# Patient Record
Sex: FEMALE | Race: WHITE | NOT HISPANIC OR LATINO | Employment: FULL TIME | URBAN - METROPOLITAN AREA
[De-identification: names, ages, dates, MRNs, and addresses within clinical notes are randomized per-mention and may not be internally consistent; named-entity substitution may affect disease eponyms.]

---

## 2019-10-21 ENCOUNTER — HOSPITAL ENCOUNTER (INPATIENT)
Facility: HOSPITAL | Age: 59
LOS: 2 days | Discharge: HOME | DRG: 871 | End: 2019-10-24
Attending: EMERGENCY MEDICINE | Admitting: INTERNAL MEDICINE
Payer: COMMERCIAL

## 2019-10-21 ENCOUNTER — APPOINTMENT (EMERGENCY)
Dept: RADIOLOGY | Facility: HOSPITAL | Age: 59
DRG: 871 | End: 2019-10-21
Payer: COMMERCIAL

## 2019-10-21 DIAGNOSIS — J18.9 PNEUMONIA: ICD-10-CM

## 2019-10-21 DIAGNOSIS — J18.9 PNEUMONIA OF RIGHT LOWER LOBE DUE TO INFECTIOUS ORGANISM: ICD-10-CM

## 2019-10-21 DIAGNOSIS — A41.9 SEPSIS (HCC): ICD-10-CM

## 2019-10-21 DIAGNOSIS — R50.9 FEVER: Primary | ICD-10-CM

## 2019-10-21 LAB
ALBUMIN SERPL BCP-MCNC: 3.1 G/DL (ref 3.5–5)
ALP SERPL-CCNC: 90 U/L (ref 46–116)
ALT SERPL W P-5'-P-CCNC: 6 U/L (ref 12–78)
ANION GAP SERPL CALCULATED.3IONS-SCNC: 6 MMOL/L (ref 4–13)
APTT PPP: 25 SECONDS (ref 25–32)
AST SERPL W P-5'-P-CCNC: 14 U/L (ref 5–45)
BASOPHILS # BLD AUTO: 0.02 THOUSANDS/ΜL (ref 0–0.1)
BASOPHILS NFR BLD AUTO: 0 % (ref 0–1)
BILIRUB SERPL-MCNC: 0.4 MG/DL (ref 0.2–1)
BILIRUB UR QL STRIP: NEGATIVE
BUN SERPL-MCNC: 13 MG/DL (ref 5–25)
CALCIUM SERPL-MCNC: 8.5 MG/DL (ref 8.3–10.1)
CHLORIDE SERPL-SCNC: 105 MMOL/L (ref 100–108)
CLARITY UR: CLEAR
CO2 SERPL-SCNC: 30 MMOL/L (ref 21–32)
COLOR UR: YELLOW
CREAT SERPL-MCNC: 0.81 MG/DL (ref 0.6–1.3)
EOSINOPHIL # BLD AUTO: 0.01 THOUSAND/ΜL (ref 0–0.61)
EOSINOPHIL NFR BLD AUTO: 0 % (ref 0–6)
ERYTHROCYTE [DISTWIDTH] IN BLOOD BY AUTOMATED COUNT: 12.5 % (ref 11.6–15.1)
GFR SERPL CREATININE-BSD FRML MDRD: 79 ML/MIN/1.73SQ M
GLUCOSE SERPL-MCNC: 101 MG/DL (ref 65–140)
GLUCOSE UR STRIP-MCNC: NEGATIVE MG/DL
HCT VFR BLD AUTO: 45.4 % (ref 34.8–46.1)
HGB BLD-MCNC: 14.3 G/DL (ref 11.5–15.4)
HGB UR QL STRIP.AUTO: NEGATIVE
IMM GRANULOCYTES # BLD AUTO: 0.03 THOUSAND/UL (ref 0–0.2)
IMM GRANULOCYTES NFR BLD AUTO: 0 % (ref 0–2)
INR PPP: 1.01 (ref 0.91–1.09)
KETONES UR STRIP-MCNC: NEGATIVE MG/DL
LACTATE SERPL-SCNC: 1.5 MMOL/L (ref 0.5–2)
LEUKOCYTE ESTERASE UR QL STRIP: NEGATIVE
LYMPHOCYTES # BLD AUTO: 0.45 THOUSANDS/ΜL (ref 0.6–4.47)
LYMPHOCYTES NFR BLD AUTO: 6 % (ref 14–44)
MCH RBC QN AUTO: 28.5 PG (ref 26.8–34.3)
MCHC RBC AUTO-ENTMCNC: 31.5 G/DL (ref 31.4–37.4)
MCV RBC AUTO: 91 FL (ref 82–98)
MONOCYTES # BLD AUTO: 0.25 THOUSAND/ΜL (ref 0.17–1.22)
MONOCYTES NFR BLD AUTO: 3 % (ref 4–12)
NEUTROPHILS # BLD AUTO: 6.83 THOUSANDS/ΜL (ref 1.85–7.62)
NEUTS SEG NFR BLD AUTO: 91 % (ref 43–75)
NITRITE UR QL STRIP: NEGATIVE
NRBC BLD AUTO-RTO: 0 /100 WBCS
PH UR STRIP.AUTO: 7.5 [PH]
PLATELET # BLD AUTO: 199 THOUSANDS/UL (ref 149–390)
PMV BLD AUTO: 9 FL (ref 8.9–12.7)
POTASSIUM SERPL-SCNC: 3.3 MMOL/L (ref 3.5–5.3)
PROT SERPL-MCNC: 6.3 G/DL (ref 6.4–8.2)
PROT UR STRIP-MCNC: NEGATIVE MG/DL
PROTHROMBIN TIME: 10.8 SECONDS (ref 9.8–12)
RBC # BLD AUTO: 5.01 MILLION/UL (ref 3.81–5.12)
SODIUM SERPL-SCNC: 141 MMOL/L (ref 136–145)
SP GR UR STRIP.AUTO: 1.01 (ref 1–1.03)
UROBILINOGEN UR QL STRIP.AUTO: 0.2 E.U./DL
WBC # BLD AUTO: 7.59 THOUSAND/UL (ref 4.31–10.16)

## 2019-10-21 PROCEDURE — 81003 URINALYSIS AUTO W/O SCOPE: CPT | Performed by: EMERGENCY MEDICINE

## 2019-10-21 PROCEDURE — 80053 COMPREHEN METABOLIC PANEL: CPT | Performed by: EMERGENCY MEDICINE

## 2019-10-21 PROCEDURE — 84145 PROCALCITONIN (PCT): CPT | Performed by: EMERGENCY MEDICINE

## 2019-10-21 PROCEDURE — 87631 RESP VIRUS 3-5 TARGETS: CPT | Performed by: EMERGENCY MEDICINE

## 2019-10-21 PROCEDURE — 96365 THER/PROPH/DIAG IV INF INIT: CPT

## 2019-10-21 PROCEDURE — 85730 THROMBOPLASTIN TIME PARTIAL: CPT | Performed by: EMERGENCY MEDICINE

## 2019-10-21 PROCEDURE — 83605 ASSAY OF LACTIC ACID: CPT | Performed by: EMERGENCY MEDICINE

## 2019-10-21 PROCEDURE — 36415 COLL VENOUS BLD VENIPUNCTURE: CPT | Performed by: EMERGENCY MEDICINE

## 2019-10-21 PROCEDURE — 99285 EMERGENCY DEPT VISIT HI MDM: CPT

## 2019-10-21 PROCEDURE — 74177 CT ABD & PELVIS W/CONTRAST: CPT

## 2019-10-21 PROCEDURE — 96375 TX/PRO/DX INJ NEW DRUG ADDON: CPT

## 2019-10-21 PROCEDURE — 71045 X-RAY EXAM CHEST 1 VIEW: CPT

## 2019-10-21 PROCEDURE — 85025 COMPLETE CBC W/AUTO DIFF WBC: CPT | Performed by: EMERGENCY MEDICINE

## 2019-10-21 PROCEDURE — 85610 PROTHROMBIN TIME: CPT | Performed by: EMERGENCY MEDICINE

## 2019-10-21 PROCEDURE — 71260 CT THORAX DX C+: CPT

## 2019-10-21 PROCEDURE — 87040 BLOOD CULTURE FOR BACTERIA: CPT | Performed by: EMERGENCY MEDICINE

## 2019-10-21 PROCEDURE — 96361 HYDRATE IV INFUSION ADD-ON: CPT

## 2019-10-21 RX ORDER — CYCLOBENZAPRINE HCL 10 MG
10 TABLET ORAL 3 TIMES DAILY PRN
COMMUNITY

## 2019-10-21 RX ORDER — ONDANSETRON 2 MG/ML
4 INJECTION INTRAMUSCULAR; INTRAVENOUS ONCE
Status: COMPLETED | OUTPATIENT
Start: 2019-10-21 | End: 2019-10-21

## 2019-10-21 RX ORDER — OXYCODONE HYDROCHLORIDE AND ACETAMINOPHEN 5; 325 MG/1; MG/1
1 TABLET ORAL EVERY 4 HOURS PRN
COMMUNITY

## 2019-10-21 RX ORDER — DIAZEPAM 2 MG/1
2 TABLET ORAL EVERY 6 HOURS PRN
COMMUNITY

## 2019-10-21 RX ORDER — ACETAMINOPHEN 325 MG/1
650 TABLET ORAL ONCE
Status: COMPLETED | OUTPATIENT
Start: 2019-10-21 | End: 2019-10-21

## 2019-10-21 RX ADMIN — PIPERACILLIN SODIUM,TAZOBACTAM SODIUM 3.38 G: 3; .375 INJECTION, POWDER, FOR SOLUTION INTRAVENOUS at 23:20

## 2019-10-21 RX ADMIN — IOHEXOL 100 ML: 350 INJECTION, SOLUTION INTRAVENOUS at 23:01

## 2019-10-21 RX ADMIN — ACETAMINOPHEN 650 MG: 325 TABLET, FILM COATED ORAL at 22:06

## 2019-10-21 RX ADMIN — SODIUM CHLORIDE 1000 ML: 0.9 INJECTION, SOLUTION INTRAVENOUS at 21:57

## 2019-10-21 RX ADMIN — ONDANSETRON 4 MG: 2 INJECTION INTRAMUSCULAR; INTRAVENOUS at 22:05

## 2019-10-22 ENCOUNTER — APPOINTMENT (OUTPATIENT)
Dept: RADIOLOGY | Facility: HOSPITAL | Age: 59
DRG: 871 | End: 2019-10-22
Payer: COMMERCIAL

## 2019-10-22 PROBLEM — R65.21 SEPTIC SHOCK (HCC): Status: ACTIVE | Noted: 2019-10-22

## 2019-10-22 PROBLEM — Z87.11 HISTORY OF PEPTIC ULCER DISEASE: Status: ACTIVE | Noted: 2019-10-22

## 2019-10-22 PROBLEM — J18.9 PNEUMONIA INVOLVING RIGHT LUNG: Status: ACTIVE | Noted: 2019-10-22

## 2019-10-22 PROBLEM — Z87.11 HISTORY OF PEPTIC ULCER DISEASE: Chronic | Status: ACTIVE | Noted: 2019-10-22

## 2019-10-22 PROBLEM — G89.29 CHRONIC PAIN: Status: ACTIVE | Noted: 2019-10-22

## 2019-10-22 PROBLEM — R10.11 RUQ ABDOMINAL PAIN: Status: ACTIVE | Noted: 2019-10-22

## 2019-10-22 PROBLEM — E87.6 HYPOKALEMIA: Status: ACTIVE | Noted: 2019-10-22

## 2019-10-22 PROBLEM — E87.6 HYPOKALEMIA: Status: RESOLVED | Noted: 2019-10-22 | Resolved: 2019-10-22

## 2019-10-22 PROBLEM — G89.29 CHRONIC PAIN: Chronic | Status: ACTIVE | Noted: 2019-10-22

## 2019-10-22 PROBLEM — A41.9 SEPSIS (HCC): Status: ACTIVE | Noted: 2019-10-22

## 2019-10-22 PROBLEM — R10.11 RUQ ABDOMINAL PAIN: Status: RESOLVED | Noted: 2019-10-22 | Resolved: 2019-10-22

## 2019-10-22 LAB
ALBUMIN SERPL BCP-MCNC: 2.3 G/DL (ref 3.5–5)
ALP SERPL-CCNC: 65 U/L (ref 46–116)
ALT SERPL W P-5'-P-CCNC: 9 U/L (ref 12–78)
ANION GAP SERPL CALCULATED.3IONS-SCNC: 5 MMOL/L (ref 4–13)
AST SERPL W P-5'-P-CCNC: 10 U/L (ref 5–45)
BASOPHILS # BLD AUTO: 0.02 THOUSANDS/ΜL (ref 0–0.1)
BASOPHILS NFR BLD AUTO: 0 % (ref 0–1)
BILIRUB SERPL-MCNC: 0.4 MG/DL (ref 0.2–1)
BUN SERPL-MCNC: 9 MG/DL (ref 5–25)
CALCIUM SERPL-MCNC: 7 MG/DL (ref 8.3–10.1)
CHLORIDE SERPL-SCNC: 111 MMOL/L (ref 100–108)
CO2 SERPL-SCNC: 24 MMOL/L (ref 21–32)
CREAT SERPL-MCNC: 0.8 MG/DL (ref 0.6–1.3)
EOSINOPHIL # BLD AUTO: 0.01 THOUSAND/ΜL (ref 0–0.61)
EOSINOPHIL NFR BLD AUTO: 0 % (ref 0–6)
ERYTHROCYTE [DISTWIDTH] IN BLOOD BY AUTOMATED COUNT: 12.6 % (ref 11.6–15.1)
FLUAV AG SPEC QL: NOT DETECTED
FLUBV AG SPEC QL: NOT DETECTED
GFR SERPL CREATININE-BSD FRML MDRD: 80 ML/MIN/1.73SQ M
GLUCOSE P FAST SERPL-MCNC: 119 MG/DL (ref 65–99)
GLUCOSE SERPL-MCNC: 119 MG/DL (ref 65–140)
HCT VFR BLD AUTO: 38.7 % (ref 34.8–46.1)
HGB BLD-MCNC: 12 G/DL (ref 11.5–15.4)
IMM GRANULOCYTES # BLD AUTO: 0.06 THOUSAND/UL (ref 0–0.2)
IMM GRANULOCYTES NFR BLD AUTO: 1 % (ref 0–2)
L PNEUMO1 AG UR QL IA.RAPID: NEGATIVE
LACTATE SERPL-SCNC: 1.7 MMOL/L (ref 0.5–2)
LYMPHOCYTES # BLD AUTO: 0.73 THOUSANDS/ΜL (ref 0.6–4.47)
LYMPHOCYTES NFR BLD AUTO: 6 % (ref 14–44)
MCH RBC QN AUTO: 29 PG (ref 26.8–34.3)
MCHC RBC AUTO-ENTMCNC: 31 G/DL (ref 31.4–37.4)
MCV RBC AUTO: 94 FL (ref 82–98)
MONOCYTES # BLD AUTO: 0.54 THOUSAND/ΜL (ref 0.17–1.22)
MONOCYTES NFR BLD AUTO: 4 % (ref 4–12)
NEUTROPHILS # BLD AUTO: 11.17 THOUSANDS/ΜL (ref 1.85–7.62)
NEUTS SEG NFR BLD AUTO: 89 % (ref 43–75)
NRBC BLD AUTO-RTO: 0 /100 WBCS
PLATELET # BLD AUTO: 181 THOUSANDS/UL (ref 149–390)
PMV BLD AUTO: 9.1 FL (ref 8.9–12.7)
POTASSIUM SERPL-SCNC: 4.1 MMOL/L (ref 3.5–5.3)
PROCALCITONIN SERPL-MCNC: 1.39 NG/ML
PROCALCITONIN SERPL-MCNC: 9.76 NG/ML
PROT SERPL-MCNC: 4.9 G/DL (ref 6.4–8.2)
RBC # BLD AUTO: 4.14 MILLION/UL (ref 3.81–5.12)
RSV B RNA SPEC QL NAA+PROBE: NOT DETECTED
S PNEUM AG UR QL: NEGATIVE
SODIUM SERPL-SCNC: 140 MMOL/L (ref 136–145)
WBC # BLD AUTO: 12.53 THOUSAND/UL (ref 4.31–10.16)

## 2019-10-22 PROCEDURE — 99255 IP/OBS CONSLTJ NEW/EST HI 80: CPT | Performed by: ANESTHESIOLOGY

## 2019-10-22 PROCEDURE — 87449 NOS EACH ORGANISM AG IA: CPT | Performed by: STUDENT IN AN ORGANIZED HEALTH CARE EDUCATION/TRAINING PROGRAM

## 2019-10-22 PROCEDURE — 87081 CULTURE SCREEN ONLY: CPT | Performed by: STUDENT IN AN ORGANIZED HEALTH CARE EDUCATION/TRAINING PROGRAM

## 2019-10-22 PROCEDURE — 76705 ECHO EXAM OF ABDOMEN: CPT

## 2019-10-22 PROCEDURE — 94664 DEMO&/EVAL PT USE INHALER: CPT

## 2019-10-22 PROCEDURE — 84145 PROCALCITONIN (PCT): CPT | Performed by: INTERNAL MEDICINE

## 2019-10-22 PROCEDURE — 99219 PR INITIAL OBSERVATION CARE/DAY 50 MINUTES: CPT | Performed by: STUDENT IN AN ORGANIZED HEALTH CARE EDUCATION/TRAINING PROGRAM

## 2019-10-22 PROCEDURE — 85025 COMPLETE CBC W/AUTO DIFF WBC: CPT | Performed by: STUDENT IN AN ORGANIZED HEALTH CARE EDUCATION/TRAINING PROGRAM

## 2019-10-22 PROCEDURE — 83605 ASSAY OF LACTIC ACID: CPT | Performed by: INTERNAL MEDICINE

## 2019-10-22 PROCEDURE — 99357 PR PROLONGED SERV,INPATIENT,EA ADD 30 MIN: CPT | Performed by: INTERNAL MEDICINE

## 2019-10-22 PROCEDURE — 80053 COMPREHEN METABOLIC PANEL: CPT | Performed by: STUDENT IN AN ORGANIZED HEALTH CARE EDUCATION/TRAINING PROGRAM

## 2019-10-22 RX ORDER — SODIUM CHLORIDE, SODIUM GLUCONATE, SODIUM ACETATE, POTASSIUM CHLORIDE, MAGNESIUM CHLORIDE, SODIUM PHOSPHATE, DIBASIC, AND POTASSIUM PHOSPHATE .53; .5; .37; .037; .03; .012; .00082 G/100ML; G/100ML; G/100ML; G/100ML; G/100ML; G/100ML; G/100ML
1000 INJECTION, SOLUTION INTRAVENOUS ONCE
Status: COMPLETED | OUTPATIENT
Start: 2019-10-22 | End: 2019-10-22

## 2019-10-22 RX ORDER — DIAZEPAM 2 MG/1
2 TABLET ORAL EVERY 6 HOURS PRN
Status: DISCONTINUED | OUTPATIENT
Start: 2019-10-22 | End: 2019-10-24 | Stop reason: HOSPADM

## 2019-10-22 RX ORDER — SODIUM CHLORIDE, SODIUM GLUCONATE, SODIUM ACETATE, POTASSIUM CHLORIDE, MAGNESIUM CHLORIDE, SODIUM PHOSPHATE, DIBASIC, AND POTASSIUM PHOSPHATE .53; .5; .37; .037; .03; .012; .00082 G/100ML; G/100ML; G/100ML; G/100ML; G/100ML; G/100ML; G/100ML
1000 INJECTION, SOLUTION INTRAVENOUS ONCE
Status: COMPLETED | OUTPATIENT
Start: 2019-10-23 | End: 2019-10-22

## 2019-10-22 RX ORDER — OXYCODONE HYDROCHLORIDE 5 MG/1
5 TABLET ORAL EVERY 4 HOURS PRN
Status: DISCONTINUED | OUTPATIENT
Start: 2019-10-22 | End: 2019-10-24 | Stop reason: HOSPADM

## 2019-10-22 RX ORDER — VANCOMYCIN HYDROCHLORIDE 1 G/200ML
15 INJECTION, SOLUTION INTRAVENOUS EVERY 12 HOURS
Status: DISCONTINUED | OUTPATIENT
Start: 2019-10-22 | End: 2019-10-23

## 2019-10-22 RX ORDER — AZITHROMYCIN 250 MG/1
500 TABLET, FILM COATED ORAL EVERY 24 HOURS
Status: DISCONTINUED | OUTPATIENT
Start: 2019-10-22 | End: 2019-10-22

## 2019-10-22 RX ORDER — CLINDAMYCIN PHOSPHATE 600 MG/50ML
600 INJECTION INTRAVENOUS EVERY 8 HOURS
Status: DISCONTINUED | OUTPATIENT
Start: 2019-10-22 | End: 2019-10-22

## 2019-10-22 RX ORDER — ACETAMINOPHEN 325 MG/1
650 TABLET ORAL EVERY 6 HOURS PRN
Status: DISCONTINUED | OUTPATIENT
Start: 2019-10-22 | End: 2019-10-22

## 2019-10-22 RX ORDER — HEPARIN SODIUM 5000 [USP'U]/ML
5000 INJECTION, SOLUTION INTRAVENOUS; SUBCUTANEOUS EVERY 8 HOURS SCHEDULED
Status: DISCONTINUED | OUTPATIENT
Start: 2019-10-22 | End: 2019-10-24 | Stop reason: HOSPADM

## 2019-10-22 RX ORDER — ACETAMINOPHEN 325 MG/1
650 TABLET ORAL EVERY 6 HOURS PRN
Status: DISCONTINUED | OUTPATIENT
Start: 2019-10-22 | End: 2019-10-24 | Stop reason: HOSPADM

## 2019-10-22 RX ORDER — OXYCODONE HYDROCHLORIDE AND ACETAMINOPHEN 5; 325 MG/1; MG/1
1 TABLET ORAL EVERY 4 HOURS PRN
Status: DISCONTINUED | OUTPATIENT
Start: 2019-10-22 | End: 2019-10-22

## 2019-10-22 RX ORDER — POTASSIUM CHLORIDE 14.9 MG/ML
20 INJECTION INTRAVENOUS
Status: COMPLETED | OUTPATIENT
Start: 2019-10-22 | End: 2019-10-22

## 2019-10-22 RX ORDER — POTASSIUM CHLORIDE 29.8 MG/ML
40 INJECTION INTRAVENOUS ONCE
Status: DISCONTINUED | OUTPATIENT
Start: 2019-10-22 | End: 2019-10-22 | Stop reason: SDUPTHER

## 2019-10-22 RX ORDER — SODIUM CHLORIDE, SODIUM GLUCONATE, SODIUM ACETATE, POTASSIUM CHLORIDE, MAGNESIUM CHLORIDE, SODIUM PHOSPHATE, DIBASIC, AND POTASSIUM PHOSPHATE .53; .5; .37; .037; .03; .012; .00082 G/100ML; G/100ML; G/100ML; G/100ML; G/100ML; G/100ML; G/100ML
1000 INJECTION, SOLUTION INTRAVENOUS ONCE
Status: DISCONTINUED | OUTPATIENT
Start: 2019-10-23 | End: 2019-10-23

## 2019-10-22 RX ORDER — CEFTRIAXONE 1 G/50ML
1000 INJECTION, SOLUTION INTRAVENOUS EVERY 24 HOURS
Status: DISCONTINUED | OUTPATIENT
Start: 2019-10-22 | End: 2019-10-22

## 2019-10-22 RX ORDER — CYCLOBENZAPRINE HCL 10 MG
10 TABLET ORAL 3 TIMES DAILY PRN
Status: DISCONTINUED | OUTPATIENT
Start: 2019-10-22 | End: 2019-10-24 | Stop reason: HOSPADM

## 2019-10-22 RX ADMIN — CLINDAMYCIN PHOSPHATE 600 MG: 600 INJECTION, SOLUTION INTRAVENOUS at 07:56

## 2019-10-22 RX ADMIN — METRONIDAZOLE 500 MG: 500 INJECTION, SOLUTION INTRAVENOUS at 22:01

## 2019-10-22 RX ADMIN — CEFEPIME HYDROCHLORIDE 2000 MG: 2 INJECTION, POWDER, FOR SOLUTION INTRAVENOUS at 14:29

## 2019-10-22 RX ADMIN — VANCOMYCIN HYDROCHLORIDE 1000 MG: 1 INJECTION, SOLUTION INTRAVENOUS at 17:24

## 2019-10-22 RX ADMIN — POTASSIUM CHLORIDE 20 MEQ: 14.9 INJECTION, SOLUTION INTRAVENOUS at 00:45

## 2019-10-22 RX ADMIN — OXYCODONE HYDROCHLORIDE AND ACETAMINOPHEN 1 TABLET: 5; 325 TABLET ORAL at 02:53

## 2019-10-22 RX ADMIN — SODIUM CHLORIDE 1000 ML: 0.9 INJECTION, SOLUTION INTRAVENOUS at 00:09

## 2019-10-22 RX ADMIN — HEPARIN SODIUM 5000 UNITS: 5000 INJECTION INTRAVENOUS; SUBCUTANEOUS at 05:38

## 2019-10-22 RX ADMIN — HEPARIN SODIUM 5000 UNITS: 5000 INJECTION INTRAVENOUS; SUBCUTANEOUS at 14:18

## 2019-10-22 RX ADMIN — METRONIDAZOLE 500 MG: 500 INJECTION, SOLUTION INTRAVENOUS at 15:12

## 2019-10-22 RX ADMIN — AZITHROMYCIN 500 MG: 250 TABLET, FILM COATED ORAL at 02:53

## 2019-10-22 RX ADMIN — OXYCODONE HYDROCHLORIDE AND ACETAMINOPHEN 1 TABLET: 5; 325 TABLET ORAL at 08:26

## 2019-10-22 RX ADMIN — CEFTRIAXONE 1000 MG: 1 INJECTION, SOLUTION INTRAVENOUS at 08:39

## 2019-10-22 RX ADMIN — OXYCODONE HYDROCHLORIDE 5 MG: 5 TABLET ORAL at 22:30

## 2019-10-22 RX ADMIN — SODIUM CHLORIDE 1000 ML: 0.9 INJECTION, SOLUTION INTRAVENOUS at 02:11

## 2019-10-22 RX ADMIN — SODIUM CHLORIDE 1000 ML: 0.9 INJECTION, SOLUTION INTRAVENOUS at 08:00

## 2019-10-22 RX ADMIN — HEPARIN SODIUM 5000 UNITS: 5000 INJECTION INTRAVENOUS; SUBCUTANEOUS at 22:00

## 2019-10-22 RX ADMIN — SODIUM CHLORIDE, SODIUM GLUCONATE, SODIUM ACETATE, POTASSIUM CHLORIDE AND MAGNESIUM CHLORIDE 1000 ML: 526; 502; 368; 37; 30 INJECTION, SOLUTION INTRAVENOUS at 10:18

## 2019-10-22 RX ADMIN — POTASSIUM CHLORIDE 20 MEQ: 14.9 INJECTION, SOLUTION INTRAVENOUS at 02:54

## 2019-10-22 RX ADMIN — SODIUM CHLORIDE, SODIUM GLUCONATE, SODIUM ACETATE, POTASSIUM CHLORIDE AND MAGNESIUM CHLORIDE 1000 ML: 526; 502; 368; 37; 30 INJECTION, SOLUTION INTRAVENOUS at 11:40

## 2019-10-22 RX ADMIN — OXYCODONE HYDROCHLORIDE 5 MG: 5 TABLET ORAL at 14:29

## 2019-10-22 NOTE — CONSULTS
Consult- Linda Santana 1960, 62 y o  female MRN: [id number]    Unit/Bed#: ICU 04 Encounter: [id number]    Primary Care Provider: Carl Moncada MD   Date and time admitted to hospital: 10/21/2019  9:29 PM      Consults    * Pneumonia involving right lung  Assessment & Plan  · Was admitted to [facility] service and placed on Rocephin, Azithro and Clinda  · Because of worsening BP's will increase to Cefepime, Vanco and Flagyl  · Encourage I/S  · Pulm toilet    Septic shock ([facility] [address] )  Assessment & Plan  · Received multiple IVF boluses to include 30 cc/kg of crystalloid but did not seem to respond, BP's remain in the 80's  · Brought down to [place] for closer observation  · On arrival to ICU by 500 systolic, will hold on pressors for now  · Start Levophed if BP <90  · Lactate WNL    Chronic pain  Assessment & Plan  · Cont prn Oxycodone 5 mg Q4H prn      -------------------------------------------------------------------------------------------------------------  Chief Complaint: currently w/ no complaints; being transferred for hypotension    History of Present Illness   Linda Santana is a 62 y o  female w/ pmhx of chronic pain who presents with nausea, vomiting and myalgia on 10/21  She reports having undergone a ISIDRO early in the day but later in the day became nauseated and started vomiting several times  She also began to have chills and myalgia  She was febrile on arrival to ED and CT chest showed concern for PNA  Admitted to floor but overnight became hypotensive but did not respond to IVF 30 cc/kg  She was transferred to [facility] for pressor initiation but upn arrival BP's were above 90 sys      History obtained from chart review and the patient   -------------------------------------------------------------------------------------------------------------  Dispo: Transfer to Stepdown Level 1     Code Status: Level 1 - Full Code  --------------------------------------------------------------------------------------------------------------  Review of Systems   Constitutional: Negative  HENT: Negative  Eyes: Negative  Respiratory: Negative  Cardiovascular: Negative  Gastrointestinal: Negative  Genitourinary: Negative  Musculoskeletal: Negative  Skin: Negative  Neurological: Negative  Psychiatric/Behavioral: Negative  All other systems reviewed and are negative  A 12-point, complete review of systems was reviewed and negative except as stated above     Physical Exam   Constitutional: She is oriented to person, place, and time  She appears well-developed and well-nourished  No distress  HENT:   Head: Normocephalic and atraumatic  Eyes: Pupils are equal, round, and reactive to light  Neck: Normal range of motion  Cardiovascular: Normal rate and regular rhythm  Pulmonary/Chest: Effort normal and breath sounds normal  No respiratory distress  Abdominal: Soft  Bowel sounds are normal  She exhibits no distension  There is no tenderness  Musculoskeletal: Normal range of motion  She exhibits no edema  Neurological: She is alert and oriented to person, place, and time  Skin: Skin is warm and dry  Capillary refill takes less than 2 seconds  She is not diaphoretic  Psychiatric: She has a normal mood and affect  Her behavior is normal    Nursing note and vitals reviewed      --------------------------------------------------------------------------------------------------------------  Historical Information   Past Medical History:   Diagnosis Date    Arthritis     Disease of thyroid gland     History of transfusion      Past Surgical History:   Procedure Laterality Date    APPENDECTOMY      HERNIA REPAIR      OOPHORECTOMY Right      Social History   Social History     Substance and Sexual Activity   Alcohol Use Never    Alcohol/week: 0 0 standard drinks    Frequency: Never     Social History     Substance and Sexual Activity   Drug Use Never     Social History     Tobacco Use   Smoking Status Former Smoker    Last attempt to quit: Graciela Hernandez Years since quittin 8   Smokeless Tobacco Never Used     Exercise History: Ambulatory  Family History: I have reviewed this patient's family history and commented on sigificant items within the HPI and History reviewed  No pertinent family history  Vitals:   Vitals:    10/22/19 1400 10/22/19 1415 10/22/19 1430 10/22/19 1500   BP: 94/54 93/55 92/57 90/57   BP Location:       Pulse: 92 91 87 85   Resp: 15 16 16 17   Temp:       TempSrc:       SpO2: 96% 94% 95% 95%   Weight:       Height:         Temp  Min: 97 8 °F (36 6 °C)  Max: 103 4 °F (39 7 °C)  IBW: 54 7 kg  Height: 5' 4" (162 6 cm)  Body mass index is 31 14 kg/m²  Medications:  Current Facility-Administered Medications   Medication Dose Route Frequency    acetaminophen (TYLENOL) tablet 650 mg  650 mg Oral Q6H PRN    cefepime (MAXIPIME) 2,000 mg in dextrose 5 % 50 mL IVPB  2,000 mg Intravenous Q12H    cyclobenzaprine (FLEXERIL) tablet 10 mg  10 mg Oral TID PRN    diazepam (VALIUM) tablet 2 mg  2 mg Oral Q6H PRN    heparin (porcine) subcutaneous injection 5,000 Units  5,000 Units Subcutaneous Q8H [address]    metroNIDAZOLE (FLAGYL) IVPB (premix) 500 mg  500 mg Intravenous Q8H    [START ON 10/23/2019] multi-electrolyte (PLASMALYTE-A/ISOLYTE-S PH 7 4) IV solution 1,000 mL  1,000 mL Intravenous Once    oxyCODONE (ROXICODONE) IR tablet 5 mg  5 mg Oral Q4H PRN    vancomycin (VANCOCIN) IVPB (premix) 1,000 mg  15 mg/kg (Adjusted) Intravenous Q12H     Home medications:  Prior to Admission Medications   Prescriptions Last Dose Informant Patient Reported? Taking?    cyclobenzaprine (FLEXERIL) 10 mg tablet 10/21/2019 at Unknown time  Yes Yes   Sig: Take 10 mg by mouth 3 (three) times a day as needed for muscle spasms   diazepam (VALIUM) 2 mg tablet Past Month at Unknown time  Yes Yes   Sig: Take 2 mg by mouth every 6 (six) hours as needed for anxiety   oxyCODONE-acetaminophen (PERCOCET) 5-325 mg per tablet Past Week at Unknown time  Yes Yes   Sig: Take 1 tablet by mouth every 4 (four) hours as needed for moderate pain      Facility-Administered Medications: None     Allergies:  No Known Allergies      Laboratory and Diagnostics:  Results from last 7 days   Lab Units 10/22/19  0546 10/21/19  2158   WBC Thousand/uL 12 53* 7 59   HEMOGLOBIN g/dL 12 0 14 3   HEMATOCRIT % 38 7 45 4   PLATELETS Thousands/uL 181 199   NEUTROS PCT % 89* 91*   MONOS PCT % 4 3*     Results from last 7 days   Lab Units 10/22/19  0546 10/21/19  2158   SODIUM mmol/L 140 141   POTASSIUM mmol/L 4 1 3 3*   CHLORIDE mmol/L 111* 105   CO2 mmol/L 24 30   ANION GAP mmol/L 5 6   BUN mg/dL 9 13   CREATININE mg/dL 0 80 0 81   CALCIUM mg/dL 7 0* 8 5   GLUCOSE RANDOM mg/dL 119 101   ALT U/L 9* 6*   AST U/L 10 14   ALK PHOS U/L 65 90   ALBUMIN g/dL 2 3* 3 1*   TOTAL BILIRUBIN mg/dL 0 40 0 40          Results from last 7 days   Lab Units 10/21/19  2158   INR  1 01   PTT seconds 25          Results from last 7 days   Lab Units 10/22/19  0932 10/21/19  2158   LACTIC ACID mmol/L 1 7 1 5     ABG:    VBG:    Results from last 7 days   Lab Units 10/22/19  0932 10/21/19  2158   PROCALCITONIN ng/ml 9 76* 1 39*         Micro:  Results from last 7 days   Lab Units 10/22/19  0302   LEGIONELLA URINARY ANTIGEN  Negative   STREP PNEUMONIAE ANTIGEN, URINE  Negative         EKG: NSR  Imaging: I have personally reviewed pertinent reports        ------------------------------------------------------------------------------------------------------------  Advance Directive and Living Will:      Power of :    POLST:    ------------------------------------------------------------------------------------------------------------    RADHA Thornton

## 2019-10-22 NOTE — PROGRESS NOTES
Vancomycin Assessment    Michoacano Walker is a 62 y o  female who is currently receiving vancomycin Vancomycin 1000 mg IV q12h for Pneumonia     Relevant clinical data and objective history reviewed:  Creatinine   Date Value Ref Range Status   10/22/2019 0 80 0 60 - 1 30 mg/dL Final     Comment:     Standardized to IDMS reference method   10/21/2019 0 81 0 60 - 1 30 mg/dL Final     Comment:     Standardized to IDMS reference method     BP 94/54   Pulse 92   Temp 98 5 °F (36 9 °C) (Tympanic)   Resp 15   Ht 5' 4" (1 626 m)   Wt 82 3 kg (181 lb 7 oz)   SpO2 96%   BMI 31 14 kg/m²   I/O last 3 completed shifts: In: 1000 [IV Piggyback:1000]  Out: 650 [Urine:650]  Lab Results   Component Value Date/Time    BUN 9 10/22/2019 05:46 AM    WBC 12 53 (H) 10/22/2019 05:46 AM    HGB 12 0 10/22/2019 05:46 AM    HCT 38 7 10/22/2019 05:46 AM    MCV 94 10/22/2019 05:46 AM     10/22/2019 05:46 AM     Temp Readings from Last 3 Encounters:   10/22/19 98 5 °F (36 9 °C) (Tympanic)     Vancomycin Days of Therapy: 1    Assessment/Plan  The patient is currently on vancomycin utilizing scheduled dosing based on adjusted body weight (due to obesity)  Baseline risks associated with therapy include: concomitant nephrotoxic medications and advanced age  The patient is currently receiving Vancomycin 1000 mg IV q12h and is clinically appropriate and dose will be continued  Pharmacy will also follow closely for s/sx of nephrotoxicity, infusion reactions and appropriateness of therapy  BMP and CBC will be ordered per protocol  Plan for trough as patient approaches steady state, prior to the 5th  dose at approximately 1430 on 10/24/19  Due to infection severity, will target a trough of 15-20 (appropriate for most indications)   Pharmacy will continue to follow the patients culture results and clinical progress daily      Susan Cota, Pharmacist

## 2019-10-22 NOTE — ASSESSMENT & PLAN NOTE
· Received multiple IVF boluses to include 30 cc/kg of crystalloid but did not seem to respond, BP's remain in the 80's  · Brought down to [place] for closer observation  · On arrival to ICU by 417 systolic, will hold on pressors for now  · Start Levophed if BP <90  · Lactate WNL

## 2019-10-22 NOTE — ED NOTES
Patient transported to [address] [place] [address], [address] [place] [address]  10/21/19 5133

## 2019-10-22 NOTE — ASSESSMENT & PLAN NOTE
· Was admitted to [facility] service and placed on Rocephin, Azithro and Clinda  · Because of worsening BP's will increase to Cefepime, Vanco and Flagyl  · Encourage I/S  · Pulm toilet

## 2019-10-22 NOTE — PROGRESS NOTES
Progress Note - Rolanda Marin 1960, 62 y o  female MRN: [id number]    Unit/Bed#: ICU 04 Encounter: [id number]    Primary Care Provider: Madelyn Gan MD   Date and time admitted to hospital: 10/21/2019  9:29 PM        * Pneumonia involving right lung  Assessment & Plan  · Was admitted to [facility] service and placed on Rocephin, Azithro and Clinda  · Because of worsening BP's will increase to Cefepime, Vanco and Flagyl  · Encourage I/S  · Pulm toilet    Septic shock ([facility] [address] )  Assessment & Plan  · Received multiple IVF boluses to include 30 cc/kg of crystalloid but did not seem to respond, BP's remain in the 80's  · Brought down to [place] for closer observation  · On arrival to ICU by 385 systolic, will hold on pressors for now  · Start Levophed if BP <90  · Lactate WNL    RUQ abdominal painresolved as of 10/22/2019  Assessment & Plan  · No c/o right upper quadrant tenderness at present  · U/S WNL    Chronic pain  Assessment & Plan  · Cont prn Oxycodone 5 mg Q4H prn      -------------------------------------------------------------------------------------------------------------  Chief Complaint: currently w/ no complaints; being transferred for hypotension    History of Present Illness   Rolanda Marin is a 62 y o  female w/ pmhx of chronic pain who presents with nausea, vomiting and myalgia on 10/21  She reports having undergone a ISIDRO early in the day but later in the day became nauseated and started vomiting several times  She also began to have chills and myalgia  She was febrile on arrival to ED and CT chest showed concern for PNA  Admitted to floor but overnight became hypotensive but did not respond to IVF 30 cc/kg  She was transferred to [address] [place] [address] for pressor initiation but upn arrival BP's were above 90 sys      History obtained from chart review and the patient   -------------------------------------------------------------------------------------------------------------  Dispo: Transfer to Stepdown Level 1 Code Status: Level 1 - Full Code  --------------------------------------------------------------------------------------------------------------  Review of Systems   Constitutional: Negative  HENT: Negative  Eyes: Negative  Respiratory: Negative  Cardiovascular: Negative  Gastrointestinal: Negative  Endocrine: Negative  Genitourinary: Negative  Musculoskeletal: Negative  Skin: Negative  Neurological: Negative  Hematological: Negative  Psychiatric/Behavioral: Negative  All other systems reviewed and are negative  A 12-point, complete review of systems was reviewed and negative except as stated above     Physical Exam   Constitutional: She is oriented to person, place, and time  She appears well-developed and well-nourished  No distress  HENT:   Head: Normocephalic and atraumatic  Eyes: Pupils are equal, round, and reactive to light  EOM are normal    Neck: Normal range of motion  Cardiovascular: Normal rate and regular rhythm  Pulmonary/Chest: Effort normal and breath sounds normal    Abdominal: Soft  Bowel sounds are normal  She exhibits no distension  Musculoskeletal: Normal range of motion  She exhibits no edema  Neurological: She is alert and oriented to person, place, and time  Skin: Skin is warm  She is not diaphoretic  Psychiatric: She has a normal mood and affect  Nursing note and vitals reviewed      --------------------------------------------------------------------------------------------------------------  Historical Information   Past Medical History:   Diagnosis Date    Arthritis     Disease of thyroid gland     History of transfusion      Past Surgical History:   Procedure Laterality Date    APPENDECTOMY      HERNIA REPAIR      OOPHORECTOMY Right      Social History   Social History     Substance and Sexual Activity   Alcohol Use Never    Alcohol/week: 0 0 standard drinks    Frequency: Never     Social History     Substance and Sexual Activity   Drug Use Never     Social History     Tobacco Use   Smoking Status Former Smoker    Last attempt to quit: Renan Carvalho Years since quittin 8   Smokeless Tobacco Never Used     Exercise History: Ambulatory  Family History: I have reviewed this patient's family history and commented on sigificant items within the HPI and History reviewed  No pertinent family history  Vitals:   Vitals:    10/22/19 1158 10/22/19 1250 10/22/19 1345 10/22/19 1400   BP: (!) 84/51 (!) 85/55 107/61 94/54   BP Location: Right arm Right arm     Pulse: 80 80 101 92   Resp:   (!) 29 15   Temp:       TempSrc:       SpO2:   97% 96%   Weight:       Height:         Temp  Min: 97 8 °F (36 6 °C)  Max: 103 4 °F (39 7 °C)  IBW: 54 7 kg  Height: 5' 4" (162 6 cm)  Body mass index is 31 14 kg/m²  Medications:  Current Facility-Administered Medications   Medication Dose Route Frequency    acetaminophen (TYLENOL) tablet 650 mg  650 mg Oral Q6H PRN    cefepime (MAXIPIME) 2,000 mg in dextrose 5 % 50 mL IVPB  2,000 mg Intravenous Q12H    cyclobenzaprine (FLEXERIL) tablet 10 mg  10 mg Oral TID PRN    diazepam (VALIUM) tablet 2 mg  2 mg Oral Q6H PRN    heparin (porcine) subcutaneous injection 5,000 Units  5,000 Units Subcutaneous Q8H [address]    metroNIDAZOLE (FLAGYL) IVPB (premix) 500 mg  500 mg Intravenous Q8H    [START ON 10/23/2019] multi-electrolyte (PLASMALYTE-A/ISOLYTE-S PH 7 4) IV solution 1,000 mL  1,000 mL Intravenous Once    oxyCODONE (ROXICODONE) IR tablet 5 mg  5 mg Oral Q4H PRN    vancomycin (VANCOCIN) IVPB (premix) 1,000 mg  15 mg/kg (Adjusted) Intravenous Q12H     Home medications:  Prior to Admission Medications   Prescriptions Last Dose Informant Patient Reported? Taking?    cyclobenzaprine (FLEXERIL) 10 mg tablet 10/21/2019 at Unknown time  Yes Yes   Sig: Take 10 mg by mouth 3 (three) times a day as needed for muscle spasms   diazepam (VALIUM) 2 mg tablet Past Month at Unknown time  Yes Yes   Sig: Take 2 mg by mouth every 6 (six) hours as needed for anxiety   oxyCODONE-acetaminophen (PERCOCET) 5-325 mg per tablet Past Week at Unknown time  Yes Yes   Sig: Take 1 tablet by mouth every 4 (four) hours as needed for moderate pain      Facility-Administered Medications: None     Allergies:  No Known Allergies      Laboratory and Diagnostics:  Results from last 7 days   Lab Units 10/22/19  0546 10/21/19  2158   WBC Thousand/uL 12 53* 7 59   HEMOGLOBIN g/dL 12 0 14 3   HEMATOCRIT % 38 7 45 4   PLATELETS Thousands/uL 181 199   NEUTROS PCT % 89* 91*   MONOS PCT % 4 3*     Results from last 7 days   Lab Units 10/22/19  0546 10/21/19  2158   SODIUM mmol/L 140 141   POTASSIUM mmol/L 4 1 3 3*   CHLORIDE mmol/L 111* 105   CO2 mmol/L 24 30   ANION GAP mmol/L 5 6   BUN mg/dL 9 13   CREATININE mg/dL 0 80 0 81   CALCIUM mg/dL 7 0* 8 5   GLUCOSE RANDOM mg/dL 119 101   ALT U/L 9* 6*   AST U/L 10 14   ALK PHOS U/L 65 90   ALBUMIN g/dL 2 3* 3 1*   TOTAL BILIRUBIN mg/dL 0 40 0 40          Results from last 7 days   Lab Units 10/21/19  2158   INR  1 01   PTT seconds 25          Results from last 7 days   Lab Units 10/22/19  0932 10/21/19  2158   LACTIC ACID mmol/L 1 7 1 5     ABG:    VBG:    Results from last 7 days   Lab Units 10/22/19  0932 10/21/19  2158   PROCALCITONIN ng/ml 9 76* 1 39*         Micro:  Results from last 7 days   Lab Units 10/22/19  0302   LEGIONELLA URINARY ANTIGEN  Negative   STREP PNEUMONIAE ANTIGEN, URINE  Negative         EKG: NSR  Imaging: I have personally reviewed pertinent reports        ------------------------------------------------------------------------------------------------------------  Advance Directive and Living Will:      Power of :    POLST:    ------------------------------------------------------------------------------------------------------------    RADHA Tucker

## 2019-10-22 NOTE — UTILIZATION REVIEW
Initial Clinical Review    Admission: Date/Time/Statement:  OBSERVATION 10/22/19 @ 0036, CONVERTED TO INPATIENT ADMISSION 10/22/19 @ 20376 Naida Canales     10/22/19  PT BEING TX FOR PNEUMONIA S/P ISIDRO AT [facility], TRANSFERRED TO ICU STEPDOWN, FOR SEPTIC SHOCK 2ND PNEUMONIA, IVABT COVERAGE BROADENED  CULTURES PENDING  TMAX 103 4  Admitting Physician CAMMIE, [facility]    Level of Care Med Surg    Estimated length of stay More than 2 Midnights    Certification I certify that inpatient services are medically necessary for this patient for a duration of greater than two midnights  See H&P and MD Progress Notes for additional information about the patient's course of treatment            Order History   Inpatient   Date/Time Action Taken User Additional Information   10/22/19 1006 Release Wilberto Villalobos MD (auto-released) From Order: [id number]   10/22/19 1006 Complete Wilberto Villalobos MD      ED Arrival Information     Expected Arrival Acuity Means of Arrival Escorted By Service Admission Type    - 10/21/2019 21:20 Urgent Walk-In Family Member General Medicine Urgent    Arrival Complaint    Shaky post procedure        Chief Complaint   Patient presents with    Post-op Problem     Patient statse that she had a ISIDRO done today by Dr Mary Maradiaga at [facility]  States that sh doesn't feel well after it  Assessment/Plan:   61 yo female had ISIDRO done at [facility] at 2:45 this afternoon  She had that done because they thought they something on the regular echo  She was told by the doctor who did it that it was normal   She went home and about 7 pm started to feel ill  She developed abdominal pain, nausea, vomiting and severe body aches  Sepsis ([address] )  Assessment & Plan  POA - as evidenced by fever, tachycardia and right lung pneumonia  CT chest notes the following: Diffuse airspace opacities within the lungs and consolidation in the right lower lobe   Findings likely represent pneumonia  She was given a dose of Zosyn in the ED  Blood cultures collected  Influenza panel in process  Will admit to Medicine, start on Ceftriaxone, Azithromycin and add on Clindamycin due to concern for possible aspiration and check urine strep/legionella   RUQ abdominal pain  Assessment & Plan  Pt reports this has been ongoing for several days  LFTs are unremarkable  Also complains of nausea/vomiting    Will repeat LFTs later today and check RUQ ultrasound to assess her gallbladder     ED Triage Vitals   Temperature Pulse Respirations Blood Pressure SpO2   10/21/19 2130 10/21/19 2130 10/21/19 2130 10/21/19 2135 10/21/19 2130   (!) 103 4 °F (39 7 °C) (!) 135 18 129/72 93 %      Temp Source Heart Rate Source Patient Position - Orthostatic VS BP Location FiO2 (%)   10/21/19 2130 10/22/19 0159 10/21/19 2135 10/21/19 2135 --   Tympanic Monitor Lying Left arm       Pain Score       10/21/19 2126       8        Wt Readings from Last 1 Encounters:   10/22/19 76 kg (167 lb 8 8 oz)     Additional Vital Signs:   10/22/19 1345  --  101  29Abnormal   107/61  80  97 %  --  --   10/22/19 1250  --  80  --  85/55Abnormal   --  --  --  Lying   10/22/19 1158  --  80  --  84/51Abnormal   --  --  --  Lying   10/22/19 1142  --  80  --  84/51Abnormal   --  --  --  Lying   10/22/19 1130  --  81  --  84/53Abnormal   --  --  --  Lying   10/22/19 1111  --  85  --  91/60  --  --  --  Lying   10/22/19 1057  --  83  --  84/54Abnormal   --  --  --  Lying   10/22/19 1042  --  80  --  93/55  --  --  --  Lying   10/22/19 1025  --  83  --  90/53  --  --  --  --   10/22/19 1019  --  85  --  86/52Abnormal   --  --  --  Lying   10/22/19 0957  --  88  --  95/50  --  --  --  Lying   10/22/19 0923  98 5 °F (36 9 °C)  84  --  89/52Abnormal   --  --  --  Lying   10/22/19 0706  98 °F (36 7 °C)  78  19  72/49Abnormal   --  96 %  None (Room air)  Lying     Pertinent Labs/Diagnostic Test Results:   Results from last 7 days Lab Units 10/22/19  0546 10/21/19  2158   WBC Thousand/uL 12 53* 7 59   HEMOGLOBIN g/dL 12 0 14 3   HEMATOCRIT % 38 7 45 4   PLATELETS Thousands/uL 181 199   NEUTROS ABS Thousands/µL 11 17* 6 83     Results from last 7 days   Lab Units 10/21/19  2158   SODIUM mmol/L 141   POTASSIUM mmol/L 3 3*   CHLORIDE mmol/L 105   CO2 mmol/L 30   ANION GAP mmol/L 6   BUN mg/dL 13   CREATININE mg/dL 0 81   EGFR ml/min/1 73sq m 79   CALCIUM mg/dL 8 5     Results from last 7 days   Lab Units 10/21/19  2158   AST U/L 14   ALT U/L 6*   ALK PHOS U/L 90   TOTAL PROTEIN g/dL 6 3*   ALBUMIN g/dL 3 1*   TOTAL BILIRUBIN mg/dL 0 40     Results from last 7 days   Lab Units 10/21/19  2158   GLUCOSE RANDOM mg/dL 101     Results from last 7 days   Lab Units 10/21/19  2158   PROTIME seconds 10 8   INR  1 01   PTT seconds 25     Results from last 7 days   Lab Units 10/21/19  2158   LACTIC ACID mmol/L 1 5     Results from last 7 days   Lab Units 10/21/19  2317   CLARITY UA  Clear   COLOR UA  Yellow   SPEC GRAV UA  1 010   PH UA  7 5   GLUCOSE UA mg/dl Negative   KETONES UA mg/dl Negative   BLOOD UA  Negative   PROTEIN UA mg/dl Negative   NITRITE UA  Negative   BILIRUBIN UA  Negative   UROBILINOGEN UA E U /dl 0 2   LEUKOCYTES UA  Negative     CXR=Right lower lobe pneumonia  CT CHEST, A/P=Diffuse airspace opacities within the lungs and consolidation in the right lower lobe  Findings likely represent pneumonia  Follow-up to resolution is recommended    ED Treatment:   Medication Administration from 10/21/2019 2120 to 10/22/2019 0146       Date/Time Order Dose Route     10/21/2019 2157 sodium chloride 0 9 % bolus 1,000 mL 1,000 mL Intravenous     10/21/2019 2206 acetaminophen (TYLENOL) tablet 650 mg 650 mg Oral     10/21/2019 2205 ondansetron (ZOFRAN) injection 4 mg 4 mg Intravenous     10/21/2019 2320 piperacillin-tazobactam (ZOSYN) IVPB 3 375 g 3 375 g Intravenous     10/21/2019 2301 iohexol (OMNIPAQUE) 350 MG/ML injection (SINGLE-DOSE) 100 mL 100 mL Intravenous     10/22/2019 0009 sodium chloride 0 9 % bolus 1,000 mL 1,000 mL Intravenous     10/22/2019 0045 potassium chloride 20 mEq IVPB (premix) 20 mEq Intravenous        Past Medical History:   Diagnosis Date    Arthritis     Disease of thyroid gland     History of transfusion    Admitting Diagnosis: Pneumonia [J18 9]  Fever [R50 9]  Post-operative state [Z98 890]  Sepsis ([address] ) [A41 9]  Age/Sex: 62 y o  female  Admission Orders:  TELEMETRY  Scheduled Meds  acetaminophen 650 mg Oral Q6H PRN   cefepime 2,000 mg Intravenous Q12H   cyclobenzaprine 10 mg Oral TID PRN   diazepam 2 mg Oral Q6H PRN   heparin (porcine) 5,000 Units Subcutaneous Q8H [address]   metroNIDAZOLE 500 mg Intravenous Q8H   ondansetron 4 mg Intravenous Q8H PRN   oxyCODONE 5 mg Oral Q4H PRN   vancomycin 15 mg/kg (Adjusted) Intravenous Q12H   [facility] [address] Utilization Review Department  Phone: [phone]; Fax [phone]  [email or web address]  ATTENTION: Please call with any questions or concerns to [phone]  and carefully listen to the prompts so that you are directed to the right person  Send all requests for admission clinical reviews, approved or denied determinations and any other requests to fax [phone]   All voicemails are confidential

## 2019-10-22 NOTE — ASSESSMENT & PLAN NOTE
Pt reports this has been ongoing for several days    At the time of presentation likely related to right lower lobe pneumonia  Patient history of nausea vomiting yesterday after ISIDRO, now improved  No right upper quadrant tenderness and remained pain-free  LFT remains stable  Ultrasound without any abnormalities  No further GI symptoms  Tolerating diet well

## 2019-10-22 NOTE — H&P
History and Physical - [facility]    Patient Information: Syliva Alvarez 62 y o  female MRN: [id number]  Unit/Bed#: ED 06 Encounter: [id number]  Admitting Physician: Christina Diaz MD  PCP: Viktor Mcdermott MD  Date of Admission:  10/22/19    Chief Complaint:     Nausea/vomiting   of Present Illness:    Sylvia Alvarez is a 62 y o  female who presents with nausea, vomiting, fever and myalgias  She states that she underwent a ISIDRO today and tolerated it without incident  However later in the evening she became extremely nauseas and vomited several times  She then began to have chills and diffuse myalgias  In the ER she was found to be quite febrile with CT chest imaging concerning for pneumonia  Currently she denies any chest pain, shortness of breath or cough  She does report RUQ abdominal pain for the past 3 days which radiates to her back  She complains of her usual chronic pain in her neck, hips and joints  Review of Systems:    Review of Systems   Constitutional: Positive for chills and fever  Respiratory: Negative for cough and shortness of breath  Cardiovascular: Positive for leg swelling  Negative for chest pain  Gastrointestinal: Positive for abdominal pain, nausea and vomiting  Negative for blood in stool  Genitourinary: Negative for hematuria  Musculoskeletal: Positive for arthralgias and myalgias  Neurological: Negative for syncope  Psychiatric/Behavioral: Negative for confusion  Past Medical and Surgical History:     History reviewed  No pertinent past medical history  History reviewed  No pertinent surgical history  Meds/Allergies:    PTA meds:   Prior to Admission Medications   Prescriptions Last Dose Informant Patient Reported? Taking?    cyclobenzaprine (FLEXERIL) 10 mg tablet   Yes Yes   Sig: Take 10 mg by mouth 3 (three) times a day as needed for muscle spasms   diazepam (VALIUM) 2 mg tablet   Yes Yes   Sig: Take 2 mg by mouth every 6 (six) hours as needed for anxiety   oxyCODONE-acetaminophen (PERCOCET) 5-325 mg per tablet   Yes Yes   Sig: Take 1 tablet by mouth every 4 (four) hours as needed for moderate pain      Facility-Administered Medications: None       Allergies: No Known Allergies  History:     Marital Status: /Civil Union   Social History     Substance and Sexual Activity   Alcohol Use Never    Frequency: Never     Social History     Tobacco Use   Smoking Status Former Smoker   Smokeless Tobacco Never Used     Social History     Substance and Sexual Activity   Drug Use Never       Family History:    History reviewed  No pertinent family history  Physical Exam:     Vitals:   Blood Pressure: (!) 87/52 (10/22/19 0030)  Pulse: (!) 108 (10/22/19 0030)  Temperature: 99 8 °F (37 7 °C) (10/21/19 2321)  Temp Source: Tympanic (10/21/19 2321)  Respirations: 16 (10/21/19 2345)  Height: 5' 4" (162 6 cm) (10/21/19 2126)  Weight - Scale: 74 8 kg (165 lb) (10/21/19 2126)  SpO2: 94 % (10/22/19 0030)    Physical Exam:   General: in no acute distress  HEENT: atraumatic, normocephalic  Skin: no jaundice, not diaphoretic  CVS: tachycardic, no murmurs appreciated  Lungs: crackles in right lung base, no wheezing  Abdomen: soft, nondistended, bowel sounds normal, mild RUQ tenderness upon palpation, no guarding or rebound tenderness  Extremities: no edema, no calf swelling or tenderness  Neuro: alert and oriented x3  Psych: anxious, cooperative      Lab Results: I have personally reviewed pertinent reports        Results from last 7 days   Lab Units 10/21/19  2158   WBC Thousand/uL 7 59   HEMOGLOBIN g/dL 14 3   HEMATOCRIT % 45 4   PLATELETS Thousands/uL 199   NEUTROS PCT % 91*   LYMPHS PCT % 6*   MONOS PCT % 3*   EOS PCT % 0     Results from last 7 days   Lab Units 10/21/19  2158   POTASSIUM mmol/L 3 3*   CHLORIDE mmol/L 105   CO2 mmol/L 30   BUN mg/dL 13   CREATININE mg/dL 0 81   CALCIUM mg/dL 8 5   ALK PHOS U/L 90   ALT U/L 6*   AST U/L 14     Results from last 7 days   Lab Units 10/21/19  2158   INR  1 01       Imaging: I have personally reviewed pertinent reports  Ct Chest Abdomen Pelvis W Contrast    Result Date: 10/22/2019  Narrative: CT CHEST, ABDOMEN AND PELVIS WITH IV CONTRAST INDICATION:   Sepsis  COMPARISON:  None  TECHNIQUE: CT examination of the chest, abdomen and pelvis was performed  Axial, sagittal, and coronal 2D reformatted images were created from the source data and submitted for interpretation  Radiation dose length product (DLP) for this visit:  506 46 mGy-cm   This examination, like all CT scans performed in the [facility], was performed utilizing techniques to minimize radiation dose exposure, including the use of iterative  reconstruction and automated exposure control  IV Contrast:  100 mL of iohexol (OMNIPAQUE) Enteric Contrast: Enteric contrast was administered  FINDINGS: CHEST LUNGS:  The trachea and central bronchial tree is patent  Diffuse airspace opacities are seen throughout the lungs  Consolidation is seen within the right lower lobe  PLEURA:  Unremarkable  HEART/GREAT VESSELS:  Small pericardial effusion is seen  MEDIASTINUM AND RIKKI:  Unremarkable  CHEST WALL AND LOWER NECK:   Unremarkable  ABDOMEN LIVER/BILIARY TREE:  Unremarkable  GALLBLADDER:  No calcified gallstones  No pericholecystic inflammatory change  SPLEEN:  Unremarkable  PANCREAS:  Unremarkable  ADRENAL GLANDS:  Unremarkable  KIDNEYS/URETERS:  Unremarkable  No hydronephrosis  STOMACH AND BOWEL:  Thickening of the colonic wall at the hepatic flexure is seen  Large amount of fecal material within the colon is seen  APPENDIX:  Not visualized ABDOMINOPELVIC CAVITY:  No ascites or free intraperitoneal air  No lymphadenopathy  VESSELS:  Unremarkable for patient's age  PELVIS REPRODUCTIVE ORGANS:  Unremarkable for patient's age  URINARY BLADDER:  Unremarkable  ABDOMINAL WALL/INGUINAL REGIONS:  Unremarkable   OSSEOUS STRUCTURES:  Scoliosis of the spine      Impression: Diffuse airspace opacities within the lungs and consolidation in the right lower lobe  Findings likely represent pneumonia  Follow-up to resolution is recommended  Workstation performed: [id number]         Assessment/Plan    * Sepsis [facility])  Assessment & Plan  POA - as evidenced by fever, tachycardia and right lung pneumonia  CT chest notes the following: Diffuse airspace opacities within the lungs and consolidation in the right lower lobe  Findings likely represent pneumonia    She was given a dose of Zosyn in the ED  Blood cultures collected  Influenza panel in process  Will admit to Medicine, start on Ceftriaxone, Azithromycin and add on Clindamycin due to concern for possible aspiration and check urine strep/legionella       RUQ abdominal pain  Assessment & Plan  Pt reports this has been ongoing for several days  LFTs are unremarkable  Also complains of nausea/vomiting  Will repeat LFTs later today and check RUQ ultrasound to assess her gallbladder     Chronic pain  Assessment & Plan  Continue home regimen     Hypokalemia  Assessment & Plan  Likely secondary to vomiting  Replacement ordered  Follow BMP later today     Pneumonia involving right lung  Assessment & Plan  Refer to above       Hospital Problem List:     Principal Problem:    Sepsis ([address] )  Active Problems:    RUQ abdominal pain    Pneumonia involving right lung    Hypokalemia    Chronic pain        VTE Prophylaxis: Heparin   Code Status: No Order    Anticipated Length of Stay:  Patient will be admitted on an Observation basis with an anticipated length of stay of atleast 1 midnights  Total Time for Visit, including Counseling / Coordination of Care: 30 minutes  Greater than 50% of this total time spent on direct patient counseling and coordination of care

## 2019-10-22 NOTE — ASSESSMENT & PLAN NOTE
Patient reported cough and right-sided chest pain over few days prior to hospitalization   Underwent ISIDRO day prior to presentation with subsequent nausea vomiting and likely aspiration  CT chest abdomen and pelvis revealed right lower lobe pneumonia  Suspected community-acquired plus/minus aspiration  Initially treated with azithromycin, ceftriaxone and clindamycin but remained hypotensive with worsening of leukocytosis  Blood cultures negative  Urine Legionella pneumococcal antigen, influenza PCR negative  MRSA surveillance negative  Unable to provide sputum culture as cough is significantly improved  Antimicrobial treatment was changed to vancomycin, cefepime and metronidazole  Remains afebrile, leukocytosis and Procalcitonin is trending down  Reports improvement in respiratory symptoms without any significant cough or shortness of breath at rest or with ambulation  Oxygenation is adequate at room air or with activity  Will transition to cefpodoxime and metronidazole to complete 7 days  Follow up with PMD after discharge  Repeat chest x-ray in 6-8 weeks

## 2019-10-22 NOTE — ASSESSMENT & PLAN NOTE
POA - as evidenced by fever, tachycardia and right lung pneumonia       lactic acid unremarkable  Remains hypotensive despite multiple IV fluid boluses including 30 cc/kg  Require transfer to step-down unit for close monitoring  Noted to have worsening of leukocytosis and elevated procalcitonin  Antibiotic treatment was changed as below  Blood pressure gradually stabilized, did not require pressors  Now blood pressure remained stable at baseline  Continue antimicrobial treatment as below

## 2019-10-22 NOTE — PROGRESS NOTES
[facility] Progress Note  Patient: Antonina  62 y o  female   MRN: [id number]  PCP: Pierre Turner MD  Unit/Bed#: [id number] [place] [address] Encounter: [id number]  Date Of Visit: 10/22/19    Problem List:    Principal Problem:    Sepsis ([facility] [address] )  Active Problems:    Pneumonia involving right lung    RUQ abdominal pain    Hypokalemia    Chronic pain    History of peptic ulcer disease      Assessment & Plan:    Pneumonia involving right lung  Assessment & Plan  Refer to above   Patient reported cough and right-sided chest pain over last few days yesterday worsened  Underwent ISIDRO yesterday with subsequent nausea vomiting unclear if she aspirated  CT chest abdomen and pelvis revealed right lower lobe pneumonia  Suspected community-acquired versus aspiration  Follow-up blood culture  Urine Legionella pneumococcal antigen, influenza PCR  Continue azithromycin, ceftriaxone and clindamycin  Supplemental oxygen as needed  Monitor clinically        * Sepsis ([facility] [address] )  Assessment & Plan  POA - as evidenced by fever, tachycardia and right lung pneumonia  Initial lactic acid unremarkable  Remains hypotensive, but appears clinically nontoxic  leukocytosis is worsening  Recheck lactic acid, IV fluid boluses 30 cc/kg  Monitor clinically with frequent vital signs and urinary output  Trend procalcitonin  Continue current antibiotics            RUQ abdominal pain  Assessment & Plan  Pt reports this has been ongoing for several days  ?  Related to above     Patient history of nausea vomiting yesterday after ISIDRO, now improved  No right upper quadrant tenderness  LFT remains stable  Follow-up ultrasound  Monitor clinically    History of peptic ulcer disease  Assessment & Plan  Patient reports remote history of peptic ulcer disease with bleeding  History of hiatal hernia status post repair  Denies any bleeding at present  Monitor clinically  Add famotidine      Chronic pain  Assessment & Plan  Continue home regimen Hypokalemia  Assessment & Plan  Likely secondary to vomiting  Repeated, improved    Addendum 12:30 p m  Patient was reassessed  Patient remains hypotensive despite IV fluid bolus  Remains afebrile  Saturation Is adequate but remains tachypneic  Reports good urine output  Discussed with ICU team, will transfer to SDU/ICU for further care  Discussed with nursing  VTE Pharmacologic Prophylaxis:   Pharmacologic: Heparin  Mechanical VTE Prophylaxis in Place: Yes    Patient Centered Rounds: I have performed bedside rounds with nursing staff today  Discussions with Specialists or Other Care Team Provider:  Yes    Education and Discussions with Family / Patient:  Yes    Time Spent for Care: 45 minutes  More than 50% of total time spent on counseling and coordination of care as described above  Current Length of Stay: 0 day(s)    Current Patient Status: Inpatient   Certification Statement: The patient will continue to require additional inpatient hospital stay due to Sepsis with hypotension  Patient will require continued hospitalization with IV antibiotics given sepsis and now developing hypotension as she is higher risk of clinical decompensation  Discharge Plan:  Pending at present    Code Status: Level 1 - Full Code      Subjective:   Noted to be hypotensive early morning  Reports feeling tired and headache  Reports right-sided chest pain which is pleuritic in nature and  Also report history of right-sided pain and intermittent cough over last few days  Afebrile overnight, reports frequent urination    Denies dysuria  Nausea vomiting has improved  Denies any abdominal pain or diarrhea   Patient reports she was NPO since day for midnight for ISIDRO yesterday which was unremarkable    Objective:     Vitals:   Temp (24hrs), Av 2 °F (37 3 °C), Min:97 8 °F (36 6 °C), Max:103 4 °F (39 7 °C)    Temp:  [97 8 °F (36 6 °C)-103 4 °F (39 7 °C)] 98 5 °F (36 9 °C)  HR:  [] 84  Resp:  [14-20] 19  BP: ()/(49-72) 89/52  SpO2:  [93 %-97 %] 96 %  Body mass index is 28 91 kg/m²  Input and Output Summary (last 24 hours): Intake/Output Summary (Last 24 hours) at 10/22/2019 0952  Last data filed at 10/22/2019 0725  Gross per 24 hour   Intake 1000 ml   Output 1075 ml   Net -75 ml       Physical Exam:     Physical Exam   Constitutional: She is oriented to person, place, and time  She appears well-developed  No distress  Dry mucosa   HENT:   Head: Normocephalic and atraumatic  Nose: Nose normal    Eyes: Pupils are equal, round, and reactive to light  Conjunctivae are normal    Neck: Normal range of motion  Neck supple  Cardiovascular: Normal rate, regular rhythm, normal heart sounds and intact distal pulses  Pulmonary/Chest: Effort normal and breath sounds normal  No respiratory distress  She has no wheezes  She has no rales  Diminished on right-side   Abdominal: Soft  Bowel sounds are normal  She exhibits no distension  There is no tenderness  There is no rebound and no guarding  No right upper quadrant or CVA tenderness   Musculoskeletal: Normal range of motion  She exhibits no edema  Neurological: She is alert and oriented to person, place, and time  No cranial nerve deficit  Skin: Skin is warm and dry  Capillary refill takes less than 2 seconds  No rash noted  Psychiatric: She has a normal mood and affect         Additional Data:     Labs:    Results from last 7 days   Lab Units 10/22/19  0546   WBC Thousand/uL 12 53*   HEMOGLOBIN g/dL 12 0   HEMATOCRIT % 38 7   PLATELETS Thousands/uL 181   NEUTROS PCT % 89*   LYMPHS PCT % 6*   MONOS PCT % 4   EOS PCT % 0     Results from last 7 days   Lab Units 10/22/19  0546   POTASSIUM mmol/L 4 1   CHLORIDE mmol/L 111*   CO2 mmol/L 24   BUN mg/dL 9   CREATININE mg/dL 0 80   CALCIUM mg/dL 7 0*   ALK PHOS U/L 65   ALT U/L 9*   AST U/L 10     Results from last 7 days   Lab Units 10/21/19  2158   INR  1 01       * I Have Reviewed All Lab Data Listed Above   * Additional Pertinent Lab Tests Reviewed: All Labs Within Last 24 Hours Reviewed      Imaging:  Imaging Reports Reviewed Today Include:  CT scan    Recent Cultures (last 7 days):           Last 24 Hours Medication List:     Current Facility-Administered Medications:  acetaminophen 650 mg Oral Q6H PRN Ginna Colin MD    azithromycin 500 mg Oral Q24H Ginna Colin MD    cefTRIAXone 1,000 mg Intravenous Q24H Ginna Colin MD Last Rate: 1,000 mg (10/22/19 0839)   clindamycin 600 mg Intravenous Q8H Ginna Colin MD Last Rate: 600 mg (10/22/19 0756)   cyclobenzaprine 10 mg Oral TID PRN Ginna Colin MD    diazepam 2 mg Oral Q6H PRN Ginna Colin MD    heparin (porcine) 5,000 Units Subcutaneous Q8H [address] Ginna Colin MD    multi-electrolyte 1,000 mL Intravenous Once Tere Gonzalez MD    Followed by        Keila Sung ON 10/23/2019] multi-electrolyte 1,000 mL Intravenous Once Tere Gonzalez MD    Followed by        Keila Sung ON 10/23/2019] multi-electrolyte 1,000 mL Intravenous Once Tere Gonzalez MD    oxyCODONE-acetaminophen 1 tablet Oral Q4H PRN Ginna Colin MD    sodium chloride 1,000 mL Intravenous Once Mary Ellen Fairbanks DO Last Rate: 1,000 mL (10/22/19 0800)          Today, Patient Was Seen By: Tere Gonzalez MD    ** Please Note: "This note has been constructed using a voice recognition system  Therefore there may be syntax, spelling, and/or grammatical errors   Please call if you have any questions  "**

## 2019-10-22 NOTE — PLAN OF CARE
Problem: Potential for Falls  Goal: Patient will remain free of falls  Description  INTERVENTIONS:  - Assess patient frequently for physical needs  -  Identify cognitive and physical deficits and behaviors that affect risk of falls    -  [place] fall precautions as indicated by assessment   - Educate patient/family on patient safety including physical limitations  - Instruct patient to call for assistance with activity based on assessment  - Modify environment to reduce risk of injury  - Consider OT/PT consult to assist with strengthening/mobility  Outcome: Progressing     Problem: PAIN - ADULT  Goal: Verbalizes/displays adequate comfort level or baseline comfort level  Description  Interventions:  - Encourage patient to monitor pain and request assistance  - Assess pain using appropriate pain scale  - Administer analgesics based on type and severity of pain and evaluate response  - Implement non-pharmacological measures as appropriate and evaluate response  - Consider cultural and social influences on pain and pain management  - Notify physician/advanced practitioner if interventions unsuccessful or patient reports new pain  Outcome: Progressing     Problem: INFECTION - ADULT  Goal: Absence or prevention of progression during hospitalization  Description  INTERVENTIONS:  - Assess and monitor for signs and symptoms of infection  - Monitor lab/diagnostic results  - Monitor all insertion sites, i e  indwelling lines, tubes, and drains  - Monitor endotracheal if appropriate and nasal secretions for changes in amount and color  - [place] appropriate cooling/warming therapies per order  - Administer medications as ordered  - Instruct and encourage patient and family to use good hand hygiene technique  - Identify and instruct in appropriate isolation precautions for identified infection/condition  Outcome: Progressing     Problem: SAFETY ADULT  Goal: Patient will remain free of falls  Description  INTERVENTIONS:  - Assess patient frequently for physical needs  -  Identify cognitive and physical deficits and behaviors that affect risk of falls    -  [place] fall precautions as indicated by assessment   - Educate patient/family on patient safety including physical limitations  - Instruct patient to call for assistance with activity based on assessment  - Modify environment to reduce risk of injury  - Consider OT/PT consult to assist with strengthening/mobility  Outcome: Progressing  Goal: Maintain or return to baseline ADL function  Description  INTERVENTIONS:  -  Assess patient's ability to carry out ADLs; assess patient's baseline for ADL function and identify physical deficits which impact ability to perform ADLs (bathing, care of mouth/teeth, toileting, grooming, dressing, etc )  - Assess/evaluate cause of self-care deficits   - Assess range of motion  - Assess patient's mobility; develop plan if impaired  - Assess patient's need for assistive devices and provide as appropriate  - Encourage maximum independence but intervene and supervise when necessary  - Involve family in performance of ADLs  - Assess for home care needs following discharge   - Consider OT consult to assist with ADL evaluation and planning for discharge  - Provide patient education as appropriate  Outcome: Progressing  Goal: Maintain or return mobility status to optimal level  Description  INTERVENTIONS:  - Assess patient's baseline mobility status (ambulation, transfers, stairs, etc )    - Identify cognitive and physical deficits and behaviors that affect mobility  - Identify mobility aids required to assist with transfers and/or ambulation (gait belt, sit-to-stand, lift, walker, cane, etc )  - [place] fall precautions as indicated by assessment  - Record patient progress and toleration of activity level on Mobility SBAR; progress patient to next Phase/Stage  - Instruct patient to call for assistance with activity based on assessment  - Consider rehabilitation consult to assist with strengthening/weightbearing, etc   Outcome: Progressing     Problem: DISCHARGE PLANNING  Goal: Discharge to home or other facility with appropriate resources  Description  INTERVENTIONS:  - Identify barriers to discharge w/patient and caregiver  - Arrange for needed discharge resources and transportation as appropriate  - Identify discharge learning needs (meds, wound care, etc )  - Arrange for interpretive services to assist at discharge as needed  - Refer to Case Management Department for coordinating discharge planning if the patient needs post-hospital services based on physician/advanced practitioner order or complex needs related to functional status, cognitive ability, or social support system  Outcome: Progressing     Problem: Knowledge Deficit  Goal: Patient/family/caregiver demonstrates understanding of disease process, treatment plan, medications, and discharge instructions  Description  Complete learning assessment and assess knowledge base    Interventions:  - Provide teaching at level of understanding  - Provide teaching via preferred learning methods  Outcome: Progressing

## 2019-10-22 NOTE — SEPSIS NOTE
Sepsis Note   Josefa Cornejo 62 y o  female MRN: [id number]  Unit/Bed#: [address] [place] [address] Encounter: [id number]      qSOFA     [address] Name 10/22/19 3538 10/22/19 0551 10/22/19 0315 10/22/19 0159 10/22/19 0115    Altered mental status GCS < 15  --  --  --  --  --    Respiratory Rate > / =22  0  0  0  0  0    Systolic BP < / =194  1  0  1  1  1    Q Sofa Score  1  0  1  1  1    Row Name 10/22/19 0100 10/22/19 0030 10/21/19 2345 10/21/19 2135 10/21/19 2130    Altered mental status GCS < 15  --  --  --  --  --    Respiratory Rate > / =22  0  --  0  --  0    Systolic BP < / =544  0  1  1  0  --    Q Sofa Score  0  1  1  0  --        Initial Sepsis Screening     Row Name 10/22/19 0337 10/21/19 2346             Is the patient's history suggestive of a new or worsening infection? (!) Yes (Proceed)  -DP  --       Suspected source of infection  pneumonia  -DP  pneumonia;acute abdominal infection  -ST       Are two or more of the following signs & symptoms of infection both present and new to the patient? (!) Yes (Proceed)  -DP  (!) Yes (Proceed)  -ST       Indicate SIRS criteria  Leukocytosis (WBC > 30267 IJL); Hyperthemia > 38 3C (100 9F)  -DP  Hyperthemia > 38 3C (100 9F); Tachycardia > 90 bpm  -ST       If the answer is yes to both questions, suspicion of sepsis is present  --  --       If severe sepsis is present AND tissue hypoperfusion perists in the hour after fluid resuscitation or lactate > 4, the patient meets criteria for SEPTIC SHOCK  --  --       Are any of the following organ dysfunction criteria present within 6 hours of suspected infection and SIRS criteria that are NOT considered to be chronic conditions?   (!) Yes  -DP  No  -ST       Organ dysfunction  SBP < 90 mmHg  -DP  --       Date of presentation of severe sepsis  --  --       Time of presentation of severe sepsis  --  --       Tissue hypoperfusion persists in the hour after crystalloid fluid administration, evidenced, by either:  --  --       Was hypotension present within one hour of the conclusion of crystalloid fluid administration?  --  --       Date of presentation of septic shock  --  --       Time of presentation of septic shock  --  --         User Key  (r) = Recorded By, (t) = Taken By, (c) = Cosigned By    [address] Name Provider Jhonatan Watts MD Physician    Katy Whatley MD Physician

## 2019-10-22 NOTE — ASSESSMENT & PLAN NOTE
Patient reports remote history of peptic ulcer disease with bleeding  History of hiatal hernia status post repair  Denies any bleeding at present, hemoglobin stable

## 2019-10-22 NOTE — ASSESSMENT & PLAN NOTE
· Received multiple IVF boluses to include 30 cc/kg of crystalloid but did not seem to respond, BP's remain in the 80's  · Brought down to SD for closer observation  · On arrival to ICU by 978 systolic, will hold on pressors for now  · Start Levophed if BP <90  · Lactate WNL

## 2019-10-22 NOTE — ED PROVIDER NOTES
History  Chief Complaint   Patient presents with    Post-op Problem     Patient statse that she had a ISIDRO done today by Dr Marylou Roy at [facility]  States that sh doesn't feel well after it  63 yo female had ISIDRO done at [facility] at 2:45 this afternoon  She had that done because they thought they something on the regular echo  She was told by the doctor who did it that it was normal   She went home and about 7 pm started to feel ill  She developed abdominal pain, nausea, vomiting and severe body aches  She took her temp at home and it was ok then  She called her doctor who told her to come here instead of [facility] because that was farther away  No diarrhea  No congestion/cough  History provided by:  Patient   used: No        Prior to Admission Medications   Prescriptions Last Dose Informant Patient Reported? Taking? cyclobenzaprine (FLEXERIL) 10 mg tablet   Yes Yes   Sig: Take 10 mg by mouth 3 (three) times a day as needed for muscle spasms   diazepam (VALIUM) 2 mg tablet   Yes Yes   Sig: Take 2 mg by mouth every 6 (six) hours as needed for anxiety   oxyCODONE-acetaminophen (PERCOCET) 5-325 mg per tablet   Yes Yes   Sig: Take 1 tablet by mouth every 4 (four) hours as needed for moderate pain      Facility-Administered Medications: None       History reviewed  No pertinent past medical history  History reviewed  No pertinent surgical history  History reviewed  No pertinent family history  I have reviewed and agree with the history as documented  Social History     Tobacco Use    Smoking status: Former Smoker    Smokeless tobacco: Never Used   Substance Use Topics    Alcohol use: Never     Frequency: Never    Drug use: Never        Review of Systems   Constitutional: Positive for chills  Negative for fever  HENT: Negative  Negative for congestion  Eyes: Negative  Respiratory: Negative  Negative for cough and shortness of breath      Cardiovascular: Negative  Negative for chest pain  Gastrointestinal: Positive for abdominal pain, nausea and vomiting  Negative for diarrhea  Genitourinary: Negative  Negative for dysuria and flank pain  Musculoskeletal: Positive for myalgias  Negative for back pain  Skin: Negative  Negative for rash  Neurological: Negative  Negative for dizziness and headaches  Hematological: Does not bruise/bleed easily  Psychiatric/Behavioral: Negative  All other systems reviewed and are negative  Physical Exam  Physical Exam   Constitutional: She is oriented to person, place, and time  She appears well-developed and well-nourished  She appears distressed  + fever, appears ill   HENT:   Head: Normocephalic and atraumatic  Mm dry   Eyes: Conjunctivae are normal    Neck: Normal range of motion  Neck supple  Cardiovascular: Regular rhythm, normal heart sounds and intact distal pulses  Tachycardia present  No murmur heard  Pulmonary/Chest: Effort normal and breath sounds normal  No respiratory distress  Abdominal: Soft  Bowel sounds are normal  She exhibits no distension  There is tenderness  Tender to palpation epigastrium, right upper quad and across lower abdomen   Musculoskeletal: Normal range of motion  She exhibits no edema or deformity  Neurological: She is alert and oriented to person, place, and time  No cranial nerve deficit  She exhibits normal muscle tone  Skin: Skin is warm and dry  No rash noted  She is not diaphoretic  There is pallor  Psychiatric: She has a normal mood and affect  Her behavior is normal    Nursing note and vitals reviewed        Vital Signs  ED Triage Vitals   Temperature Pulse Respirations Blood Pressure SpO2   10/21/19 2130 10/21/19 2130 10/21/19 2130 10/21/19 2135 10/21/19 2130   (!) 103 4 °F (39 7 °C) (!) 135 18 129/72 93 %      Temp Source Heart Rate Source Patient Position - Orthostatic VS BP Location FiO2 (%)   10/21/19 2130 -- 10/21/19 2135 10/21/19 2135 -- Tympanic  Lying Left arm       Pain Score       10/21/19 2126       8           Vitals:    10/21/19 2130 10/21/19 2135 10/21/19 2345   BP:  129/72 96/55   Pulse: (!) 135  (!) 110   Patient Position - Orthostatic VS:  Lying          Visual Acuity      ED Medications  Medications   sodium chloride 0 9 % bolus 1,000 mL (1,000 mL Intravenous New Bag 10/22/19 0009)   sodium chloride 0 9 % bolus 1,000 mL (0 mL Intravenous Stopped 10/22/19 0009)   acetaminophen (TYLENOL) tablet 650 mg (650 mg Oral Given 10/21/19 2206)   ondansetron (ZOFRAN) injection 4 mg (4 mg Intravenous Given 10/21/19 2205)   piperacillin-tazobactam (ZOSYN) IVPB 3 375 g (0 g Intravenous Stopped 10/21/19 2350)   iohexol (OMNIPAQUE) 350 MG/ML injection (SINGLE-DOSE) 100 mL (100 mL Intravenous Given 10/21/19 2301)       Diagnostic Studies  Results Reviewed     Procedure Component Value Units Date/Time    UA w Reflex to Microscopic w Reflex to Culture [[id number]] Collected:  10/21/19 2317    Lab Status:  Final result Specimen:  Urine, Clean Catch Updated:  10/21/19 2327     Color, UA Yellow     Clarity, UA Clear     Specific Gravity, UA 1 010     pH, UA 7 5     Leukocytes, UA Negative     Nitrite, UA Negative     Protein, UA Negative mg/dl      Glucose, UA Negative mg/dl      Ketones, UA Negative mg/dl      Urobilinogen, UA 0 2 E U /dl      Bilirubin, UA Negative     Blood, UA Negative    INFLUENZA A/B AND RSV, PCR [[id number]] Collected:  10/21/19 2317    Lab Status: In process Specimen:  Nasopharyngeal Swab Updated:  10/21/19 2324    Procalcitonin [[id number]] Collected:  10/21/19 2158    Lab Status:   In process Specimen:  Blood from Arm, Left Updated:  10/21/19 2235    CBC and differential [[id number]]  (Abnormal) Collected:  10/21/19 2158    Lab Status:  Final result Specimen:  Blood from Arm, Left Updated:  10/21/19 2234     WBC 7 59 Thousand/uL      RBC 5 01 Million/uL      Hemoglobin 14 3 g/dL      Hematocrit 45 4 %      MCV 91 fL      MCH 28 5 pg MCHC 31 5 g/dL      RDW 12 5 %      MPV 9 0 fL      Platelets 397 Thousands/uL      nRBC 0 /100 WBCs      Neutrophils Relative 91 %      Immat GRANS % 0 %      Lymphocytes Relative 6 %      Monocytes Relative 3 %      Eosinophils Relative 0 %      Basophils Relative 0 %      Neutrophils Absolute 6 83 Thousands/µL      Immature Grans Absolute 0 03 Thousand/uL      Lymphocytes Absolute 0 45 Thousands/µL      Monocytes Absolute 0 25 Thousand/µL      Eosinophils Absolute 0 01 Thousand/µL      Basophils Absolute 0 02 Thousands/µL     Narrative: This is an appended report  These results have been appended to a previously verified report  Lactic Acid x2 [[id number]]  (Normal) Collected:  10/21/19 2158    Lab Status:  Final result Specimen:  Blood from Arm, Left Updated:  10/21/19 2227     LACTIC ACID 1 5 mmol/L     Narrative:       Result may be elevated if tourniquet was used during collection      Comprehensive metabolic panel [[id number]]  (Abnormal) Collected:  10/21/19 2158    Lab Status:  Final result Specimen:  Blood from Arm, Left Updated:  10/21/19 2226     Sodium 141 mmol/L      Potassium 3 3 mmol/L      Chloride 105 mmol/L      CO2 30 mmol/L      ANION GAP 6 mmol/L      BUN 13 mg/dL      Creatinine 0 81 mg/dL      Glucose 101 mg/dL      Calcium 8 5 mg/dL      AST 14 U/L      ALT 6 U/L      Alkaline Phosphatase 90 U/L      Total Protein 6 3 g/dL      Albumin 3 1 g/dL      Total Bilirubin 0 40 mg/dL      eGFR 79 ml/min/1 73sq m     Narrative:       [place] guidelines for Chronic Kidney Disease (CKD):     Stage 1 with normal or high GFR (GFR > 90 mL/min/1 73 square meters)    Stage 2 Mild CKD (GFR = 60-89 mL/min/1 73 square meters)    Stage 3A Moderate CKD (GFR = 45-59 mL/min/1 73 square meters)    Stage 3B Moderate CKD (GFR = 30-44 mL/min/1 73 square meters)    Stage 4 Severe CKD (GFR = 15-29 mL/min/1 73 square meters)    Stage 5 End Stage CKD (GFR <15 mL/min/1 73 square meters)  Note: GFR calculation is accurate only with a steady state creatinine    Protime-INR [[id number]]  (Normal) Collected:  10/21/19 2158    Lab Status:  Final result Specimen:  Blood from Arm, Left Updated:  10/21/19 2225     Protime 10 8 seconds      INR 1 01    APTT [[id number]]  (Normal) Collected:  10/21/19 2158    Lab Status:  Final result Specimen:  Blood from Arm, Left Updated:  10/21/19 2225     PTT 25 seconds     Blood culture #2 [[id number]] Collected:  10/21/19 2158    Lab Status: In process Specimen:  Blood from Arm, Left Updated:  10/21/19 2204    Blood culture #1 [[id number]] Collected:  10/21/19 2156    Lab Status: In process Specimen:  Blood from Arm, Left Updated:  10/21/19 2204                 CT chest abdomen pelvis w contrast   Final Result by Ambar Castaneda DO (10/22 0006)      Diffuse airspace opacities within the lungs and consolidation in the right lower lobe  Findings likely represent pneumonia  Follow-up to resolution is recommended  Workstation performed: [id number]         XR chest 1 view portable   ED Interpretation by Jennifer Dawson MD (11/81 0561)   ? Right infiltrate                 Procedures  Procedures       ED Course                   Initial Sepsis Screening     Row Name 10/21/19 4797                Is the patient's history suggestive of a new or worsening infection?  --        Suspected source of infection  pneumonia;acute abdominal infection  -ST        Are two or more of the following signs & symptoms of infection both present and new to the patient? (!) Yes (Proceed)  -ST        Indicate SIRS criteria  Hyperthemia > 38 3C (100 9F); Tachycardia > 90 bpm  -ST        If the answer is yes to both questions, suspicion of sepsis is present  --        If severe sepsis is present AND tissue hypoperfusion perists in the hour after fluid resuscitation or lactate > 4, the patient meets criteria for SEPTIC SHOCK  --        Are any of the following organ dysfunction criteria present within 6 hours of suspected infection and SIRS criteria that are NOT considered to be chronic conditions? No  -ST        Organ dysfunction  --        Date of presentation of severe sepsis  --        Time of presentation of severe sepsis  --        Tissue hypoperfusion persists in the hour after crystalloid fluid administration, evidenced, by either:  --        Was hypotension present within one hour of the conclusion of crystalloid fluid administration?  --        Date of presentation of septic shock  --        Time of presentation of septic shock  --          User Key  (r) = Recorded By, (t) = Taken By, (c) = Cosigned By    [address] Name Provider Vesna Mauricio MD Physician                  MDM  Number of Diagnoses or Management Options  Fever:   Pneumonia:   Sepsis [facility]):   Diagnosis management comments: 2345 - temp down, pt  Feeling better, SBP 96, labs ok  CT results pending  Pt  Aware  0020 - CT scan confirms right sided pneumonia  Not sure if could be aspiration pneumonitis that occurred during the procedure  I feel pt  Should be admitted for observation, IVF and abx    Discussed with hospitalist       Disposition  Final diagnoses:   Fever   Sepsis ([facility] [address] )   Pneumonia     Time reflects when diagnosis was documented in both MDM as applicable and the Disposition within this note     Time User Action Codes Description Comment    10/78/0780 85:15 AM Denominational Torsten A Add [Q73 9] Fever     30/65/1693 57:94 AM Denominational Torsten A Add [Q89 4] Sepsis ([facility] [address] )     12/22/6729 04:01 AM Bri Petties Add [U47 5] Pneumonia       ED Disposition     ED Disposition Condition Date/Time Comment    Admit Stable Tue Oct 22, 2019 12:25 AM Case was discussed with *hospitalist** and the patient's admission status was agreed to be Admission Status: observation status       Follow-up Information    None         Patient's Medications   Discharge Prescriptions    No medications on file     No discharge procedures on file     ED Provider  Electronically Signed by           Barry Templeton MD  76/07/90 6720

## 2019-10-23 PROBLEM — R11.2 NAUSEA & VOMITING: Status: ACTIVE | Noted: 2019-10-23

## 2019-10-23 LAB
ANION GAP SERPL CALCULATED.3IONS-SCNC: 7 MMOL/L (ref 4–13)
BASOPHILS # BLD AUTO: 0.02 THOUSANDS/ΜL (ref 0–0.1)
BASOPHILS NFR BLD AUTO: 0 % (ref 0–1)
BUN SERPL-MCNC: 7 MG/DL (ref 5–25)
CALCIUM SERPL-MCNC: 8 MG/DL (ref 8.3–10.1)
CHLORIDE SERPL-SCNC: 104 MMOL/L (ref 100–108)
CO2 SERPL-SCNC: 24 MMOL/L (ref 21–32)
CREAT SERPL-MCNC: 0.67 MG/DL (ref 0.6–1.3)
EOSINOPHIL # BLD AUTO: 0.09 THOUSAND/ΜL (ref 0–0.61)
EOSINOPHIL NFR BLD AUTO: 1 % (ref 0–6)
ERYTHROCYTE [DISTWIDTH] IN BLOOD BY AUTOMATED COUNT: 12.8 % (ref 11.6–15.1)
GFR SERPL CREATININE-BSD FRML MDRD: 95 ML/MIN/1.73SQ M
GLUCOSE SERPL-MCNC: 134 MG/DL (ref 65–140)
HCT VFR BLD AUTO: 37.4 % (ref 34.8–46.1)
HGB BLD-MCNC: 11.9 G/DL (ref 11.5–15.4)
IMM GRANULOCYTES # BLD AUTO: 0.06 THOUSAND/UL (ref 0–0.2)
IMM GRANULOCYTES NFR BLD AUTO: 1 % (ref 0–2)
LYMPHOCYTES # BLD AUTO: 0.75 THOUSANDS/ΜL (ref 0.6–4.47)
LYMPHOCYTES NFR BLD AUTO: 8 % (ref 14–44)
MAGNESIUM SERPL-MCNC: 2.1 MG/DL (ref 1.6–2.6)
MCH RBC QN AUTO: 29.3 PG (ref 26.8–34.3)
MCHC RBC AUTO-ENTMCNC: 31.8 G/DL (ref 31.4–37.4)
MCV RBC AUTO: 92 FL (ref 82–98)
MONOCYTES # BLD AUTO: 0.45 THOUSAND/ΜL (ref 0.17–1.22)
MONOCYTES NFR BLD AUTO: 5 % (ref 4–12)
MRSA NOSE QL CULT: NORMAL
NEUTROPHILS # BLD AUTO: 7.67 THOUSANDS/ΜL (ref 1.85–7.62)
NEUTS SEG NFR BLD AUTO: 85 % (ref 43–75)
NRBC BLD AUTO-RTO: 0 /100 WBCS
PLATELET # BLD AUTO: 172 THOUSANDS/UL (ref 149–390)
PMV BLD AUTO: 9.4 FL (ref 8.9–12.7)
POTASSIUM SERPL-SCNC: 3.7 MMOL/L (ref 3.5–5.3)
PROCALCITONIN SERPL-MCNC: 6.79 NG/ML
RBC # BLD AUTO: 4.06 MILLION/UL (ref 3.81–5.12)
SODIUM SERPL-SCNC: 135 MMOL/L (ref 136–145)
WBC # BLD AUTO: 9.04 THOUSAND/UL (ref 4.31–10.16)

## 2019-10-23 PROCEDURE — 83735 ASSAY OF MAGNESIUM: CPT | Performed by: NURSE PRACTITIONER

## 2019-10-23 PROCEDURE — NC001 PR NO CHARGE: Performed by: NURSE PRACTITIONER

## 2019-10-23 PROCEDURE — 99233 SBSQ HOSP IP/OBS HIGH 50: CPT | Performed by: ANESTHESIOLOGY

## 2019-10-23 PROCEDURE — 84145 PROCALCITONIN (PCT): CPT | Performed by: NURSE PRACTITIONER

## 2019-10-23 PROCEDURE — 80048 BASIC METABOLIC PNL TOTAL CA: CPT | Performed by: NURSE PRACTITIONER

## 2019-10-23 PROCEDURE — 85025 COMPLETE CBC W/AUTO DIFF WBC: CPT | Performed by: NURSE PRACTITIONER

## 2019-10-23 RX ORDER — ONDANSETRON 4 MG/1
4 TABLET, ORALLY DISINTEGRATING ORAL EVERY 6 HOURS PRN
Status: DISCONTINUED | OUTPATIENT
Start: 2019-10-23 | End: 2019-10-23

## 2019-10-23 RX ORDER — ONDANSETRON 2 MG/ML
4 INJECTION INTRAMUSCULAR; INTRAVENOUS EVERY 8 HOURS PRN
Status: DISCONTINUED | OUTPATIENT
Start: 2019-10-23 | End: 2019-10-24 | Stop reason: HOSPADM

## 2019-10-23 RX ORDER — POTASSIUM CHLORIDE 14.9 MG/ML
20 INJECTION INTRAVENOUS ONCE
Status: COMPLETED | OUTPATIENT
Start: 2019-10-23 | End: 2019-10-23

## 2019-10-23 RX ORDER — ONDANSETRON 4 MG/1
4 TABLET, ORALLY DISINTEGRATING ORAL ONCE
Status: COMPLETED | OUTPATIENT
Start: 2019-10-23 | End: 2019-10-23

## 2019-10-23 RX ADMIN — ONDANSETRON 4 MG: 4 TABLET, ORALLY DISINTEGRATING ORAL at 04:08

## 2019-10-23 RX ADMIN — VANCOMYCIN HYDROCHLORIDE 1000 MG: 1 INJECTION, SOLUTION INTRAVENOUS at 16:42

## 2019-10-23 RX ADMIN — POTASSIUM CHLORIDE 20 MEQ: 14.9 INJECTION, SOLUTION INTRAVENOUS at 07:51

## 2019-10-23 RX ADMIN — HEPARIN SODIUM 5000 UNITS: 5000 INJECTION INTRAVENOUS; SUBCUTANEOUS at 21:24

## 2019-10-23 RX ADMIN — CEFEPIME HYDROCHLORIDE 2000 MG: 2 INJECTION, POWDER, FOR SOLUTION INTRAVENOUS at 03:15

## 2019-10-23 RX ADMIN — CEFEPIME HYDROCHLORIDE 2000 MG: 2 INJECTION, POWDER, FOR SOLUTION INTRAVENOUS at 15:28

## 2019-10-23 RX ADMIN — HEPARIN SODIUM 5000 UNITS: 5000 INJECTION INTRAVENOUS; SUBCUTANEOUS at 14:43

## 2019-10-23 RX ADMIN — ONDANSETRON 4 MG: 2 INJECTION INTRAMUSCULAR; INTRAVENOUS at 10:32

## 2019-10-23 RX ADMIN — METRONIDAZOLE 500 MG: 500 INJECTION, SOLUTION INTRAVENOUS at 21:24

## 2019-10-23 RX ADMIN — METRONIDAZOLE 500 MG: 500 INJECTION, SOLUTION INTRAVENOUS at 10:07

## 2019-10-23 RX ADMIN — METRONIDAZOLE 500 MG: 500 INJECTION, SOLUTION INTRAVENOUS at 14:44

## 2019-10-23 RX ADMIN — VANCOMYCIN HYDROCHLORIDE 1000 MG: 1 INJECTION, SOLUTION INTRAVENOUS at 04:01

## 2019-10-23 NOTE — ASSESSMENT & PLAN NOTE
· Was admitted to [facility] service and placed on Rocephin, Azithro and Clinda  · Because of worsening BP's will increase to Cefepime, Vanco and Flagyl  · Sp02 93% on room air  · Strep pneumo/legionella negative   · Encourage I/S  · Pulm toilet  · Procal 9 76 repeat pending this AM

## 2019-10-23 NOTE — PROGRESS NOTES
Code Status: Level 1 - Full Code  POA:    POLST:      Reason for ICU admission:   Septic shock     Active problems:   Principal Problem:    Pneumonia involving right lung  Active Problems:    Septic shock ([address] )    Nausea & vomiting    Chronic pain  Resolved Problems:    RUQ abdominal pain    Hypokalemia      Consultants:   none    History of Present Illness:   "Dakota Cota is a 62 y o  female w/ pmhx of chronic pain who presents with nausea, vomiting and myalgia on 10/21  She reports having undergone a ISIDRO early in the day but later in the day became nauseated and started vomiting several times  She also began to have chills and myalgia  She was febrile on arrival to ED and CT chest showed concern for PNA  Admitted to floor but overnight became hypotensive but did not respond to IVF 30 cc/kg  She was transferred to  for pressor initiation but upn arrival BP's were above 90 sys "    Summary of clinical course:   Patient transferred to Step down given septic shock secondary to pneumonia  BP's improved after fluid administration, patient did not require vasopressors  Antibiotics broadened to cefepime/vanco/flagyl  Blood cultures have remained negative  Patient hemodynamically stable on room air  RUQ negative  CT abdomen/pelvis negative  Recent or scheduled procedures:   none    Outstanding/pending diagnostics:   procal  Blood cultures  Sputum culture         Mobilization Plan:   Ambulating with assistance     Nutrition Plan:   Regular diet     VTE Pharmacologic Prophylaxis: Heparin  VTE Mechanical Prophylaxis: sequential compression device    Discharge Plan:   Patient should be ready for discharge to home     Initial Physical Therapy Recommendations: na  Initial Occupational Therapy Recommendations: na  Initial /Plan: na    Home medications that are not reordered and reason why:   resumed      Spoke with Dr Malaika Armas regarding transfer  Please call [phone] with any questions or concerns  Portions of the record may have been created with voice recognition software  Occasional wrong word or "sound a like" substitutions may have occurred due to the inherent limitations of voice recognition software  Read the chart carefully and recognize, using context, where substitutions have occurred

## 2019-10-23 NOTE — ASSESSMENT & PLAN NOTE
· Patient admitted with nausea and vomiting  · RUQ US within normal limits  · CT abdomen/pelvis unremarkable   · Zofran PRN

## 2019-10-23 NOTE — PROGRESS NOTES
Vancomycin IV Pharmacy-to-Dose Consultation    Mega Hayes is a 62 y o  female who is currently receiving Vancomycin IV with management by the Pharmacy Consult service  Assessment/Plan:  The patient was reviewed  Renal function is stable and no signs or symptoms of nephrotoxicity and/or infusion reactions were documented in the chart  Based on todays assessment, continue current vancomycin (day # 2) dosing of 1000 mg IV q12h, with a plan for trough to be drawn at 1430 on 10/24/19  We will continue to follow the patients culture results and clinical progress daily      Naren Yanes, Pharmacist

## 2019-10-23 NOTE — PROGRESS NOTES
Progress Note - Selam Lang 1960, 62 y o  female MRN: [id number]    Unit/Bed#: ICU 04 Encounter: [id number]    Primary Care Provider: Dana Ritchie MD   Date and time admitted to hospital: 10/21/2019  9:29 PM        * Pneumonia involving right lung  Assessment & Plan  · Was admitted to [facility] service and placed on Rocephin, Azithro and Clinda  · Because of worsening BP's will increase to Cefepime, Vanco and Flagyl  · Sp02 93% on room air  · Strep pneumo/legionella negative   · Encourage I/S  · Pulm toilet  · Procal 9 76 repeat pending this AM    Septic shock ([address] )  Assessment & Plan  · Shock resolved  BP stable overnight  · Received multiple IVF boluses to include 30 cc/kg of crystalloid but did not seem to respond, BP's remain in the 80's  · Brought down to SD for closer observation  · On arrival to ICU by 273 systolic, will hold on pressors for now  · Start Levophed if BP <90  · Lactate WNL  · Pt states her BP usually runs in the mid to low 31'E systolic  RUQ abdominal pain-resolved as of 10/22/2019  Assessment & Plan  · No c/o right upper quadrant tenderness at present  · U/S WNL    Nausea & vomiting  Assessment & Plan  · Patient admitted with nausea and vomiting  · RUQ US within normal limits  · CT abdomen/pelvis unremarkable   · Zofran PRN     Chronic pain  Assessment & Plan  · Cont prn Oxycodone 5 mg Q4H prn      ----------------------------------------------------------------------------------------  HPI/24hr events: No acute events overnight  Patient intermittently complaining of nausea  Denies shortness of breath       Disposition: Transfer to Med-Surg   Code Status: Level 1 - Full Code  ---------------------------------------------------------------------------------------  SUBJECTIVE  "I feel nauseous and I have a headache"      ---------------------------------------------------------------------------------------  OBJECTIVE    Physical Exam   Constitutional: She is oriented to person, place, and time  She appears well-developed and well-nourished  No distress  HENT:   Head: Normocephalic and atraumatic  Mouth/Throat: No oropharyngeal exudate  Eyes: Pupils are equal, round, and reactive to light  EOM are normal  No scleral icterus  Neck: Normal range of motion  Neck supple  No JVD present  Cardiovascular: Normal rate, regular rhythm, normal heart sounds and intact distal pulses  Exam reveals no friction rub  No murmur heard  Pulmonary/Chest: Effort normal  No stridor  No respiratory distress  She has decreased breath sounds  She has no wheezes  She has no rales  Abdominal: Soft  Bowel sounds are normal  She exhibits no distension  There is no tenderness  There is no guarding  Musculoskeletal: Normal range of motion  She exhibits no edema  Lymphadenopathy:     She has no cervical adenopathy  Neurological: She is alert and oriented to person, place, and time  No cranial nerve deficit  Coordination normal    Skin: Skin is warm and dry  Capillary refill takes less than 2 seconds  She is not diaphoretic  No erythema  Psychiatric: She has a normal mood and affect  Her behavior is normal        Vitals   Vitals:    10/23/19 0200 10/23/19 0400 10/23/19 0700 10/23/19 0812   BP: 106/60 116/64  128/64   BP Location: Right arm Right arm  Right arm   Pulse: 93 97  93   Resp: 20 (!) 23  20   Temp:  98 2 °F (36 8 °C) 98 5 °F (36 9 °C) 98 4 °F (36 9 °C)   TempSrc:  Temporal  Temporal   SpO2: 93% 93%  92%   Weight:       Height:         Temp (24hrs), Av 4 °F (36 9 °C), Min:97 9 °F (36 6 °C), Max:99 1 °F (37 3 °C)  Current: Temperature: 98 4 °F (36 9 °C)      Invasive/non-invasive ventilation settings   Respiratory    Lab Data (Last 4 hours)    None         O2/Vent Data (Last 4 hours)    None                Height and Weights   Height: 5' 4" (162 6 cm)  IBW: 54 7 kg  Body mass index is 31 14 kg/m²    Weight (last 2 days)     Date/Time   Weight    10/22/19 1111   82 3 (181 44) 10/22/19 0600   76 4 (168 43)    10/22/19 0159   76 (167 55)    10/22/19 0149   76 (167 55)    10/21/19 2126   74 8 (165)                Intake and Output  I/O       10/21 0701 - 10/22 0700 10/22 0701 - 10/23 0700 10/23 0701 - 10/24 0700    IV Piggyback 1000 370     Total Intake(mL/kg) 1000 (13 1) 370 (4 5)     Urine (mL/kg/hr) 650 2000 (1) 400 (2 5)    Total Output 650 2000 400    Net +350 -1630 -400           Unmeasured Urine Occurrence  2 x           Nutrition       Diet Orders   (From admission, onward)             Start     Ordered    10/22/19 0940  Room Service  Once     Question:  Type of Service  Answer:  Room Service-Appropriate    10/22/19 5024    10/22/19 0149  Diet Regular; Regular House  Diet effective now     Question Answer Comment   Diet Type Regular    Regular Regular House    RD to adjust diet per protocol?  Yes        10/22/19 0148                Laboratory and Diagnostics:  Results from last 7 days   Lab Units 10/23/19  0533 10/22/19  0546 10/21/19  2158   WBC Thousand/uL 9 04 12 53* 7 59   HEMOGLOBIN g/dL 11 9 12 0 14 3   HEMATOCRIT % 37 4 38 7 45 4   PLATELETS Thousands/uL 172 181 199   NEUTROS PCT % 85* 89* 91*   MONOS PCT % 5 4 3*     Results from last 7 days   Lab Units 10/23/19  0533 10/22/19  0546 10/21/19  2158   SODIUM mmol/L 135* 140 141   POTASSIUM mmol/L 3 7 4 1 3 3*   CHLORIDE mmol/L 104 111* 105   CO2 mmol/L 24 24 30   ANION GAP mmol/L 7 5 6   BUN mg/dL 7 9 13   CREATININE mg/dL 0 67 0 80 0 81   CALCIUM mg/dL 8 0* 7 0* 8 5   GLUCOSE RANDOM mg/dL 134 119 101   ALT U/L  --  9* 6*   AST U/L  --  10 14   ALK PHOS U/L  --  65 90   ALBUMIN g/dL  --  2 3* 3 1*   TOTAL BILIRUBIN mg/dL  --  0 40 0 40     Results from last 7 days   Lab Units 10/23/19  0533   MAGNESIUM mg/dL 2 1      Results from last 7 days   Lab Units 10/21/19  2158   INR  1 01   PTT seconds 25          Results from last 7 days   Lab Units 10/22/19  0932 10/21/19  2158   LACTIC ACID mmol/L 1 7 1 5     ABG:    VBG:    Results from last 7 days   Lab Units 10/22/19  0932 10/21/19  2158   PROCALCITONIN ng/ml 9 76* 1 39*       Micro  Results from last 7 days   Lab Units 10/22/19  0302 10/21/19  2317   INFLUENZA B PCR   --  Not Detected   RSV PCR   --  Not Detected   LEGIONELLA URINARY ANTIGEN  Negative  --    STREP PNEUMONIAE ANTIGEN, URINE  Negative  --        EKG: sinus rhythm on telemetry   Imaging: I have personally reviewed pertinent reports  and I have personally reviewed pertinent films in PACS    Active Medications  Scheduled Meds:  Current Facility-Administered Medications:  acetaminophen 650 mg Oral Q6H PRN Darcia Begun, CRNP    cefepime 2,000 mg Intravenous Q12H Darcia Begun, CRNP Last Rate: Stopped (10/23/19 0345)   cyclobenzaprine 10 mg Oral TID PRN Darcia Begun, CRNP    diazepam 2 mg Oral Q6H PRN Darcia Begun, CRNP    heparin (porcine) 5,000 Units Subcutaneous [facility] Darcia Begun, CRNP    metroNIDAZOLE 500 mg Intravenous Q8H Darcia Begun, CRNP Last Rate: Stopped (10/22/19 2235)   ondansetron 4 mg Intravenous Q8H PRN Haroldine Font, CRNP    oxyCODONE 5 mg Oral Q4H PRN Darcia Begun, CRNP    potassium chloride 20 mEq Intravenous Once Haroldine Font, CRNP Last Rate: 20 mEq (10/23/19 0751)   vancomycin 15 mg/kg (Adjusted) Intravenous Q12H Darcia Begun, CRNP Last Rate: 1,000 mg (10/23/19 0401)     Continuous Infusions:     PRN Meds:     acetaminophen 650 mg Q6H PRN   cyclobenzaprine 10 mg TID PRN   diazepam 2 mg Q6H PRN   ondansetron 4 mg Q8H PRN   oxyCODONE 5 mg Q4H PRN       ---------------------------------------------------------------------------------------  Advance Directive and Living Will:      Power of :    POLST:    ---------------------------------------------------------------------------------------  Counseling / Coordination of Care  Total time spent today 32 minutes   Greater than 50% of total time was spent with the patient and / or family counseling and / or coordination of RADHA Smiley        Portions of the record may have been created with voice recognition software  Occasional wrong word or "sound a like" substitutions may have occurred due to the inherent limitations of voice recognition software    Read the chart carefully and recognize, using context, where substitutions have occurred

## 2019-10-23 NOTE — PROGRESS NOTES
The patient's Vancomycin therapy has been discontinued  Thank you for this consult; Pharmacy will sign off now

## 2019-10-23 NOTE — PLAN OF CARE
Problem: Potential for Falls  Goal: Patient will remain free of falls  Description  INTERVENTIONS:  - Assess patient frequently for physical needs  -  Identify cognitive and physical deficits and behaviors that affect risk of falls    -  [place] fall precautions as indicated by assessment   - Educate patient/family on patient safety including physical limitations  - Instruct patient to call for assistance with activity based on assessment  - Modify environment to reduce risk of injury  - Consider OT/PT consult to assist with strengthening/mobility  Outcome: Progressing     Problem: PAIN - ADULT  Goal: Verbalizes/displays adequate comfort level or baseline comfort level  Description  Interventions:  - Encourage patient to monitor pain and request assistance  - Assess pain using appropriate pain scale  - Administer analgesics based on type and severity of pain and evaluate response  - Implement non-pharmacological measures as appropriate and evaluate response  - Consider cultural and social influences on pain and pain management  - Notify physician/advanced practitioner if interventions unsuccessful or patient reports new pain  Outcome: Progressing     Problem: INFECTION - ADULT  Goal: Absence or prevention of progression during hospitalization  Description  INTERVENTIONS:  - Assess and monitor for signs and symptoms of infection  - Monitor lab/diagnostic results  - Monitor all insertion sites, i e  indwelling lines, tubes, and drains  - Monitor endotracheal if appropriate and nasal secretions for changes in amount and color  - [place] appropriate cooling/warming therapies per order  - Administer medications as ordered  - Instruct and encourage patient and family to use good hand hygiene technique  - Identify and instruct in appropriate isolation precautions for identified infection/condition  Outcome: Progressing     Problem: SAFETY ADULT  Goal: Patient will remain free of falls  Description  INTERVENTIONS:  - Assess patient frequently for physical needs  -  Identify cognitive and physical deficits and behaviors that affect risk of falls    -  [place] fall precautions as indicated by assessment   - Educate patient/family on patient safety including physical limitations  - Instruct patient to call for assistance with activity based on assessment  - Modify environment to reduce risk of injury  - Consider OT/PT consult to assist with strengthening/mobility  Outcome: Progressing  Goal: Maintain or return to baseline ADL function  Description  INTERVENTIONS:  -  Assess patient's ability to carry out ADLs; assess patient's baseline for ADL function and identify physical deficits which impact ability to perform ADLs (bathing, care of mouth/teeth, toileting, grooming, dressing, etc )  - Assess/evaluate cause of self-care deficits   - Assess range of motion  - Assess patient's mobility; develop plan if impaired  - Assess patient's need for assistive devices and provide as appropriate  - Encourage maximum independence but intervene and supervise when necessary  - Involve family in performance of ADLs  - Assess for home care needs following discharge   - Consider OT consult to assist with ADL evaluation and planning for discharge  - Provide patient education as appropriate  Outcome: Progressing  Goal: Maintain or return mobility status to optimal level  Description  INTERVENTIONS:  - Assess patient's baseline mobility status (ambulation, transfers, stairs, etc )    - Identify cognitive and physical deficits and behaviors that affect mobility  - Identify mobility aids required to assist with transfers and/or ambulation (gait belt, sit-to-stand, lift, walker, cane, etc )  - [place] fall precautions as indicated by assessment  - Record patient progress and toleration of activity level on Mobility SBAR; progress patient to next Phase/Stage  - Instruct patient to call for assistance with activity based on assessment  - Consider rehabilitation consult to assist with strengthening/weightbearing, etc   Outcome: Progressing     Problem: DISCHARGE PLANNING  Goal: Discharge to home or other facility with appropriate resources  Description  INTERVENTIONS:  - Identify barriers to discharge w/patient and caregiver  - Arrange for needed discharge resources and transportation as appropriate  - Identify discharge learning needs (meds, wound care, etc )  - Arrange for interpretive services to assist at discharge as needed  - Refer to Case Management Department for coordinating discharge planning if the patient needs post-hospital services based on physician/advanced practitioner order or complex needs related to functional status, cognitive ability, or social support system  Outcome: Progressing     Problem: Knowledge Deficit  Goal: Patient/family/caregiver demonstrates understanding of disease process, treatment plan, medications, and discharge instructions  Description  Complete learning assessment and assess knowledge base    Interventions:  - Provide teaching at level of understanding  - Provide teaching via preferred learning methods  Outcome: Progressing

## 2019-10-23 NOTE — ASSESSMENT & PLAN NOTE
· Shock resolved  BP stable overnight  · Received multiple IVF boluses to include 30 cc/kg of crystalloid but did not seem to respond, BP's remain in the 80's  · Brought down to SD for closer observation  · On arrival to ICU by 731 systolic, will hold on pressors for now  · Start Levophed if BP <90  · Lactate WNL  · Pt states her BP usually runs in the mid to low 78'E systolic

## 2019-10-24 VITALS
BODY MASS INDEX: 30.98 KG/M2 | SYSTOLIC BLOOD PRESSURE: 111 MMHG | DIASTOLIC BLOOD PRESSURE: 71 MMHG | TEMPERATURE: 98 F | HEART RATE: 100 BPM | HEIGHT: 64 IN | RESPIRATION RATE: 20 BRPM | WEIGHT: 181.44 LBS | OXYGEN SATURATION: 94 %

## 2019-10-24 PROBLEM — R65.20 SEVERE SEPSIS (HCC): Status: RESOLVED | Noted: 2019-10-22 | Resolved: 2019-10-24

## 2019-10-24 PROBLEM — A41.9 SEVERE SEPSIS (HCC): Status: RESOLVED | Noted: 2019-10-22 | Resolved: 2019-10-24

## 2019-10-24 PROBLEM — R65.20 SEVERE SEPSIS (HCC): Status: ACTIVE | Noted: 2019-10-22

## 2019-10-24 PROBLEM — R11.2 NAUSEA & VOMITING: Status: RESOLVED | Noted: 2019-10-23 | Resolved: 2019-10-24

## 2019-10-24 LAB
ANION GAP SERPL CALCULATED.3IONS-SCNC: 4 MMOL/L (ref 4–13)
BASOPHILS # BLD AUTO: 0.03 THOUSANDS/ΜL (ref 0–0.1)
BASOPHILS NFR BLD AUTO: 1 % (ref 0–1)
BUN SERPL-MCNC: 6 MG/DL (ref 5–25)
CALCIUM SERPL-MCNC: 8.1 MG/DL (ref 8.3–10.1)
CHLORIDE SERPL-SCNC: 108 MMOL/L (ref 100–108)
CO2 SERPL-SCNC: 29 MMOL/L (ref 21–32)
CREAT SERPL-MCNC: 0.76 MG/DL (ref 0.6–1.3)
EOSINOPHIL # BLD AUTO: 0.11 THOUSAND/ΜL (ref 0–0.61)
EOSINOPHIL NFR BLD AUTO: 2 % (ref 0–6)
ERYTHROCYTE [DISTWIDTH] IN BLOOD BY AUTOMATED COUNT: 12.6 % (ref 11.6–15.1)
GFR SERPL CREATININE-BSD FRML MDRD: 85 ML/MIN/1.73SQ M
GLUCOSE SERPL-MCNC: 107 MG/DL (ref 65–140)
HCT VFR BLD AUTO: 38.7 % (ref 34.8–46.1)
HGB BLD-MCNC: 12.2 G/DL (ref 11.5–15.4)
IMM GRANULOCYTES # BLD AUTO: 0.02 THOUSAND/UL (ref 0–0.2)
IMM GRANULOCYTES NFR BLD AUTO: 0 % (ref 0–2)
LYMPHOCYTES # BLD AUTO: 1.05 THOUSANDS/ΜL (ref 0.6–4.47)
LYMPHOCYTES NFR BLD AUTO: 19 % (ref 14–44)
MAGNESIUM SERPL-MCNC: 2 MG/DL (ref 1.6–2.6)
MCH RBC QN AUTO: 28.7 PG (ref 26.8–34.3)
MCHC RBC AUTO-ENTMCNC: 31.5 G/DL (ref 31.4–37.4)
MCV RBC AUTO: 91 FL (ref 82–98)
MONOCYTES # BLD AUTO: 0.38 THOUSAND/ΜL (ref 0.17–1.22)
MONOCYTES NFR BLD AUTO: 7 % (ref 4–12)
NEUTROPHILS # BLD AUTO: 3.94 THOUSANDS/ΜL (ref 1.85–7.62)
NEUTS SEG NFR BLD AUTO: 71 % (ref 43–75)
NRBC BLD AUTO-RTO: 0 /100 WBCS
PLATELET # BLD AUTO: 177 THOUSANDS/UL (ref 149–390)
PMV BLD AUTO: 9.2 FL (ref 8.9–12.7)
POTASSIUM SERPL-SCNC: 3.9 MMOL/L (ref 3.5–5.3)
PROCALCITONIN SERPL-MCNC: 4.44 NG/ML
RBC # BLD AUTO: 4.25 MILLION/UL (ref 3.81–5.12)
SODIUM SERPL-SCNC: 141 MMOL/L (ref 136–145)
WBC # BLD AUTO: 5.53 THOUSAND/UL (ref 4.31–10.16)

## 2019-10-24 PROCEDURE — 83735 ASSAY OF MAGNESIUM: CPT | Performed by: NURSE PRACTITIONER

## 2019-10-24 PROCEDURE — 80048 BASIC METABOLIC PNL TOTAL CA: CPT | Performed by: NURSE PRACTITIONER

## 2019-10-24 PROCEDURE — 85025 COMPLETE CBC W/AUTO DIFF WBC: CPT | Performed by: NURSE PRACTITIONER

## 2019-10-24 PROCEDURE — 84145 PROCALCITONIN (PCT): CPT | Performed by: NURSE PRACTITIONER

## 2019-10-24 PROCEDURE — 99239 HOSP IP/OBS DSCHRG MGMT >30: CPT | Performed by: INTERNAL MEDICINE

## 2019-10-24 RX ORDER — GUAIFENESIN/DEXTROMETHORPHAN 100-10MG/5
10 SYRUP ORAL EVERY 4 HOURS PRN
Status: DISCONTINUED | OUTPATIENT
Start: 2019-10-24 | End: 2019-10-24 | Stop reason: HOSPADM

## 2019-10-24 RX ORDER — GUAIFENESIN/DEXTROMETHORPHAN 100-10MG/5
10 SYRUP ORAL 3 TIMES DAILY PRN
Qty: 118 ML | Refills: 0 | Status: SHIPPED | OUTPATIENT
Start: 2019-10-24

## 2019-10-24 RX ORDER — CEFPODOXIME PROXETIL 200 MG/1
200 TABLET, FILM COATED ORAL 2 TIMES DAILY
Qty: 10 TABLET | Refills: 0 | Status: SHIPPED | OUTPATIENT
Start: 2019-10-24 | End: 2019-10-29

## 2019-10-24 RX ORDER — ACETAMINOPHEN 325 MG/1
650 TABLET ORAL EVERY 6 HOURS PRN
Qty: 30 TABLET | Refills: 0
Start: 2019-10-24

## 2019-10-24 RX ORDER — METRONIDAZOLE 500 MG/1
500 TABLET ORAL EVERY 8 HOURS SCHEDULED
Qty: 15 TABLET | Refills: 0 | Status: SHIPPED | OUTPATIENT
Start: 2019-10-24 | End: 2019-10-29

## 2019-10-24 RX ORDER — GUAIFENESIN 600 MG
600 TABLET, EXTENDED RELEASE 12 HR ORAL EVERY 12 HOURS SCHEDULED
Status: DISCONTINUED | OUTPATIENT
Start: 2019-10-24 | End: 2019-10-24 | Stop reason: HOSPADM

## 2019-10-24 RX ADMIN — METRONIDAZOLE 500 MG: 500 INJECTION, SOLUTION INTRAVENOUS at 06:14

## 2019-10-24 RX ADMIN — CEFEPIME HYDROCHLORIDE 2000 MG: 2 INJECTION, POWDER, FOR SOLUTION INTRAVENOUS at 02:58

## 2019-10-24 RX ADMIN — ACETAMINOPHEN 650 MG: 325 TABLET, FILM COATED ORAL at 08:22

## 2019-10-24 RX ADMIN — CYCLOBENZAPRINE HYDROCHLORIDE 10 MG: 10 TABLET, FILM COATED ORAL at 01:09

## 2019-10-24 RX ADMIN — HEPARIN SODIUM 5000 UNITS: 5000 INJECTION INTRAVENOUS; SUBCUTANEOUS at 06:15

## 2019-10-24 NOTE — PLAN OF CARE
Problem: Potential for Falls  Goal: Patient will remain free of falls  Description  INTERVENTIONS:  - Assess patient frequently for physical needs  -  Identify cognitive and physical deficits and behaviors that affect risk of falls    -  [place] fall precautions as indicated by assessment   - Educate patient/family on patient safety including physical limitations  - Instruct patient to call for assistance with activity based on assessment  - Modify environment to reduce risk of injury  - Consider OT/PT consult to assist with strengthening/mobility  Outcome: Progressing     Problem: PAIN - ADULT  Goal: Verbalizes/displays adequate comfort level or baseline comfort level  Description  Interventions:  - Encourage patient to monitor pain and request assistance  - Assess pain using appropriate pain scale  - Administer analgesics based on type and severity of pain and evaluate response  - Implement non-pharmacological measures as appropriate and evaluate response  - Consider cultural and social influences on pain and pain management  - Notify physician/advanced practitioner if interventions unsuccessful or patient reports new pain  Outcome: Progressing     Problem: INFECTION - ADULT  Goal: Absence or prevention of progression during hospitalization  Description  INTERVENTIONS:  - Assess and monitor for signs and symptoms of infection  - Monitor lab/diagnostic results  - Monitor all insertion sites, i e  indwelling lines, tubes, and drains  - Monitor endotracheal if appropriate and nasal secretions for changes in amount and color  - [place] appropriate cooling/warming therapies per order  - Administer medications as ordered  - Instruct and encourage patient and family to use good hand hygiene technique  - Identify and instruct in appropriate isolation precautions for identified infection/condition  Outcome: Progressing     Problem: SAFETY ADULT  Goal: Patient will remain free of falls  Description  INTERVENTIONS:  - Assess patient frequently for physical needs  -  Identify cognitive and physical deficits and behaviors that affect risk of falls    -  [place] fall precautions as indicated by assessment   - Educate patient/family on patient safety including physical limitations  - Instruct patient to call for assistance with activity based on assessment  - Modify environment to reduce risk of injury  - Consider OT/PT consult to assist with strengthening/mobility  Outcome: Progressing  Goal: Maintain or return to baseline ADL function  Description  INTERVENTIONS:  -  Assess patient's ability to carry out ADLs; assess patient's baseline for ADL function and identify physical deficits which impact ability to perform ADLs (bathing, care of mouth/teeth, toileting, grooming, dressing, etc )  - Assess/evaluate cause of self-care deficits   - Assess range of motion  - Assess patient's mobility; develop plan if impaired  - Assess patient's need for assistive devices and provide as appropriate  - Encourage maximum independence but intervene and supervise when necessary  - Involve family in performance of ADLs  - Assess for home care needs following discharge   - Consider OT consult to assist with ADL evaluation and planning for discharge  - Provide patient education as appropriate  Outcome: Progressing  Goal: Maintain or return mobility status to optimal level  Description  INTERVENTIONS:  - Assess patient's baseline mobility status (ambulation, transfers, stairs, etc )    - Identify cognitive and physical deficits and behaviors that affect mobility  - Identify mobility aids required to assist with transfers and/or ambulation (gait belt, sit-to-stand, lift, walker, cane, etc )  - [place] fall precautions as indicated by assessment  - Record patient progress and toleration of activity level on Mobility SBAR; progress patient to next Phase/Stage  - Instruct patient to call for assistance with activity based on assessment  - Consider rehabilitation consult to assist with strengthening/weightbearing, etc   Outcome: Progressing     Problem: DISCHARGE PLANNING  Goal: Discharge to home or other facility with appropriate resources  Description  INTERVENTIONS:  - Identify barriers to discharge w/patient and caregiver  - Arrange for needed discharge resources and transportation as appropriate  - Identify discharge learning needs (meds, wound care, etc )  - Arrange for interpretive services to assist at discharge as needed  - Refer to Case Management Department for coordinating discharge planning if the patient needs post-hospital services based on physician/advanced practitioner order or complex needs related to functional status, cognitive ability, or social support system  Outcome: Progressing     Problem: Knowledge Deficit  Goal: Patient/family/caregiver demonstrates understanding of disease process, treatment plan, medications, and discharge instructions  Description  Complete learning assessment and assess knowledge base    Interventions:  - Provide teaching at level of understanding  - Provide teaching via preferred learning methods  Outcome: Progressing

## 2019-10-24 NOTE — NURSING NOTE
Discharge instructions, medications and follow up reviewed with patient and her  at bedside  Both confirm understanding with no further questions  Home medication flexeril returned to patient from pharmacy  Patient and  are educated with confirmed understanding of scripts to be picked up at [facility]   Pt discharged to home via wheelchair with  as primary

## 2019-10-24 NOTE — DISCHARGE SUMMARY
Discharge Summary - [facility] Internal Medicine    Patient Information: Jordy Peralta 62 y o  female MRN: [id number]  Unit/Bed#: ICU 04 Encounter: [id number]    Discharging Physician / Practitioner: Piero Henson MD  PCP: Ebonie Vela MD  Admission Date: 10/21/2019  Discharge Date: 10/24/19    Reason for Admission: Post-op Problem (Patient statse that she had a ISIDRO done today by Dr Virgilio Harvey at [facility]     States that sh doesn't feel well after it  )      Discharge Diagnoses:     Principal Problem (Resolved):    Severe sepsis ([address] )  Active Problems:    Pneumonia involving right lung    Chronic pain  Resolved Problems:    RUQ abdominal pain    Hypokalemia    Nausea & vomiting        Pneumonia involving right lung  Assessment & Plan  Patient reported cough and right-sided chest pain over few days prior to hospitalization   Underwent ISIDRO day prior to presentation with subsequent nausea vomiting and likely aspiration  CT chest abdomen and pelvis revealed right lower lobe pneumonia  Suspected community-acquired plus/minus aspiration  Initially treated with azithromycin, ceftriaxone and clindamycin but remained hypotensive with worsening of leukocytosis  Blood cultures negative  Urine Legionella pneumococcal antigen, influenza PCR negative  MRSA surveillance negative  Unable to provide sputum culture as cough is significantly improved  Antimicrobial treatment was changed to vancomycin, cefepime and metronidazole  Remains afebrile, leukocytosis and Procalcitonin is trending down  Reports improvement in respiratory symptoms without any significant cough or shortness of breath at rest or with ambulation  Oxygenation is adequate at room air or with activity  Will transition to cefpodoxime and metronidazole to complete 7 days  Follow up with PMD after discharge  Repeat chest x-ray in 6-8 weeks        * Severe sepsis (HCC)-resolved as of 10/24/2019  Assessment & Plan  POA - as evidenced by fever, tachycardia and right lung pneumonia  lactic acid unremarkable  Remains hypotensive despite multiple IV fluid boluses including 30 cc/kg  Require transfer to step-down unit for close monitoring  Noted to have worsening of leukocytosis and elevated procalcitonin  Antibiotic treatment was changed as below  Blood pressure gradually stabilized, did not require pressors  Now blood pressure remained stable at baseline  Continue antimicrobial treatment as below            RUQ abdominal pain-resolved as of 10/22/2019  Assessment & Plan  Pt reports this has been ongoing for several days  At the time of presentation likely related to right lower lobe pneumonia  Patient history of nausea vomiting yesterday after ISIDRO, now improved  No right upper quadrant tenderness and remained pain-free  LFT remains stable  Ultrasound without any abnormalities  No further GI symptoms  Tolerating diet well    History of peptic ulcer disease  Assessment & Plan  Patient reports remote history of peptic ulcer disease with bleeding  History of hiatal hernia status post repair  Denies any bleeding at present, hemoglobin stable    Chronic pain  Assessment & Plan  Continue home regimen     Hypokalemia-resolved as of 10/22/2019  Assessment & Plan  Likely secondary to vomiting  Repeated, improved      Consultations During Hospital Stay:  IP CONSULT TO CASE MANAGEMENT    Procedures Performed:     · None    Significant Findings:     · Refer to hospital course and above listed diagnosis related plan for details    Imaging while in hospital:    Xr Chest 1 View Portable    Result Date: 10/22/2019  Narrative: CHEST INDICATION:   fever  COMPARISON: Subsequently obtained and reported CT of the chest  EXAM PERFORMED/VIEWS:  XR CHEST PORTABLE 1 image FINDINGS: Cardiomediastinal silhouette appears unremarkable  The pulmonary vessels are normal  Right lower lobe infiltrate  No pneumothorax or pleural effusion   Osseous structures appear within normal limits for patient age  Impression: Right lower lobe pneumonia  Workstation performed: [id number]     Ct Chest Abdomen Pelvis W Contrast    Result Date: 10/22/2019  Narrative: CT CHEST, ABDOMEN AND PELVIS WITH IV CONTRAST INDICATION:   Sepsis  COMPARISON:  None  TECHNIQUE: CT examination of the chest, abdomen and pelvis was performed  Axial, sagittal, and coronal 2D reformatted images were created from the source data and submitted for interpretation  Radiation dose length product (DLP) for this visit:  506 46 mGy-cm   This examination, like all CT scans performed in the [facility], was performed utilizing techniques to minimize radiation dose exposure, including the use of iterative  reconstruction and automated exposure control  IV Contrast:  100 mL of iohexol (OMNIPAQUE) Enteric Contrast: Enteric contrast was administered  FINDINGS: CHEST LUNGS:  The trachea and central bronchial tree is patent  Diffuse airspace opacities are seen throughout the lungs  Consolidation is seen within the right lower lobe  PLEURA:  Unremarkable  HEART/GREAT VESSELS:  Small pericardial effusion is seen  MEDIASTINUM AND RIKKI:  Unremarkable  CHEST WALL AND LOWER NECK:   Unremarkable  ABDOMEN LIVER/BILIARY TREE:  Unremarkable  GALLBLADDER:  No calcified gallstones  No pericholecystic inflammatory change  SPLEEN:  Unremarkable  PANCREAS:  Unremarkable  ADRENAL GLANDS:  Unremarkable  KIDNEYS/URETERS:  Unremarkable  No hydronephrosis  STOMACH AND BOWEL:  Thickening of the colonic wall at the hepatic flexure is seen  Large amount of fecal material within the colon is seen  APPENDIX:  Not visualized ABDOMINOPELVIC CAVITY:  No ascites or free intraperitoneal air  No lymphadenopathy  VESSELS:  Unremarkable for patient's age  PELVIS REPRODUCTIVE ORGANS:  Unremarkable for patient's age  URINARY BLADDER:  Unremarkable  ABDOMINAL WALL/INGUINAL REGIONS:  Unremarkable  OSSEOUS STRUCTURES:  Scoliosis of the spine  Impression: Diffuse airspace opacities within the lungs and consolidation in the right lower lobe  Findings likely represent pneumonia  Follow-up to resolution is recommended  Workstation performed: [id number]     Us Right Upper Quadrant    Result Date: 10/22/2019  Narrative: RIGHT UPPER QUADRANT ULTRASOUND INDICATION:     RUQ pain, nausea/vomiting  COMPARISON:  CT scan 10/21/2019  TECHNIQUE:   Real-time ultrasound of the right upper quadrant was performed with a curvilinear transducer with both volumetric sweeps and still imaging techniques  FINDINGS: PANCREAS:  Visualized portions of the pancreas are within normal limits  AORTA AND IVC:  Obscured by overlying bowel gas  LIVER: Size:  Within normal range  The liver measures 14 cm in the midclavicular line  Contour:  Surface contour is smooth  Parenchyma:  Echogenicity and echotexture are within normal limits  No evidence of suspicious mass  Limited imaging of the main portal vein shows it to be patent and hepatopetal   BILIARY: The gallbladder is normal in caliber  No wall thickening or pericholecystic fluid  No stones or sludge identified  No sonographic Hampton's sign  No intrahepatic biliary dilatation  CBD measures 2 mm  No choledocholithiasis  KIDNEY: Right kidney measures 9 0 cm  Within normal limits  ASCITES:   None  Impression: Normal  Workstation performed: [id number]       Incidental Findings:   · None    Test Results Pending at Discharge (will require follow up):   · As per After Visit Summary     Outpatient Tests Requested:  · Repeat x-ray chest    Complications:  Refer to hospital course and above listed diagnosis related plan, if any    Hospital Course:     Rodo Fay is a 62 y o  female patient with history of chronic pain, status post ISIDRO day prior who originally presented to the hospital on 10/21/2019 due to fever myalgia and nausea vomiting after the procedure    Patient reported that she has not been feeling well prior to the procedure for 3-4 weeks with right-sided chest pain cough and congestion  She underwent ISIDRO which was unremarkable without any complication but subsequently she had nausea and possible aspiration  Patient reported subsequent chills, myalgia and malaise and subsequently presented to ED  Patient was noted to be febrile in ED with T-max of 103 4 degree F and patient was subsequently admitted for further evaluation and workup  Please see above list of diagnoses and related plan for additional information  Condition at Discharge: stable     Discharge Day Visit / Exam:     Subjective:  Denies shortness of breath  Cough is significantly improved mild dry cough  Denies any chest pain or right-sided pain  Denies fever chills  Denies dizziness or palpitation  Denies any GI or  symptoms  Ambulating in room and hallway without any limiting symptoms    Vitals: Blood Pressure: 111/71 (10/24/19 1207)  Pulse: 100 (10/24/19 1207)  Temperature: 98 °F (36 7 °C) (10/24/19 1200)  Temp Source: Tympanic (10/24/19 1200)  Respirations: 20 (10/24/19 1207)  Height: 5' 4" (162 6 cm) (10/22/19 0159)  Weight - Scale: 82 3 kg (181 lb 7 oz) (10/22/19 1111)  SpO2: 94 % (10/24/19 1207) on room air  Exam:   Physical Exam   Constitutional: She is oriented to person, place, and time  She appears well-developed  No distress  HENT:   Head: Normocephalic and atraumatic  Nose: Nose normal    Eyes: Pupils are equal, round, and reactive to light  Conjunctivae are normal    Neck: Normal range of motion  Neck supple  Cardiovascular: Normal rate, regular rhythm and normal heart sounds  Pulmonary/Chest: Effort normal and breath sounds normal  No respiratory distress  She has no wheezes  She has no rales  Abdominal: Soft  Bowel sounds are normal  She exhibits no distension  There is no tenderness  There is no rebound and no guarding  Musculoskeletal: Normal range of motion  She exhibits no edema     Neurological: She is alert and oriented to person, place, and time  No cranial nerve deficit  Skin: Skin is warm and dry  No rash noted  Psychiatric: She has a normal mood and affect  Discharge instructions/Information to patient and family:(Discharge Medications and Follow up):   See after visit summary for information provided to patient and family  Provisions for Follow-Up Care:  See after visit summary for information related to follow-up care and any pertinent home health orders  Disposition: Home    Planned Readmission:  No     Discharge Statement:  I spent 45 minutes discharging the patient  This time was spent on the day of discharge  I had direct contact with the patient on the day of discharge  Greater than 50% of the total time was spent examining patient, answering all patient questions, arranging and discussing plan of care with patient as well as directly providing post-discharge instructions  Additional time then spent on discharge activities  Discharge Medications:  See after visit summary for reconciled discharge medications provided to patient and family  ** Please Note: "This note has been constructed using a voice recognition system  Therefore there may be syntax, spelling, and/or grammatical errors   Please call if you have any questions  "**

## 2019-10-27 LAB
BACTERIA BLD CULT: NORMAL
BACTERIA BLD CULT: NORMAL

## 2019-10-30 NOTE — UTILIZATION REVIEW
Notification of Discharge  This is a Notification of Discharge from our facility [address]  Please be advised that this patient has been discharge from our facility  Below you will find the admission and discharge date and time including the patients disposition  PRESENTATION DATE: 10/21/2019  9:29 PM  OBS ADMISSION DATE:   IP ADMISSION DATE: 10/22/19 1006   DISCHARGE DATE: 10/24/2019  2:15 PM  DISPOSITION: Home/Self Care Home/Self Care   Admission Orders listed below:  Admission Orders (From admission, onward)     Ordered        10/22/19 1006  Inpatient Admission  Once         10/22/19 0036  Place in Observation (expected length of stay for this patient is less than two midnights)  Once                   Please contact the UR Department if additional information is required to close this patient's authorization/case  Network Utilization Review Department  [email or web address]  Main: [phone]  ATTENTION: Please call with any questions or concerns to [phone] and carefully listen to the prompts so that you are directed to the right person  All voicemails are confidential   Jennifer Jarquin all requests for admission clinical reviews, approved or denied determinations and any other requests to dedicated fax number below belonging to the campus where the patient is receiving treatment    FACILITY NAME UR FAX NUMBER   ADMISSION DENIALS (Administrative/Medical Necessity) [address] [facility] (Maternity/NICU/Pediatrics) [phone]   [facility] [address] [place] [address] [address] [facility] [phone]   [facility] [address] [facility] [phone]   Herbert Mcgill [address] [phone]

## 2019-12-07 ENCOUNTER — HOSPITAL ENCOUNTER (OUTPATIENT)
Dept: RADIOLOGY | Facility: HOSPITAL | Age: 59
Discharge: HOME | End: 2019-12-07
Payer: COMMERCIAL

## 2019-12-07 ENCOUNTER — TRANSCRIBE ORDERS (OUTPATIENT)
Dept: ADMINISTRATIVE | Facility: HOSPITAL | Age: 59
End: 2019-12-07

## 2019-12-07 DIAGNOSIS — J18.9 UNRESOLVED PNEUMONIA: Primary | ICD-10-CM

## 2019-12-07 PROCEDURE — 71046 X-RAY EXAM CHEST 2 VIEWS: CPT

## 2021-03-30 DIAGNOSIS — Z23 ENCOUNTER FOR IMMUNIZATION: ICD-10-CM

## 2021-04-05 ENCOUNTER — IMMUNIZATIONS (OUTPATIENT)
Dept: FAMILY MEDICINE CLINIC | Facility: HOSPITAL | Age: 61
End: 2021-04-05

## 2021-04-05 DIAGNOSIS — Z23 ENCOUNTER FOR IMMUNIZATION: Primary | ICD-10-CM

## 2021-04-05 PROCEDURE — 91300 SARS-COV-2 / COVID-19 MRNA VACCINE (PFIZER-BIONTECH) 30 MCG: CPT

## 2021-04-05 PROCEDURE — 0001A SARS-COV-2 / COVID-19 MRNA VACCINE (PFIZER-BIONTECH) 30 MCG: CPT

## 2021-04-28 ENCOUNTER — IMMUNIZATIONS (OUTPATIENT)
Dept: FAMILY MEDICINE CLINIC | Facility: HOSPITAL | Age: 61
End: 2021-04-28

## 2021-04-28 DIAGNOSIS — Z23 ENCOUNTER FOR IMMUNIZATION: Primary | ICD-10-CM

## 2021-04-28 PROCEDURE — 0002A SARS-COV-2 / COVID-19 MRNA VACCINE (PFIZER-BIONTECH) 30 MCG: CPT

## 2021-04-28 PROCEDURE — 91300 SARS-COV-2 / COVID-19 MRNA VACCINE (PFIZER-BIONTECH) 30 MCG: CPT

## 2022-01-01 ENCOUNTER — APPOINTMENT (EMERGENCY)
Dept: CT IMAGING | Facility: HOSPITAL | Age: 62
End: 2022-01-01
Payer: COMMERCIAL

## 2022-01-01 ENCOUNTER — APPOINTMENT (EMERGENCY)
Dept: RADIOLOGY | Facility: HOSPITAL | Age: 62
End: 2022-01-01
Payer: COMMERCIAL

## 2022-01-01 ENCOUNTER — HOSPITAL ENCOUNTER (EMERGENCY)
Facility: HOSPITAL | Age: 62
Discharge: HOME | End: 2022-01-01
Attending: EMERGENCY MEDICINE
Payer: COMMERCIAL

## 2022-01-01 VITALS
TEMPERATURE: 98.7 F | HEIGHT: 64 IN | HEART RATE: 81 BPM | DIASTOLIC BLOOD PRESSURE: 68 MMHG | WEIGHT: 171.52 LBS | RESPIRATION RATE: 18 BRPM | OXYGEN SATURATION: 95 % | BODY MASS INDEX: 29.28 KG/M2 | SYSTOLIC BLOOD PRESSURE: 128 MMHG

## 2022-01-01 DIAGNOSIS — M25.562 ACUTE PAIN OF LEFT KNEE: ICD-10-CM

## 2022-01-01 DIAGNOSIS — M54.2 NECK PAIN, ACUTE: ICD-10-CM

## 2022-01-01 DIAGNOSIS — M25.512 ACUTE PAIN OF LEFT SHOULDER: ICD-10-CM

## 2022-01-01 DIAGNOSIS — M25.552 ACUTE PAIN OF LEFT HIP: ICD-10-CM

## 2022-01-01 DIAGNOSIS — S09.90XA INJURY OF HEAD, INITIAL ENCOUNTER: ICD-10-CM
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
## 2022-01-01 PROCEDURE — 72125 CT NECK SPINE W/O DYE: CPT

## 2022-01-01 PROCEDURE — 73030 X-RAY EXAM OF SHOULDER: CPT

## 2022-01-01 PROCEDURE — 71045 X-RAY EXAM CHEST 1 VIEW: CPT

## 2022-01-01 PROCEDURE — 73502 X-RAY EXAM HIP UNI 2-3 VIEWS: CPT

## 2022-01-01 PROCEDURE — 96375 TX/PRO/DX INJ NEW DRUG ADDON: CPT

## 2022-01-01 PROCEDURE — 99284 EMERGENCY DEPT VISIT MOD MDM: CPT

## 2022-01-01 PROCEDURE — 96374 THER/PROPH/DIAG INJ IV PUSH: CPT

## 2022-01-01 PROCEDURE — 70450 CT HEAD/BRAIN W/O DYE: CPT

## 2022-01-01 PROCEDURE — 99285 EMERGENCY DEPT VISIT HI MDM: CPT | Performed by: EMERGENCY MEDICINE

## 2022-01-01 PROCEDURE — 73564 X-RAY EXAM KNEE 4 OR MORE: CPT

## 2022-01-01 RX ORDER — FENTANYL CITRATE 50 UG/ML
50 INJECTION, SOLUTION INTRAMUSCULAR; INTRAVENOUS ONCE
Status: COMPLETED | OUTPATIENT
Start: 2022-01-01 | End: 2022-01-01

## 2022-01-01 RX ORDER — ONDANSETRON 2 MG/ML
4 INJECTION INTRAMUSCULAR; INTRAVENOUS ONCE
Status: COMPLETED | OUTPATIENT
Start: 2022-01-01 | End: 2022-01-01

## 2022-01-01 RX ADMIN — ONDANSETRON 4 MG: 2 INJECTION INTRAMUSCULAR; INTRAVENOUS at 17:52

## 2022-01-01 RX ADMIN — FENTANYL CITRATE 50 MCG: 50 INJECTION INTRAMUSCULAR; INTRAVENOUS at 17:53

## 2022-01-01 NOTE — ED PROVIDER NOTES
History  Chief Complaint   Patient presents with    Fall     Pt presents via EMS, coming from home  Pt reports slipping and falling while getting in the shower  +headstrike +loc -thinners  A&O x4  c/o of headache, Left shoulder, hip and knee pain  Ms Sosa Gonzales is a 59 yof presenting for headache, neck pain, and left sided shoulder, hip, and knee pain s/p mechanical slip and fall with headstrike and questionable LOC, unable to differentiate which hurts worst   Initially states she did not recall hitting head, blacked out, later recanted and states she remembers falling and headstrike  Has left sided temporal headache and nausea  States she was able to walk immediately after fall, is able to move all extremities but moving left shoulder and left knee cause significant pain  Denies acute vision changes, lightheadedness, dizziness, chest pain, shortness of breath, abdominal pain, vomiting, saddle anesthesia, urinary retention, fecal incontinence, or numbness or tingling of face or any extremity  Had prior back surgery for stabilization of scoliosis, no other surgeries, no previous injuries, no medical problems, not on any medications, does not ever take aspirin  Prior to Admission Medications   Prescriptions Last Dose Informant Patient Reported?  Taking?   acetaminophen (TYLENOL) 325 mg tablet   No No   Sig: Take 2 tablets (650 mg total) by mouth every 6 (six) hours as needed for mild pain, headaches or fever   cyclobenzaprine (FLEXERIL) 10 mg tablet   Yes No   Sig: Take 10 mg by mouth 3 (three) times a day as needed for muscle spasms   dextromethorphan-guaiFENesin (ROBITUSSIN DM)  mg/5 mL syrup   No No   Sig: Take 10 mL by mouth 3 (three) times a day as needed for cough   diazepam (VALIUM) 2 mg tablet   Yes No   Sig: Take 2 mg by mouth every 6 (six) hours as needed for anxiety   oxyCODONE-acetaminophen (PERCOCET) 5-325 mg per tablet   Yes No   Sig: Take 1 tablet by mouth every 4 (four) hours as needed for moderate pain      Facility-Administered Medications: None       Past Medical History:   Diagnosis Date    Arthritis     Disease of thyroid gland     History of transfusion        Past Surgical History:   Procedure Laterality Date    APPENDECTOMY      HERNIA REPAIR      OOPHORECTOMY Right        History reviewed  No pertinent family history  I have reviewed and agree with the history as documented  E-Cigarette/Vaping     E-Cigarette/Vaping Substances     Social History     Tobacco Use    Smoking status: Former Smoker     Quit date:      Years since quittin 0    Smokeless tobacco: Never Used   Substance Use Topics    Alcohol use: Never     Alcohol/week: 0 0 standard drinks    Drug use: Never        Review of Systems   Constitutional: Negative  Negative for fatigue  HENT: Positive for facial swelling  Negative for ear pain, hearing loss, trouble swallowing and voice change  Left temple swollen  Eyes: Negative  Negative for photophobia, pain and visual disturbance  Respiratory: Negative  Negative for cough, chest tightness and shortness of breath  Cardiovascular: Negative  Negative for chest pain, palpitations and leg swelling  Gastrointestinal: Positive for nausea  Negative for abdominal distention, abdominal pain and vomiting  Genitourinary: Negative  Negative for difficulty urinating, dysuria and hematuria  Musculoskeletal: Positive for arthralgias, back pain, myalgias and neck pain  Negative for gait problem, joint swelling and neck stiffness  Skin: Negative  Negative for color change, pallor, rash and wound  Allergic/Immunologic: Negative  Neurological: Positive for headaches  Negative for dizziness, syncope, speech difficulty, weakness, light-headedness and numbness  Hematological: Negative  Does not bruise/bleed easily  Psychiatric/Behavioral: Negative  Negative for confusion     All other systems reviewed and are negative  Physical Exam  ED Triage Vitals [01/01/22 1652]   Temperature Pulse Respirations Blood Pressure SpO2   98 7 °F (37 1 °C) 81 18 128/68 95 %      Temp Source Heart Rate Source Patient Position - Orthostatic VS BP Location FiO2 (%)   Oral Monitor Lying Left arm --      Pain Score       6             Orthostatic Vital Signs  Vitals:    01/01/22 1652   BP: 128/68   Pulse: 81   Patient Position - Orthostatic VS: Lying       Physical Exam  Vitals and nursing note reviewed  Constitutional:       General: She is not in acute distress  Appearance: Normal appearance  She is not ill-appearing or diaphoretic  HENT:      Head: Normocephalic  Contusion present  No raccoon eyes, Wick's sign, abrasion, right periorbital erythema or left periorbital erythema  Jaw: There is normal jaw occlusion  Comments: Left temporal contusion with hematoma without loss of skin integrity, no bony deformity appreciated  Right Ear: External ear normal       Left Ear: External ear normal       Nose: Nose normal       Mouth/Throat:      Mouth: Mucous membranes are moist       Pharynx: Oropharynx is clear  Eyes:      General: No scleral icterus  Right eye: No discharge  Left eye: No discharge  Extraocular Movements: Extraocular movements intact  Conjunctiva/sclera: Conjunctivae normal       Pupils: Pupils are equal, round, and reactive to light  Cardiovascular:      Rate and Rhythm: Normal rate and regular rhythm  Pulses: Normal pulses  Heart sounds: Normal heart sounds  No murmur heard  No friction rub  No gallop  Pulmonary:      Effort: Pulmonary effort is normal  No respiratory distress  Breath sounds: Normal breath sounds  Abdominal:      General: Abdomen is flat  Bowel sounds are normal  There is no distension  Palpations: Abdomen is soft  Tenderness: There is no abdominal tenderness  There is no guarding or rebound     Musculoskeletal:         General: Tenderness present  No deformity  Right shoulder: Normal       Left shoulder: Tenderness present  No swelling, deformity, laceration or bony tenderness  Decreased range of motion  Normal strength  Normal pulse  Right upper arm: Normal       Left upper arm: Tenderness present  No swelling, deformity, lacerations or bony tenderness  Right elbow: Normal       Left elbow: Normal  Normal range of motion  Right forearm: Normal       Left forearm: Normal       Right wrist: Normal       Left wrist: Normal  Normal range of motion  Normal pulse  Right hand: Normal       Left hand: Normal       Cervical back: Normal range of motion and neck supple  Tenderness and bony tenderness present  No swelling, deformity or rigidity  Thoracic back: No bony tenderness  Scoliosis present  Lumbar back: No bony tenderness  Scoliosis present  Right hip: Normal       Left hip: Tenderness present  No deformity or bony tenderness  Normal range of motion  Right upper leg: Normal       Left upper leg: Tenderness present  No swelling, edema, deformity or bony tenderness  Right knee: Normal       Left knee: Effusion present  No swelling, deformity, erythema, ecchymosis, lacerations, bony tenderness or crepitus  Normal range of motion  Tenderness present over the lateral joint line  No medial joint line tenderness  Normal alignment  Right lower leg: Normal  No swelling  No edema  Left lower leg: Normal  No swelling  No edema  Right ankle: Normal  No tenderness  Normal range of motion  Left ankle: Normal  No tenderness  Normal range of motion  Right foot: Normal  Normal range of motion  No tenderness  Left foot: Normal  Normal range of motion  No tenderness  Comments: Midline tenderness to palpation of C3-C6  Lymphadenopathy:      Cervical: No cervical adenopathy  Skin:     General: Skin is warm and dry        Capillary Refill: Capillary refill takes less than 2 seconds  Coloration: Skin is not pale  Findings: No rash  Neurological:      General: No focal deficit present  Mental Status: She is alert and oriented to person, place, and time  Motor: No weakness  Psychiatric:         Mood and Affect: Mood normal          Behavior: Behavior normal          ED Medications  Medications   ondansetron (ZOFRAN) injection 4 mg (4 mg Intravenous Given 1/1/22 1752)   fentanyl citrate (PF) 100 MCG/2ML 50 mcg (50 mcg Intravenous Given 1/1/22 1753)       Diagnostic Studies  Results Reviewed     None                 CT cervical spine without contrast   Final Result by Mary Celeste MD (01/01 1852)      No cervical spine fracture or traumatic malalignment  Severe multilevel disc degeneration without critical central stenosis  Workstation performed: [id number]         CT head without contrast   Final Result by Mary Celeste MD (01/01 1855)      No acute intracranial abnormality  Workstation performed: [id number]         XR shoulder 2+ views LEFT    (Results Pending)   XR hip/pelv 2-3 vws left    (Results Pending)   XR knee 4+ views left injury    (Results Pending)   XR chest 1 view    (Results Pending)         Procedures  Procedures      ED Course                                       MDM  Number of Diagnoses or Management Options  Acute pain of left hip  Acute pain of left knee  Acute pain of left shoulder  Fall, initial encounter  Injury of head, initial encounter  Neck pain, acute  Diagnosis management comments: Ms Esperanza Sun is a 59 yof presenting with head, neck, and left sided shoulder and leg pain s/p mechanical fall with headstrike, questionable LOC, not on thinners  ROS as above  Pt is well appearing, airway intact, equal bilateral breath sounds  Does have small hematoma over the left temporal region    Tenderness to palpation from C3-C6 initially endorses tenderness to palpation of T10 however this was not reproducible on subsequent examination(prior to pain medications)  Also endorses left shoulder, hip, femur, and knee tenderness without any obvious deformity, is able to range all 4 extremities despite increased pain when doing so  CT head: WNL  CT neck: Chronic degenerative changes, no acute abnormalities  CXR: Unremarkable  Left shoulder xray:No obvious injuries  Left hip xray:No obvious injuries  Left knee xray:No obvious injuries  Given fentanyl, zofran with significant relief of pain, now 2/10 in all locations, complete relief of nausea  C spine cleared post imaging, no tenderness noted, has complete ROM in all directions without tenderness  Able to ambulate without difficulty prior to discharge  To take motrin, tylenol, apply ice as needed for pain, follow up with PCP for continued pain  Strict return precautions given  Stable on discharge  Amount and/or Complexity of Data Reviewed  Tests in the radiology section of CPT®: ordered and reviewed  Review and summarize past medical records: yes  Discuss the patient with other providers: yes  Independent visualization of images, tracings, or specimens: yes        Disposition  Final diagnoses:   Fall, initial encounter   Injury of head, initial encounter   Neck pain, acute   Acute pain of left shoulder   Acute pain of left hip   Acute pain of left knee     Time reflects when diagnosis was documented in both MDM as applicable and the Disposition within this note     Time User Action Codes Description Comment    1/1/2022  7:26 PM Eyvonne Angel Add [facility]  JWFR] Fall, initial encounter     1/1/2022  7:26 PM Eyvonne Angel Add [M60 57YU] Injury of head, initial encounter     1/1/2022  7:26 PM Eyvonne Angel Add [M54 2] Neck pain, acute     1/1/2022  7:26 PM Eyvonne Angel Add [M25 512] Acute pain of left shoulder     1/1/2022  7:27 PM Eyvonne Angel Add [M25 552] Acute pain of left hip     1/1/2022  7:27 PM Eyvonne Angel Add [M25 562] Acute pain of left knee       ED Disposition     ED Disposition Condition Date/Time Comment    Discharge Stable Sat Jan 1, 2022  7:28 PM Sarai Score discharge to home/self care  Follow-up Information     Follow up With Specialties Details Why Contact Info    Humberto Savage MD Family Medicine  If symptoms worsen [phone]            Patient's Medications   Discharge Prescriptions    No medications on file     No discharge procedures on file  PDMP Review     None           ED Provider  Attending physically available and evaluated Sarai Score  I managed the patient along with the ED Attending      Electronically Signed by         Josephine Zelaya DO  01/01/22 4222

## 2022-01-02 NOTE — ED ATTENDING ATTESTATION
1/1/2022  IMax DO, saw and evaluated the patient  I have discussed the patient with the resident/non-physician practitioner and agree with the resident's/non-physician practitioner's findings, Plan of Care, and MDM as documented in the resident's/non-physician practitioner's note, except where noted  All available labs and Radiology studies were reviewed  I was present for key portions of any procedure(s) performed by the resident/non-physician practitioner and I was immediately available to provide assistance  At this point I agree with the current assessment done in the Emergency Department  I have conducted an independent evaluation of this patient a history and physical is as follows:          75-year-old female, mechanical fall, questionable loss of consciousness, did strike her head, complaining of headache and neck pain  , also complaining of left hip and left knee pain  , called for her  after the fall, questionable loss of consciousness she somewhat remembers falling down to the ground somewhat remembers hitting her head,      Physical exam mild tenderness of the left knee left hip, mild midline tenderness of the cervical spine, no external signs of trauma to the head  CT and x-rays unremarkable  , patient able to ambulate without difficulty  Patient discharged          ED Course         Critical Care Time  Procedures

## 2022-01-02 NOTE — DISCHARGE INSTRUCTIONS
Please take motrin or tylenol as needed for pain  Applying ice or heat to painful areas might also provide pain relief

## 2022-07-02 ENCOUNTER — HOSPITAL ENCOUNTER (EMERGENCY)
Facility: HOSPITAL | Age: 62
Discharge: HOME | End: 2022-07-02
Attending: EMERGENCY MEDICINE | Admitting: EMERGENCY MEDICINE
Payer: COMMERCIAL

## 2022-07-02 ENCOUNTER — APPOINTMENT (EMERGENCY)
Dept: RADIOLOGY | Facility: HOSPITAL | Age: 62
End: 2022-07-02
Payer: COMMERCIAL

## 2022-07-02 ENCOUNTER — APPOINTMENT (EMERGENCY)
Dept: RADIOLOGY | Facility: HOSPITAL | Age: 62
End: 2022-07-02
Attending: EMERGENCY MEDICINE
Payer: COMMERCIAL

## 2022-07-02 VITALS
RESPIRATION RATE: 18 BRPM | DIASTOLIC BLOOD PRESSURE: 68 MMHG | OXYGEN SATURATION: 97 % | SYSTOLIC BLOOD PRESSURE: 108 MMHG | TEMPERATURE: 98.6 F | HEART RATE: 92 BPM

## 2022-07-02 DIAGNOSIS — L03.90 CELLULITIS: Primary | ICD-10-CM

## 2022-07-02 LAB
ALBUMIN SERPL BCP-MCNC: 3.2 G/DL (ref 3.5–5)
ALP SERPL-CCNC: 80 U/L (ref 46–116)
ALT SERPL W P-5'-P-CCNC: 19 U/L (ref 12–78)
ANION GAP SERPL CALCULATED.3IONS-SCNC: 14 MMOL/L (ref 4–13)
AST SERPL W P-5'-P-CCNC: 16 U/L (ref 5–45)
BASOPHILS # BLD AUTO: 0.04 THOUSANDS/ΜL (ref 0–0.1)
BASOPHILS NFR BLD AUTO: 0 % (ref 0–1)
BILIRUB SERPL-MCNC: 0.68 MG/DL (ref 0.2–1)
BUN SERPL-MCNC: 14 MG/DL (ref 5–25)
CALCIUM ALBUM COR SERPL-MCNC: 9.5 MG/DL (ref 8.3–10.1)
CALCIUM SERPL-MCNC: 8.9 MG/DL (ref 8.3–10.1)
CHLORIDE SERPL-SCNC: 103 MMOL/L (ref 100–108)
CK SERPL-CCNC: 36 U/L (ref 26–192)
CO2 SERPL-SCNC: 22 MMOL/L (ref 21–32)
CREAT SERPL-MCNC: 0.96 MG/DL (ref 0.6–1.3)
EOSINOPHIL # BLD AUTO: 0.01 THOUSAND/ΜL (ref 0–0.61)
EOSINOPHIL NFR BLD AUTO: 0 % (ref 0–6)
ERYTHROCYTE [DISTWIDTH] IN BLOOD BY AUTOMATED COUNT: 13.2 % (ref 11.6–15.1)
GFR SERPL CREATININE-BSD FRML MDRD: 63 ML/MIN/1.73SQ M
GLUCOSE SERPL-MCNC: 108 MG/DL (ref 65–140)
HCT VFR BLD AUTO: 44.7 % (ref 34.8–46.1)
HGB BLD-MCNC: 14.4 G/DL (ref 11.5–15.4)
IMM GRANULOCYTES # BLD AUTO: 0.05 THOUSAND/UL (ref 0–0.2)
IMM GRANULOCYTES NFR BLD AUTO: 0 % (ref 0–2)
LYMPHOCYTES # BLD AUTO: 1.05 THOUSANDS/ΜL (ref 0.6–4.47)
LYMPHOCYTES NFR BLD AUTO: 9 % (ref 14–44)
MAGNESIUM SERPL-MCNC: 2.3 MG/DL (ref 1.6–2.6)
MCH RBC QN AUTO: 28.1 PG (ref 26.8–34.3)
MCHC RBC AUTO-ENTMCNC: 32.2 G/DL (ref 31.4–37.4)
MCV RBC AUTO: 87 FL (ref 82–98)
MONOCYTES # BLD AUTO: 0.63 THOUSAND/ΜL (ref 0.17–1.22)
MONOCYTES NFR BLD AUTO: 6 % (ref 4–12)
NEUTROPHILS # BLD AUTO: 9.38 THOUSANDS/ΜL (ref 1.85–7.62)
NEUTS SEG NFR BLD AUTO: 85 % (ref 43–75)
NRBC BLD AUTO-RTO: 0 /100 WBCS
PLATELET # BLD AUTO: 214 THOUSANDS/UL (ref 149–390)
PMV BLD AUTO: 9.4 FL (ref 8.9–12.7)
POTASSIUM SERPL-SCNC: 3.7 MMOL/L (ref 3.5–5.3)
PROT SERPL-MCNC: 7.2 G/DL (ref 6.4–8.2)
RBC # BLD AUTO: 5.13 MILLION/UL (ref 3.81–5.12)
SODIUM SERPL-SCNC: 139 MMOL/L (ref 136–145)
WBC # BLD AUTO: 11.16 THOUSAND/UL (ref 4.31–10.16)

## 2022-07-02 PROCEDURE — 80053 COMPREHEN METABOLIC PANEL: CPT | Performed by: EMERGENCY MEDICINE

## 2022-07-02 PROCEDURE — 99285 EMERGENCY DEPT VISIT HI MDM: CPT | Performed by: EMERGENCY MEDICINE

## 2022-07-02 PROCEDURE — 36415 COLL VENOUS BLD VENIPUNCTURE: CPT | Performed by: EMERGENCY MEDICINE

## 2022-07-02 PROCEDURE — 93971 EXTREMITY STUDY: CPT

## 2022-07-02 PROCEDURE — G1004 CDSM NDSC: HCPCS

## 2022-07-02 PROCEDURE — 96375 TX/PRO/DX INJ NEW DRUG ADDON: CPT

## 2022-07-02 PROCEDURE — 85025 COMPLETE CBC W/AUTO DIFF WBC: CPT | Performed by: EMERGENCY MEDICINE

## 2022-07-02 PROCEDURE — 82550 ASSAY OF CK (CPK): CPT | Performed by: EMERGENCY MEDICINE

## 2022-07-02 PROCEDURE — 93971 EXTREMITY STUDY: CPT | Performed by: SURGERY

## 2022-07-02 PROCEDURE — 83735 ASSAY OF MAGNESIUM: CPT | Performed by: EMERGENCY MEDICINE

## 2022-07-02 PROCEDURE — 99284 EMERGENCY DEPT VISIT MOD MDM: CPT

## 2022-07-02 PROCEDURE — 75635 CT ANGIO ABDOMINAL ARTERIES: CPT

## 2022-07-02 PROCEDURE — 96365 THER/PROPH/DIAG IV INF INIT: CPT

## 2022-07-02 RX ORDER — MORPHINE SULFATE 4 MG/ML
4 INJECTION, SOLUTION INTRAMUSCULAR; INTRAVENOUS ONCE
Status: COMPLETED | OUTPATIENT
Start: 2022-07-02 | End: 2022-07-02

## 2022-07-02 RX ORDER — CEFAZOLIN SODIUM 2 G/50ML
2000 SOLUTION INTRAVENOUS ONCE
Status: COMPLETED | OUTPATIENT
Start: 2022-07-02 | End: 2022-07-02

## 2022-07-02 RX ORDER — IODIXANOL 320 MG/ML
100 INJECTION, SOLUTION INTRAVASCULAR
Status: COMPLETED | OUTPATIENT
Start: 2022-07-02 | End: 2022-07-02

## 2022-07-02 RX ADMIN — MORPHINE SULFATE 4 MG: 4 INJECTION INTRAVENOUS at 11:02

## 2022-07-02 RX ADMIN — CEFAZOLIN SODIUM 2000 MG: 2 SOLUTION INTRAVENOUS at 13:50

## 2022-07-02 RX ADMIN — IODIXANOL 100 ML: 320 INJECTION, SOLUTION INTRAVASCULAR at 11:36

## 2022-07-02 NOTE — ED NOTES
Redness noted to the left lower leg, Positive pedal pulses using the doppler    Patient able to flex and point affected foot and leg with a pain level of 8/10     Kamila Schreiber RN  07/02/22 9306

## 2022-07-02 NOTE — ED PROVIDER NOTES
History  Chief Complaint   Patient presents with    Leg Pain     Pt  reports onset of pain and swelling to the left lower extremity Thursday night  Pt reports no injury to the area  Pt has good pulses in L foot, both feet/calves are equal in temp, no heat to the area of redness on L leg, Hx of arthritis      78-year-old female presents with left lower leg pain and swelling with rash noted  She had some ecchymosis so her PCP sent her into the ED for evaluation of the leg to rule out DVT  She denies any chest pain shortness of breath no trauma noted  Not on blood thinners  No fevers or chills nausea vomiting no other symptoms  History provided by:  Patient   used: No        Prior to Admission Medications   Prescriptions Last Dose Informant Patient Reported?  Taking?   acetaminophen (TYLENOL) 325 mg tablet Not Taking at Unknown time  No No   Sig: Take 2 tablets (650 mg total) by mouth every 6 (six) hours as needed for mild pain, headaches or fever   Patient not taking: Reported on 7/2/2022   cyclobenzaprine (FLEXERIL) 10 mg tablet Not Taking at Unknown time  Yes No   Sig: Take 10 mg by mouth 3 (three) times a day as needed for muscle spasms   Patient not taking: Reported on 7/2/2022   dextromethorphan-guaiFENesin (ROBITUSSIN DM)  mg/5 mL syrup Not Taking at Unknown time  No No   Sig: Take 10 mL by mouth 3 (three) times a day as needed for cough   Patient not taking: Reported on 7/2/2022   diazepam (VALIUM) 2 mg tablet Not Taking at Unknown time  Yes No   Sig: Take 2 mg by mouth every 6 (six) hours as needed for anxiety   Patient not taking: Reported on 7/2/2022   oxyCODONE-acetaminophen (PERCOCET) 5-325 mg per tablet 7/2/2022 at Unknown time  Yes Yes   Sig: Take 1 tablet by mouth every 4 (four) hours as needed for moderate pain      Facility-Administered Medications: None       Past Medical History:   Diagnosis Date    Arthritis     Disease of thyroid gland     History of transfusion        Past Surgical History:   Procedure Laterality Date    APPENDECTOMY      HERNIA REPAIR      OOPHORECTOMY Right        History reviewed  No pertinent family history  I have reviewed and agree with the history as documented  E-Cigarette/Vaping     E-Cigarette/Vaping Substances     Social History     Tobacco Use    Smoking status: Former Smoker     Quit date:      Years since quittin 5    Smokeless tobacco: Never Used   Substance Use Topics    Alcohol use: Never     Alcohol/week: 0 0 standard drinks    Drug use: Never       Review of Systems   Constitutional: Negative  HENT: Negative  Eyes: Negative  Respiratory: Negative  Cardiovascular: Positive for leg swelling  Gastrointestinal: Negative  Endocrine: Negative  Genitourinary: Negative  Musculoskeletal: Positive for arthralgias and joint swelling  Skin: Positive for rash  Allergic/Immunologic: Negative  Neurological: Negative  Hematological: Negative  Psychiatric/Behavioral: Negative  All other systems reviewed and are negative  Physical Exam  Physical Exam  Constitutional:       Appearance: Normal appearance  HENT:      Head: Normocephalic and atraumatic  Nose: Nose normal       Mouth/Throat:      Mouth: Mucous membranes are moist    Eyes:      Extraocular Movements: Extraocular movements intact  Pupils: Pupils are equal, round, and reactive to light  Cardiovascular:      Rate and Rhythm: Normal rate and regular rhythm  Pulmonary:      Effort: Pulmonary effort is normal       Breath sounds: Normal breath sounds  Abdominal:      General: Abdomen is flat  Bowel sounds are normal       Palpations: Abdomen is soft  Musculoskeletal:         General: Normal range of motion  Cervical back: Normal range of motion and neck supple        Comments: Left lower extremity: rash noted with mild ecchymosis positive DP,PT pulse intact, tenderness along the posterior calf, soft compartments  edema noted, rash consistent with cellulitis  Skin:     General: Skin is warm  Capillary Refill: Capillary refill takes less than 2 seconds  Neurological:      General: No focal deficit present  Mental Status: She is alert and oriented to person, place, and time  Mental status is at baseline  Psychiatric:         Mood and Affect: Mood normal          Thought Content:  Thought content normal          Vital Signs  ED Triage Vitals [07/02/22 1024]   Temperature Pulse Respirations Blood Pressure SpO2   98 7 °F (37 1 °C) (!) 115 18 151/69 95 %      Temp Source Heart Rate Source Patient Position - Orthostatic VS BP Location FiO2 (%)   Tympanic Monitor Sitting Left arm --      Pain Score       5           Vitals:    07/02/22 1024 07/02/22 1347 07/02/22 1400   BP: 151/69 108/68 108/68   Pulse: (!) 115 96 92   Patient Position - Orthostatic VS: Sitting Lying          Visual Acuity      ED Medications  Medications   morphine injection 4 mg (4 mg Intravenous Given 7/2/22 1102)   iodixanol (VISIPAQUE) 320 MG/ML injection 100 mL (100 mL Intravenous Given 7/2/22 1136)   ceFAZolin (ANCEF) IVPB (premix in dextrose) 2,000 mg 50 mL (0 mg Intravenous Stopped 7/2/22 1412)       Diagnostic Studies  Results Reviewed     Procedure Component Value Units Date/Time    Comprehensive metabolic panel [[id number]]  (Abnormal) Collected: 07/02/22 1054    Lab Status: Final result Specimen: Blood from Arm, Left Updated: 07/02/22 1119     Sodium 139 mmol/L      Potassium 3 7 mmol/L      Chloride 103 mmol/L      CO2 22 mmol/L      ANION GAP 14 mmol/L      BUN 14 mg/dL      Creatinine 0 96 mg/dL      Glucose 108 mg/dL      Calcium 8 9 mg/dL      Corrected Calcium 9 5 mg/dL      AST 16 U/L      ALT 19 U/L      Alkaline Phosphatase 80 U/L      Total Protein 7 2 g/dL      Albumin 3 2 g/dL      Total Bilirubin 0 68 mg/dL      eGFR 63 ml/min/1 73sq m     Narrative:      [place] guidelines for Chronic Kidney Disease (CKD):     Stage 1 with normal or high GFR (GFR > 90 mL/min/1 73 square meters)    Stage 2 Mild CKD (GFR = 60-89 mL/min/1 73 square meters)    Stage 3A Moderate CKD (GFR = 45-59 mL/min/1 73 square meters)    Stage 3B Moderate CKD (GFR = 30-44 mL/min/1 73 square meters)    Stage 4 Severe CKD (GFR = 15-29 mL/min/1 73 square meters)    Stage 5 End Stage CKD (GFR <15 mL/min/1 73 square meters)  Note: GFR calculation is accurate only with a steady state creatinine    Magnesium [[id number]]  (Normal) Collected: 07/02/22 1054    Lab Status: Final result Specimen: Blood from Arm, Left Updated: 07/02/22 1119     Magnesium 2 3 mg/dL     CK (with reflex to MB) [[id number]]  (Normal) Collected: 07/02/22 1054    Lab Status: Final result Specimen: Blood from Arm, Left Updated: 07/02/22 1119     Total CK 36 U/L     CBC and differential [[id number]]  (Abnormal) Collected: 07/02/22 1054    Lab Status: Final result Specimen: Blood from Arm, Left Updated: 07/02/22 1102     WBC 11 16 Thousand/uL      RBC 5 13 Million/uL      Hemoglobin 14 4 g/dL      Hematocrit 44 7 %      MCV 87 fL      MCH 28 1 pg      MCHC 32 2 g/dL      RDW 13 2 %      MPV 9 4 fL      Platelets 704 Thousands/uL      nRBC 0 /100 WBCs      Neutrophils Relative 85 %      Immat GRANS % 0 %      Lymphocytes Relative 9 %      Monocytes Relative 6 %      Eosinophils Relative 0 %      Basophils Relative 0 %      Neutrophils Absolute 9 38 Thousands/µL      Immature Grans Absolute 0 05 Thousand/uL      Lymphocytes Absolute 1 05 Thousands/µL      Monocytes Absolute 0 63 Thousand/µL      Eosinophils Absolute 0 01 Thousand/µL      Basophils Absolute 0 04 Thousands/µL                  CTA abdominal w run off w wo contrast   Final Result by Kaylee Newton MD (07/02 7259)         1    CTA abdominal aorta and bilateral iliofemoral runoff demonstrates normal systemic arterial vasculature without evidence for appreciable atherosclerotic disease, vascular stenosis, occlusion, or thromboembolic disease with three-vessel runoff to both    ankles and feet  2   Mild nonspecific edema in the subcutaneous soft tissues of the distal left leg, ankle and foot without discrete fluid collection or other focal abnormality appreciated  3   Marked rotatory lumbar scoliosis  4   Additional stable findings as noted  Workstation performed: [id number]         VAS lower limb venous duplex study, unilateral/limited    (Results Pending)              Procedures  Procedures         ED Course                               SBIRT 20yo+    Flowsheet Row Most Recent Value   SBIRT (25 yo +)    In order to provide better care to our patients, we are screening all of our patients for alcohol and drug use  Would it be okay to ask you these screening questions? No Filed at: 07/02/2022 1055                    MDM  Number of Diagnoses or Management Options     Amount and/or Complexity of Data Reviewed  Clinical lab tests: ordered and reviewed  Tests in the radiology section of CPT®: ordered and reviewed  Tests in the medicine section of CPT®: ordered and reviewed    Patient Progress  Patient progress: stable      Disposition  Final diagnoses:   Cellulitis     Time reflects when diagnosis was documented in both MDM as applicable and the Disposition within this note     Time User Action Codes Description Comment    7/2/2022  1:34 PM Tyrone Urias Add [L03 90] Cellulitis       ED Disposition     ED Disposition   Discharge    Condition   Stable    Date/Time   Sat Jul 2, 2022  1:06 PM    Comment   Lucy Mantilla discharge to home/self care                 Follow-up Information     Follow up With Specialties Details Why Contact Info Additional Information    Luisa Morel MD Family Medicine In 2 days For wound re-check [address] [address] Jeanne Cohn Emergency Department Emergency Medicine  If symptoms worsen [address] [place] [address] [place]arro [id number]  [address] [address] Emergency Department, Eldon Farfan, [place] hernandez, [phone]          Discharge Medication List as of 7/2/2022  1:34 PM      CONTINUE these medications which have NOT CHANGED    Details   oxyCODONE-acetaminophen (PERCOCET) 5-325 mg per tablet Take 1 tablet by mouth every 4 (four) hours as needed for moderate pain, Historical Med      acetaminophen (TYLENOL) 325 mg tablet Take 2 tablets (650 mg total) by mouth every 6 (six) hours as needed for mild pain, headaches or fever, Starting Thu 10/24/2019, No Print      cyclobenzaprine (FLEXERIL) 10 mg tablet Take 10 mg by mouth 3 (three) times a day as needed for muscle spasms, Historical Med      dextromethorphan-guaiFENesin (ROBITUSSIN DM)  mg/5 mL syrup Take 10 mL by mouth 3 (three) times a day as needed for cough, Starting Thu 10/24/2019, Normal      diazepam (VALIUM) 2 mg tablet Take 2 mg by mouth every 6 (six) hours as needed for anxiety, Historical Med             No discharge procedures on file      PDMP Review     None          ED Provider  Electronically Signed by           Cate Ortiz DO  07/02/22 3563

## 2023-01-23 ENCOUNTER — APPOINTMENT (EMERGENCY)
Dept: RADIOLOGY | Facility: HOSPITAL | Age: 63
End: 2023-01-23

## 2023-01-23 ENCOUNTER — HOSPITAL ENCOUNTER (EMERGENCY)
Facility: HOSPITAL | Age: 63
Discharge: HOME | End: 2023-01-23
Attending: EMERGENCY MEDICINE

## 2023-01-23 VITALS
WEIGHT: 170 LBS | BODY MASS INDEX: 29.02 KG/M2 | HEART RATE: 101 BPM | HEIGHT: 64 IN | SYSTOLIC BLOOD PRESSURE: 112 MMHG | OXYGEN SATURATION: 97 % | RESPIRATION RATE: 17 BRPM | DIASTOLIC BLOOD PRESSURE: 71 MMHG | TEMPERATURE: 98.3 F

## 2023-01-23 DIAGNOSIS — K52.9 COLITIS: Primary | ICD-10-CM

## 2023-01-23 LAB
ALBUMIN SERPL BCP-MCNC: 4 G/DL (ref 3.5–5)
ALP SERPL-CCNC: 109 U/L (ref 46–116)
ALT SERPL W P-5'-P-CCNC: 37 U/L (ref 12–78)
ANION GAP SERPL CALCULATED.3IONS-SCNC: 15 MMOL/L (ref 4–13)
AST SERPL W P-5'-P-CCNC: 37 U/L (ref 5–45)
BACTERIA UR QL AUTO: ABNORMAL /HPF
BASOPHILS # BLD AUTO: 0.03 THOUSANDS/ÂΜL (ref 0–0.1)
BASOPHILS NFR BLD AUTO: 0 % (ref 0–1)
BILIRUB SERPL-MCNC: 0.71 MG/DL (ref 0.2–1)
BILIRUB UR QL STRIP: ABNORMAL
BUN SERPL-MCNC: 15 MG/DL (ref 5–25)
CALCIUM SERPL-MCNC: 9.4 MG/DL (ref 8.3–10.1)
CHLORIDE SERPL-SCNC: 103 MMOL/L (ref 96–108)
CLARITY UR: CLEAR
CO2 SERPL-SCNC: 25 MMOL/L (ref 21–32)
COLOR UR: YELLOW
CREAT SERPL-MCNC: 1.08 MG/DL (ref 0.6–1.3)
EOSINOPHIL # BLD AUTO: 0.02 THOUSAND/ÂΜL (ref 0–0.61)
EOSINOPHIL NFR BLD AUTO: 0 % (ref 0–6)
ERYTHROCYTE [DISTWIDTH] IN BLOOD BY AUTOMATED COUNT: 13 % (ref 11.6–15.1)
GFR SERPL CREATININE-BSD FRML MDRD: 54 ML/MIN/1.73SQ M
GLUCOSE SERPL-MCNC: 155 MG/DL (ref 65–140)
GLUCOSE UR STRIP-MCNC: NEGATIVE MG/DL
HCT VFR BLD AUTO: 48.5 % (ref 34.8–46.1)
HGB BLD-MCNC: 16.5 G/DL (ref 11.5–15.4)
HGB UR QL STRIP.AUTO: NEGATIVE
HYALINE CASTS #/AREA URNS LPF: ABNORMAL /LPF
IMM GRANULOCYTES # BLD AUTO: 0.02 THOUSAND/UL (ref 0–0.2)
IMM GRANULOCYTES NFR BLD AUTO: 0 % (ref 0–2)
KETONES UR STRIP-MCNC: ABNORMAL MG/DL
LEUKOCYTE ESTERASE UR QL STRIP: NEGATIVE
LIPASE SERPL-CCNC: 92 U/L (ref 73–393)
LYMPHOCYTES # BLD AUTO: 0.74 THOUSANDS/ÂΜL (ref 0.6–4.47)
LYMPHOCYTES NFR BLD AUTO: 9 % (ref 14–44)
MCH RBC QN AUTO: 29.3 PG (ref 26.8–34.3)
MCHC RBC AUTO-ENTMCNC: 34 G/DL (ref 31.4–37.4)
MCV RBC AUTO: 86 FL (ref 82–98)
MONOCYTES # BLD AUTO: 0.35 THOUSAND/ÂΜL (ref 0.17–1.22)
MONOCYTES NFR BLD AUTO: 4 % (ref 4–12)
MUCOUS THREADS UR QL AUTO: ABNORMAL
NEUTROPHILS # BLD AUTO: 6.97 THOUSANDS/ÂΜL (ref 1.85–7.62)
NEUTS SEG NFR BLD AUTO: 87 % (ref 43–75)
NITRITE UR QL STRIP: NEGATIVE
NON-SQ EPI CELLS URNS QL MICRO: ABNORMAL /HPF
NRBC BLD AUTO-RTO: 0 /100 WBCS
PH UR STRIP.AUTO: 8.5 [PH]
PLATELET # BLD AUTO: 269 THOUSANDS/UL (ref 149–390)
PMV BLD AUTO: 9.3 FL (ref 8.9–12.7)
POTASSIUM SERPL-SCNC: 3.5 MMOL/L (ref 3.5–5.3)
PROT SERPL-MCNC: 7.3 G/DL (ref 6.4–8.4)
PROT UR STRIP-MCNC: ABNORMAL MG/DL
RBC # BLD AUTO: 5.63 MILLION/UL (ref 3.81–5.12)
RBC #/AREA URNS AUTO: ABNORMAL /HPF
SODIUM SERPL-SCNC: 143 MMOL/L (ref 135–147)
SP GR UR STRIP.AUTO: 1.01 (ref 1–1.03)
UROBILINOGEN UR QL STRIP.AUTO: 0.2 E.U./DL
WBC # BLD AUTO: 8.13 THOUSAND/UL (ref 4.31–10.16)
WBC #/AREA URNS AUTO: ABNORMAL /HPF

## 2023-01-23 RX ORDER — FAMOTIDINE 20 MG/1
20 TABLET, FILM COATED ORAL DAILY
Qty: 30 TABLET | Refills: 0 | Status: SHIPPED | OUTPATIENT
Start: 2023-01-23

## 2023-01-23 RX ORDER — HYDROMORPHONE HCL/PF 1 MG/ML
1 SYRINGE (ML) INJECTION ONCE
Status: COMPLETED | OUTPATIENT
Start: 2023-01-23 | End: 2023-01-23

## 2023-01-23 RX ORDER — FAMOTIDINE 10 MG/ML
20 INJECTION, SOLUTION INTRAVENOUS ONCE
Status: COMPLETED | OUTPATIENT
Start: 2023-01-23 | End: 2023-01-23

## 2023-01-23 RX ORDER — AMOXICILLIN AND CLAVULANATE POTASSIUM 875; 125 MG/1; MG/1
1 TABLET, FILM COATED ORAL EVERY 12 HOURS
Qty: 14 TABLET | Refills: 0 | Status: SHIPPED | OUTPATIENT
Start: 2023-01-23 | End: 2023-01-30

## 2023-01-23 RX ORDER — DICYCLOMINE HCL 20 MG
20 TABLET ORAL 2 TIMES DAILY
Qty: 20 TABLET | Refills: 0 | Status: SHIPPED | OUTPATIENT
Start: 2023-01-23

## 2023-01-23 RX ORDER — ONDANSETRON 4 MG/1
4 TABLET, FILM COATED ORAL EVERY 6 HOURS
Qty: 12 TABLET | Refills: 0 | Status: SHIPPED | OUTPATIENT
Start: 2023-01-23

## 2023-01-23 RX ORDER — ONDANSETRON 2 MG/ML
4 INJECTION INTRAMUSCULAR; INTRAVENOUS ONCE
Status: COMPLETED | OUTPATIENT
Start: 2023-01-23 | End: 2023-01-23

## 2023-01-23 RX ADMIN — SODIUM CHLORIDE 1000 ML: 0.9 INJECTION, SOLUTION INTRAVENOUS at 20:41

## 2023-01-23 RX ADMIN — HYDROMORPHONE HYDROCHLORIDE 1 MG: 1 INJECTION, SOLUTION INTRAMUSCULAR; INTRAVENOUS; SUBCUTANEOUS at 21:11

## 2023-01-23 RX ADMIN — FAMOTIDINE 20 MG: 10 INJECTION, SOLUTION INTRAVENOUS at 20:41

## 2023-01-23 RX ADMIN — ONDANSETRON 4 MG: 2 INJECTION INTRAMUSCULAR; INTRAVENOUS at 20:41

## 2023-01-23 RX ADMIN — IOHEXOL 100 ML: 350 INJECTION, SOLUTION INTRAVENOUS at 22:19

## 2023-01-24 NOTE — ED PROVIDER NOTES
History  Chief Complaint   Patient presents with   • Abdominal Pain     Complains of 10/10 mid abdominal pain starting yesterday  Now has nausea, vomiting, and diarrhea  States maybe had 3 episodes of "dark" vomit that she thinks could have been blood  HPI  Patient is a 78-year-old female history of peptic ulcer disease, chronic opiate use for back pain presenting for evaluation of 2 days of severe epigastric pain with radiation into the mid abdomen, persistent nausea, vomiting, diarrhea  Patient estimates that she has had 10+ episodes of nonbloody nonbilious emesis, states that they have been somewhat dark  Patient additionally stating 10+ episodes of nonbloody nonmelanotic diarrhea  Patient states several weeks of dysuria and urinary frequency, states that she was started on an antibiotic last week but did not tolerated due to GI symptoms and stopped taking it  Patient denies flank pain, fevers, chills, rigors  Patient denies headache, sore throat, cough, recent travel or sick contacts  Patient states that she abruptly stopped taking Percocet 3 days ago  Patient states that several years ago she had peptic ulcer disease and was severely anemic requiring blood transfusion  Patient states that she is not currently taking any medications for gastritis/peptic ulcer disease  Prior to Admission Medications   Prescriptions Last Dose Informant Patient Reported?  Taking?   acetaminophen (TYLENOL) 325 mg tablet   No No   Sig: Take 2 tablets (650 mg total) by mouth every 6 (six) hours as needed for mild pain, headaches or fever   Patient not taking: Reported on 7/2/2022   cyclobenzaprine (FLEXERIL) 10 mg tablet   Yes No   Sig: Take 10 mg by mouth 3 (three) times a day as needed for muscle spasms   Patient not taking: Reported on 7/2/2022   dextromethorphan-guaiFENesin (ROBITUSSIN DM)  mg/5 mL syrup   No No   Sig: Take 10 mL by mouth 3 (three) times a day as needed for cough   Patient not taking: Reported on 2022   diazepam (VALIUM) 2 mg tablet   Yes No   Sig: Take 2 mg by mouth every 6 (six) hours as needed for anxiety   Patient not taking: Reported on 2022   oxyCODONE-acetaminophen (PERCOCET) 5-325 mg per tablet   Yes No   Sig: Take 1 tablet by mouth every 4 (four) hours as needed for moderate pain      Facility-Administered Medications: None       Past Medical History:   Diagnosis Date   • Arthritis    • Disease of thyroid gland    • History of transfusion        Past Surgical History:   Procedure Laterality Date   • APPENDECTOMY     • HERNIA REPAIR     • OOPHORECTOMY Right        History reviewed  No pertinent family history  I have reviewed and agree with the history as documented  E-Cigarette/Vaping   • E-Cigarette Use Never User      E-Cigarette/Vaping Substances     Social History     Tobacco Use   • Smoking status: Former     Types: Cigarettes     Quit date:      Years since quittin 0   • Smokeless tobacco: Never   Vaping Use   • Vaping Use: Never used   Substance Use Topics   • Alcohol use: Never     Alcohol/week: 0 0 standard drinks   • Drug use: Yes     Types: Marijuana     Comment: yesterday       Review of Systems   Constitutional: Negative for chills, fatigue and fever  HENT: Negative for sore throat  Eyes: Negative for visual disturbance  Respiratory: Negative for cough, chest tightness and shortness of breath  Cardiovascular: Negative for chest pain  Gastrointestinal: Positive for abdominal pain, diarrhea, nausea and vomiting  Negative for abdominal distention and constipation  Endocrine: Negative for polydipsia and polyuria  Genitourinary: Negative for dysuria and hematuria  Skin: Negative for color change and rash  Neurological: Negative for dizziness and headaches  Psychiatric/Behavioral: Negative for confusion  All other systems reviewed and are negative  Physical Exam  Physical Exam  Vitals reviewed     Constitutional:       General: She is not in acute distress  Appearance: She is well-developed  She is not diaphoretic  Comments: Uncomfortable appearing, tearful but nontoxic nondistressed   HENT:      Head: Normocephalic and atraumatic  Comments: Moist mucous membranes  Not actively vomiting  Right Ear: External ear normal       Left Ear: External ear normal       Nose: Nose normal       Mouth/Throat:      Pharynx: No oropharyngeal exudate  Eyes:      Pupils: Pupils are equal, round, and reactive to light  Cardiovascular:      Rate and Rhythm: Normal rate and regular rhythm  Heart sounds: Normal heart sounds  No murmur heard  No friction rub  No gallop  Comments: Regular rate and rhythm, no murmurs rubs or gallops  Extremities warm and well-perfused without mottling  Pulmonary:      Effort: Pulmonary effort is normal  No respiratory distress  Breath sounds: Normal breath sounds  No wheezing  Comments: No increased work of breathing  Speaking complete sentences  Satting 98% on room air indicating adequate oxygenation  Lungs clear to auscultation bilaterally without wheezes, rales, rhonchi  Chest:      Chest wall: No tenderness  Abdominal:      General: Bowel sounds are normal  There is no distension  Palpations: Abdomen is soft  There is no mass  Tenderness: There is abdominal tenderness  There is no guarding  Comments: Primarily epigastric tenderness without rigidity, rebound, guarding  No specific right upper quadrant tenderness  Negative Hampton sign  Abdomen nondistended with normal bowel sounds  Musculoskeletal:         General: No deformity  Normal range of motion  Cervical back: Normal range of motion  Lymphadenopathy:      Cervical: No cervical adenopathy  Skin:     General: Skin is warm and dry  Capillary Refill: Capillary refill takes less than 2 seconds  Neurological:      Mental Status: She is alert and oriented to person, place, and time        Comments: AAOx3 Vital Signs  ED Triage Vitals [01/23/23 2024]   Temperature Pulse Respirations Blood Pressure SpO2   98 3 °F (36 8 °C) 69 18 134/75 100 %      Temp Source Heart Rate Source Patient Position - Orthostatic VS BP Location FiO2 (%)   Oral Monitor Sitting Left arm --      Pain Score       10 - Worst Possible Pain           Vitals:    01/23/23 2130 01/23/23 2200 01/23/23 2300 01/23/23 2330   BP: 113/70 131/75 112/64 112/71   Pulse: 82 83 91 101   Patient Position - Orthostatic VS: Sitting Sitting Sitting Sitting         Visual Acuity      ED Medications  Medications   ondansetron (ZOFRAN) injection 4 mg (4 mg Intravenous Given 1/23/23 2041)   Famotidine (PF) (PEPCID) injection 20 mg (20 mg Intravenous Given 1/23/23 2041)   sodium chloride 0 9 % bolus 1,000 mL (0 mL Intravenous Stopped 1/23/23 2342)   HYDROmorphone (DILAUDID) injection 1 mg (1 mg Intravenous Given 1/23/23 2111)   iohexol (OMNIPAQUE) 350 MG/ML injection (SINGLE-DOSE) 100 mL (100 mL Intravenous Given 1/23/23 2219)       Diagnostic Studies  Results Reviewed     Procedure Component Value Units Date/Time    Urine Microscopic [[id number]]  (Abnormal) Collected: 01/23/23 2159    Lab Status: Final result Specimen: Urine, Clean Catch Updated: 01/23/23 2212     RBC, UA None Seen /hpf      WBC, UA 0-1 /hpf      Epithelial Cells Occasional /hpf      Bacteria, UA Occasional /hpf      Hyaline Casts, UA 0-1 /lpf      MUCUS THREADS Occasional    UA (URINE) with reflex to Scope [[id number]]  (Abnormal) Collected: 01/23/23 2159    Lab Status: Final result Specimen: Urine, Clean Catch Updated: 01/23/23 2204     Color, UA Yellow     Clarity, UA Clear     Specific Gravity, UA 1 015     pH, UA 8 5     Leukocytes, UA Negative     Nitrite, UA Negative     Protein, UA Trace mg/dl      Glucose, UA Negative mg/dl      Ketones, UA >=80 (3+) mg/dl      Urobilinogen, UA 0 2 E U /dl      Bilirubin, UA Small     Occult Blood, UA Negative    Comprehensive metabolic panel [[id number]] (Abnormal) Collected: 01/23/23 2040    Lab Status: Final result Specimen: Blood from Arm, Left Updated: 01/23/23 2107     Sodium 143 mmol/L      Potassium 3 5 mmol/L      Chloride 103 mmol/L      CO2 25 mmol/L      ANION GAP 15 mmol/L      BUN 15 mg/dL      Creatinine 1 08 mg/dL      Glucose 155 mg/dL      Calcium 9 4 mg/dL      AST 37 U/L      ALT 37 U/L      Alkaline Phosphatase 109 U/L      Total Protein 7 3 g/dL      Albumin 4 0 g/dL      Total Bilirubin 0 71 mg/dL      eGFR 54 ml/min/1 73sq m     Narrative:      [facility] guidelines for Chronic Kidney Disease (CKD):   •  Stage 1 with normal or high GFR (GFR > 90 mL/min/1 73 square meters)  •  Stage 2 Mild CKD (GFR = 60-89 mL/min/1 73 square meters)  •  Stage 3A Moderate CKD (GFR = 45-59 mL/min/1 73 square meters)  •  Stage 3B Moderate CKD (GFR = 30-44 mL/min/1 73 square meters)  •  Stage 4 Severe CKD (GFR = 15-29 mL/min/1 73 square meters)  •  Stage 5 End Stage CKD (GFR <15 mL/min/1 73 square meters)  Note: GFR calculation is accurate only with a steady state creatinine    Lipase [[id number]]  (Normal) Collected: 01/23/23 2040    Lab Status: Final result Specimen: Blood from Arm, Left Updated: 01/23/23 2107     Lipase 92 u/L     CBC and differential [[id number]]  (Abnormal) Collected: 01/23/23 2040    Lab Status: Final result Specimen: Blood from Arm, Left Updated: 01/23/23 2051     WBC 8 13 Thousand/uL      RBC 5 63 Million/uL      Hemoglobin 16 5 g/dL      Hematocrit 48 5 %      MCV 86 fL      MCH 29 3 pg      MCHC 34 0 g/dL      RDW 13 0 %      MPV 9 3 fL      Platelets 343 Thousands/uL      nRBC 0 /100 WBCs      Neutrophils Relative 87 %      Immat GRANS % 0 %      Lymphocytes Relative 9 %      Monocytes Relative 4 %      Eosinophils Relative 0 %      Basophils Relative 0 %      Neutrophils Absolute 6 97 Thousands/µL      Immature Grans Absolute 0 02 Thousand/uL      Lymphocytes Absolute 0 74 Thousands/µL      Monocytes Absolute 0 35 Thousand/µL      Eosinophils Absolute 0 02 Thousand/µL      Basophils Absolute 0 03 Thousands/µL                  CT abdomen pelvis with contrast   Final Result by Eduardo Pino MD (01/23 2300)      Findings consistent colitis at the descending and sigmoid colon, likely of infectious or inflammatory etiology            Workstation performed: [id number]                    Procedures  Procedures         ED Course                                             Medical Decision Making  History obtained from the patient  Given patient's history of peptic ulcer disease, differential including but not limited to recurrence of peptic ulcer disease/gastritis, additionally GI side effects of opiate withdrawal, gastroenteritis, pancreatitis, small bowel obstruction given patient's prior history of hiatal hernia repair  Plan for belly labs including CBC, CMP, lipase to evaluate for electrolyte or renal derangement, anemia in the setting of prior peptic ulcer disease, lipase to suggest pancreatitis  CT abdomen pelvis to evaluate for intra-abdominal pathology  Symptomatic management with Zofran, Dilaudid for severe pain, IV fluids  Patient with mildly elevated hemoglobin which may reflect hemoconcentration in this clinical situation  Per my independent review, patient without additional significant lab derangements  CT abdomen pelvis demonstrating findings suggestive of colitis of potential infectious or inflammatory etiology  I reviewed these findings and updated the patient  Patient was started on course of Augmentin for potential infectious etiology, provided with referral to gastroenterology, discharged with verbal and written return precautions  Colitis: acute illness or injury  Amount and/or Complexity of Data Reviewed  Labs: ordered  Radiology: ordered  Risk  Prescription drug management            Disposition  Final diagnoses:   Colitis     Time reflects when diagnosis was documented in both MDM as applicable and the Disposition within this note     Time User Action Codes Description Comment    1/23/2023 11:08 PM Akutancarmela Arceo Add [K52 9] Colitis       ED Disposition     ED Disposition   Discharge    Condition   Stable    Date/Time   Mon Jan 23, 2023 11:07 PM    Comment   Dimple Andrea discharge to home/self care                 Follow-up Information     Follow up With Specialties Details Why Contact Info Additional Information    [facility] Gastroenterology [facility] [address] Gastroenterology   [address] [facility] [address]  [address] [facility] Gastroenterology [facility] [address], [address] [place], [place], [address]          Discharge Medication List as of 1/23/2023 11:20 PM      START taking these medications    Details   amoxicillin-clavulanate (AUGMENTIN) 875-125 mg per tablet Take 1 tablet by mouth every 12 (twelve) hours for 7 days, Starting Mon 1/23/2023, Until Mon 1/30/2023, Normal      dicyclomine (BENTYL) 20 mg tablet Take 1 tablet (20 mg total) by mouth 2 (two) times a day, Starting Mon 1/23/2023, Normal      famotidine (PEPCID) 20 mg tablet Take 1 tablet (20 mg total) by mouth daily, Starting Mon 1/23/2023, Normal      ondansetron (ZOFRAN) 4 mg tablet Take 1 tablet (4 mg total) by mouth every 6 (six) hours, Starting Mon 1/23/2023, Normal         CONTINUE these medications which have NOT CHANGED    Details   acetaminophen (TYLENOL) 325 mg tablet Take 2 tablets (650 mg total) by mouth every 6 (six) hours as needed for mild pain, headaches or fever, Starting Thu 10/24/2019, No Print      cyclobenzaprine (FLEXERIL) 10 mg tablet Take 10 mg by mouth 3 (three) times a day as needed for muscle spasms, Historical Med      dextromethorphan-guaiFENesin (ROBITUSSIN DM)  mg/5 mL syrup Take 10 mL by mouth 3 (three) times a day as needed for cough, Starting Thu 10/24/2019, Normal      diazepam (VALIUM) 2 mg tablet Take 2 mg by mouth every 6 (six) hours as needed for anxiety, Historical Med      oxyCODONE-acetaminophen (PERCOCET) 5-325 mg per tablet Take 1 tablet by mouth every 4 (four) hours as needed for moderate pain, Historical Med                 PDMP Review     None          ED Provider  Electronically Signed by           Jen Lopez MD  01/25/23 4410

## 2023-01-24 NOTE — ED NOTES
Patient worried she might be going through percocet withdrawal  States last dose was Friday and she cannot get more until Thursday  Usually takes 7 5 mg 3x/day every day        Hiram Lanen, [address] [facility]  01/23/23 2033

## 2023-01-24 NOTE — DISCHARGE INSTRUCTIONS
Your CAT scan showed inflammation of your colon  It is possible that this is being caused by an infection and we have started you with antibiotics  This can also be caused by different inflammatory/autoimmune conditions  We have provided referral to gastroenterology  If you do not hear from them in the next few days, call the number above to schedule an appointment with them  Use the prescribed Bentyl for crampy abdominal pain, Zofran for nausea  We have also given you a prescription for Pepcid for your history of stomach ulcers  If your pain significantly worsens, if you have persistent nausea and vomiting and are not able to drink fluids, if you are vomiting a lot of blood or have a lot of blood in your stool, return to the emergency department

## 2023-03-31 ENCOUNTER — TELEPHONE (OUTPATIENT)
Dept: GASTROENTEROLOGY | Facility: CLINIC | Age: 63
End: 2023-03-31

## 2023-04-23 ENCOUNTER — HOSPITAL ENCOUNTER (EMERGENCY)
Facility: HOSPITAL | Age: 63
Discharge: HOME | End: 2023-04-23
Attending: EMERGENCY MEDICINE | Admitting: EMERGENCY MEDICINE

## 2023-04-23 VITALS
DIASTOLIC BLOOD PRESSURE: 63 MMHG | HEART RATE: 82 BPM | SYSTOLIC BLOOD PRESSURE: 109 MMHG | OXYGEN SATURATION: 96 % | RESPIRATION RATE: 20 BRPM | TEMPERATURE: 98.6 F
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
## 2023-04-23 DIAGNOSIS — L03.90 CELLULITIS: Primary | ICD-10-CM

## 2023-04-23 RX ORDER — CEFAZOLIN SODIUM 2 G/50ML
2000 SOLUTION INTRAVENOUS ONCE
Status: COMPLETED | OUTPATIENT
Start: 2023-04-23 | End: 2023-04-23

## 2023-04-23 RX ORDER — AMOXICILLIN AND CLAVULANATE POTASSIUM 875; 125 MG/1; MG/1
1 TABLET, FILM COATED ORAL EVERY 12 HOURS
Qty: 14 TABLET | Refills: 0 | Status: SHIPPED | OUTPATIENT
Start: 2023-04-23 | End: 2023-05-03

## 2023-04-23 RX ORDER — HYDROCODONE BITARTRATE AND ACETAMINOPHEN 7.5; 325 MG/1; MG/1
1 TABLET ORAL EVERY 8 HOURS PRN
COMMUNITY

## 2023-04-23 RX ADMIN — CEFAZOLIN SODIUM 2000 MG: 2 SOLUTION INTRAVENOUS at 12:20

## 2023-04-23 RX ADMIN — TETANUS TOXOID, REDUCED DIPHTHERIA TOXOID AND ACELLULAR PERTUSSIS VACCINE, ADSORBED 0.5 ML: 5; 2.5; 8; 8; 2.5 SUSPENSION INTRAMUSCULAR at 12:53

## 2023-04-23 NOTE — ED PROVIDER NOTES
History  Chief Complaint   Patient presents with   • Cat Bite     4/21 on right lower leg  Patient presents with red, hot skin to the area of a bite  Denies fevers  HPI  Patient is a 64-year-old female presenting for evaluation of right lower leg erythema, warmth, pain after being bitten by her cat  Patient states the cat bit her 2 days ago, states that the cat has been aggressive in the past and this is not atypical for it  Patient states that the cat is fully vaccinated  Patient denies fevers, chills, fatigue, constitutional symptoms  Patient does not know her tetanus status  Prior to Admission Medications   Prescriptions Last Dose Informant Patient Reported? Taking?    HYDROcodone-acetaminophen (NORCO) 7 5-325 mg per tablet   Yes Yes   Sig: Take 1 tablet by mouth every 8 (eight) hours as needed for pain   acetaminophen (TYLENOL) 325 mg tablet   No No   Sig: Take 2 tablets (650 mg total) by mouth every 6 (six) hours as needed for mild pain, headaches or fever   Patient not taking: Reported on 7/2/2022   cyclobenzaprine (FLEXERIL) 10 mg tablet   Yes No   Sig: Take 10 mg by mouth 3 (three) times a day as needed for muscle spasms   Patient not taking: Reported on 7/2/2022   dextromethorphan-guaiFENesin (ROBITUSSIN DM)  mg/5 mL syrup   No No   Sig: Take 10 mL by mouth 3 (three) times a day as needed for cough   Patient not taking: Reported on 7/2/2022   diazepam (VALIUM) 2 mg tablet   Yes No   Sig: Take 2 mg by mouth every 6 (six) hours as needed for anxiety   Patient not taking: Reported on 7/2/2022   dicyclomine (BENTYL) 20 mg tablet Not Taking  No No   Sig: Take 1 tablet (20 mg total) by mouth 2 (two) times a day   Patient not taking: Reported on 4/23/2023   famotidine (PEPCID) 20 mg tablet Not Taking  No No   Sig: Take 1 tablet (20 mg total) by mouth daily   Patient not taking: Reported on 4/23/2023   ondansetron (ZOFRAN) 4 mg tablet Not Taking  No No   Sig: Take 1 tablet (4 mg total) by mouth every 6 (six) hours   Patient not taking: Reported on 2023   oxyCODONE-acetaminophen (PERCOCET) 5-325 mg per tablet Not Taking  Yes No   Sig: Take 1 tablet by mouth every 4 (four) hours as needed for moderate pain   Patient not taking: Reported on 2023      Facility-Administered Medications: None       Past Medical History:   Diagnosis Date   • Arthritis    • Disease of thyroid gland    • History of transfusion        Past Surgical History:   Procedure Laterality Date   • APPENDECTOMY     • HERNIA REPAIR     • OOPHORECTOMY Right        History reviewed  No pertinent family history  I have reviewed and agree with the history as documented  E-Cigarette/Vaping   • E-Cigarette Use Never User      E-Cigarette/Vaping Substances     Social History     Tobacco Use   • Smoking status: Former     Types: Cigarettes     Quit date:      Years since quittin 3   • Smokeless tobacco: Never   Vaping Use   • Vaping Use: Never used   Substance Use Topics   • Alcohol use: Never     Alcohol/week: 0 0 standard drinks   • Drug use: Not Currently     Types: Marijuana       Review of Systems   Constitutional: Negative for chills, fatigue and fever  Skin: Positive for color change (Erythema right lower leg) and wound  Negative for pallor and rash  Neurological: Negative for weakness and numbness  Physical Exam  Physical Exam  Vitals and nursing note reviewed  Constitutional:       General: She is not in acute distress  Appearance: Normal appearance  She is not ill-appearing, toxic-appearing or diaphoretic  HENT:      Head: Normocephalic and atraumatic  Right Ear: External ear normal       Left Ear: External ear normal    Eyes:      General:         Right eye: No discharge  Left eye: No discharge  Pulmonary:      Effort: No respiratory distress  Abdominal:      General: There is no distension  Musculoskeletal:         General: No deformity  Cervical back: Normal range of motion  Comments: Area of erythema and warmth right lower extremity consistent with cellulitis  No draining purulence  Moderately tender in the area  No associated crepitus  Skin:     Findings: No lesion or rash  Neurological:      Mental Status: She is alert and oriented to person, place, and time  Mental status is at baseline  Psychiatric:         Mood and Affect: Mood and affect normal          Vital Signs  ED Triage Vitals [04/23/23 1115]   Temperature Pulse Respirations Blood Pressure SpO2   98 6 °F (37 °C) 91 20 97/61 96 %      Temp Source Heart Rate Source Patient Position - Orthostatic VS BP Location FiO2 (%)   Oral Monitor Lying Right arm --      Pain Score       --           Vitals:    04/23/23 1118 04/23/23 1133 04/23/23 1148 04/23/23 1203   BP: 97/61   97/60   Pulse: 90 84 84 84   Patient Position - Orthostatic VS:             Visual Acuity      ED Medications  Medications   ceFAZolin (ANCEF) IVPB (premix in dextrose) 2,000 mg 50 mL (2,000 mg Intravenous New Bag 4/23/23 1220)   tetanus-diphtheria-acellular pertussis (BOOSTRIX) IM injection 0 5 mL (has no administration in time range)       Diagnostic Studies  Results Reviewed     None                 No orders to display              Procedures  Procedures         ED Course                               SBIRT 22yo+    Flowsheet Row Most Recent Value   Initial Alcohol Screen: US AUDIT-C     1  How often do you have a drink containing alcohol? 0 Filed at: 04/23/2023 1117   2  How many drinks containing alcohol do you have on a typical day you are drinking? 0 Filed at: 04/23/2023 1117   3b  FEMALE Any Age, or MALE 65+: How often do you have 4 or more drinks on one occassion? 0 Filed at: 04/23/2023 1117   Audit-C Score 0 Filed at: 04/23/2023 1117                    Medical Decision Making  I obtained history from the patient  Patient with exam features consistent with cellulitis  Denies constitutional symptom  Well-appearing with normal vital signs  Treated with single dose of ancef in ED, provided with prescription for Augmentin, discharged with return precautions  Risk  Prescription drug management  Disposition  Final diagnoses:   Cellulitis   Cat bite, initial encounter     Time reflects when diagnosis was documented in both MDM as applicable and the Disposition within this note     Time User Action Codes Description Comment    4/23/2023 12:43 PM Flynn Colón Add [L03 90] Cellulitis     4/23/2023 12:43 PM Flynn Colón Add [W55 01XA] Cat bite, initial encounter       ED Disposition     ED Disposition   Discharge    Condition   Stable    Date/Time   Sun Apr 23, 2023 12:43 PM    Comment   Nancy Caban discharge to home/self care  Follow-up Information     Follow up With Specialties Details Why Contact Info Additional Information    [address] [facility] Emergency Department Emergency Medicine  If symptoms worsen [address] [facility] [address]  [address] [address] Emergency Department, [place], [place], [address]          Patient's Medications   Discharge Prescriptions    AMOXICILLIN-CLAVULANATE (AUGMENTIN) 875-125 MG PER TABLET    Take 1 tablet by mouth every 12 (twelve) hours for 10 days       Start Date: 4/23/2023 End Date: 5/3/2023       Order Dose: 1 tablet       Quantity: 14 tablet    Refills: 0       No discharge procedures on file      PDMP Review     None          ED Provider  Electronically Signed by           Amanda Hernandez MD  04/23/23 1969

## 2023-04-23 NOTE — DISCHARGE INSTRUCTIONS
Use the prescribed augmentin for 10 days  Continue symptomatic pain management  If you have any severe worsening of pain, fevers, chills, fatigue, return to the emergency department for reassessment  It can take about 2 days for infection to start is not responding to antibiotics

## 2023-09-06 ENCOUNTER — APPOINTMENT (EMERGENCY)
Dept: RADIOLOGY | Facility: HOSPITAL | Age: 63
End: 2023-09-06
Payer: COMMERCIAL

## 2023-09-06 ENCOUNTER — HOSPITAL ENCOUNTER (EMERGENCY)
Facility: HOSPITAL | Age: 63
Discharge: HOME | End: 2023-09-06
Attending: EMERGENCY MEDICINE
Payer: COMMERCIAL

## 2023-09-06 VITALS
HEART RATE: 76 BPM | WEIGHT: 170 LBS | DIASTOLIC BLOOD PRESSURE: 73 MMHG | TEMPERATURE: 98.1 F | SYSTOLIC BLOOD PRESSURE: 119 MMHG | BODY MASS INDEX: 29.18 KG/M2 | RESPIRATION RATE: 15 BRPM | OXYGEN SATURATION: 93 %

## 2023-09-06 DIAGNOSIS — S83.006A PATELLAR DISLOCATION: Primary | ICD-10-CM

## 2023-09-06 PROCEDURE — 27560 TREAT KNEECAP DISLOCATION: CPT | Performed by: EMERGENCY MEDICINE

## 2023-09-06 PROCEDURE — 96376 TX/PRO/DX INJ SAME DRUG ADON: CPT

## 2023-09-06 PROCEDURE — 99152 MOD SED SAME PHYS/QHP 5/>YRS: CPT | Performed by: EMERGENCY MEDICINE

## 2023-09-06 PROCEDURE — 73560 X-RAY EXAM OF KNEE 1 OR 2: CPT

## 2023-09-06 PROCEDURE — 99283 EMERGENCY DEPT VISIT LOW MDM: CPT

## 2023-09-06 PROCEDURE — 96374 THER/PROPH/DIAG INJ IV PUSH: CPT

## 2023-09-06 PROCEDURE — 99284 EMERGENCY DEPT VISIT MOD MDM: CPT | Performed by: EMERGENCY MEDICINE

## 2023-09-06 RX ORDER — KETAMINE HCL IN NACL, ISO-OSM 100MG/10ML
1.5 SYRINGE (ML) INJECTION ONCE
Status: COMPLETED | OUTPATIENT
Start: 2023-09-06 | End: 2023-09-06

## 2023-09-06 RX ORDER — FENTANYL CITRATE 50 UG/ML
75 INJECTION, SOLUTION INTRAMUSCULAR; INTRAVENOUS ONCE
Status: DISCONTINUED | OUTPATIENT
Start: 2023-09-06 | End: 2023-09-06

## 2023-09-06 RX ORDER — ONDANSETRON 2 MG/ML
INJECTION INTRAMUSCULAR; INTRAVENOUS
Status: COMPLETED
Start: 2023-09-06 | End: 2023-09-06

## 2023-09-06 RX ORDER — ONDANSETRON 2 MG/ML
4 INJECTION INTRAMUSCULAR; INTRAVENOUS ONCE
Status: COMPLETED | OUTPATIENT
Start: 2023-09-06 | End: 2023-09-06

## 2023-09-06 RX ORDER — FENTANYL CITRATE 50 UG/ML
75 INJECTION, SOLUTION INTRAMUSCULAR; INTRAVENOUS ONCE
Status: COMPLETED | OUTPATIENT
Start: 2023-09-06 | End: 2023-09-06

## 2023-09-06 RX ADMIN — Medication 116 MG: at 05:44

## 2023-09-06 RX ADMIN — ONDANSETRON 4 MG: 2 INJECTION INTRAMUSCULAR; INTRAVENOUS at 06:27

## 2023-09-06 RX ADMIN — ONDANSETRON 4 MG: 2 INJECTION INTRAMUSCULAR; INTRAVENOUS at 05:44

## 2023-09-06 RX ADMIN — FENTANYL CITRATE 75 MCG: 50 INJECTION, SOLUTION INTRAMUSCULAR; INTRAVENOUS at 05:20

## 2023-09-06 NOTE — Clinical Note
Luisa De Santiago was seen and treated in our emergency department on 9/6/2023. No work until cleared by Family Doctor/Orthopedics        Diagnosis:     Oval Edgar  . She may return on this date: If you have any questions or concerns, please don't hesitate to call.       Nada Ormond, RN    ______________________________           _______________          _______________  [facility] Representative                              Date                                Time

## 2023-09-06 NOTE — Clinical Note
Ann Avelar was seen and treated in our emergency department on 9/6/2023. No work until cleared by Family Doctor/Orthopedics        Diagnosis:     Appletonneema Casper  . She may return on this date: If you have any questions or concerns, please don't hesitate to call.       Vincent Dykes RN    ______________________________           _______________          _______________  [facility] Representative                              Date                                Time

## 2023-09-06 NOTE — ED NOTES
Unable to obtain a walker at this time. As per the , PT/OT may be in around 0800 to possibly assist with finding a walker for the patient. Charge RN aware.       [address], [address] [place] [address]  09/06/23 8252

## 2023-09-06 NOTE — Clinical Note
Thomas Hopper was seen and treated in our emergency department on 9/6/2023. No work until cleared by Family Doctor/Orthopedics        Diagnosis:     Corina Hollins  . She may return on this date: If you have any questions or concerns, please don't hesitate to call.       Raheem Jones RN    ______________________________           _______________          _______________  [facility] Representative                              Date                                Time

## 2023-09-06 NOTE — ED PROVIDER NOTES
Final Diagnosis:  1. Patellar dislocation        Chief Complaint   Patient presents with   • Knee Pain     Pt attempting to get into bed and twisted. Left knee pain. Has had previous injury years prior. HPI  Patient presents with deformity to her knee. She has a apparent patellar dislocation with lateral dislocation of her left patella. She woke up to go to the bathroom when when she was leaning back down to go to bed she a sudden pop and noticed patellar dislocation. She has had this happen before about 15 years ago. She had to have it reduced at that time she was not able to. It sounds like she may have had a couple of episodes where she has been able to self reduce this. She is neurovascularly intact. Her knee is held in flexion as well as her hip. I attempt to do a quick patellar reduction and she is transferred over from the stretcher but she is not able to tolerate this and is screaming we proceed with to sedate her with ketamine and then the patella pops over into its location about 5 seconds later. EMS reports if applicable: patient w/ knee deformity and has had prior patellar dislocations. Called by daughter.     - Previous charting underwent limited review with attention to last ED visits, labs, ekgs, and prior imaging.   Chart review reveals :     Admission on 01/23/2023, Discharged on 01/23/2023   Component Date Value Ref Range Status   • WBC 01/23/2023 8.13  4.31 - 10.16 Thousand/uL Final   • RBC 01/23/2023 5.63 (H)  3.81 - 5.12 Million/uL Final   • Hemoglobin 01/23/2023 16.5 (H)  11.5 - 15.4 g/dL Final   • Hematocrit 01/23/2023 48.5 (H)  34.8 - 46.1 % Final   • MCV 01/23/2023 86  82 - 98 fL Final   • MCH 01/23/2023 29.3  26.8 - 34.3 pg Final   • MCHC 01/23/2023 34.0  31.4 - 37.4 g/dL Final   • RDW 01/23/2023 13.0  11.6 - 15.1 % Final   • MPV 01/23/2023 9.3  8.9 - 12.7 fL Final   • Platelets 63/08/9766 269  149 - 390 Thousands/uL Final   • nRBC 01/23/2023 0  /100 WBCs Final   • Neutrophils Relative 01/23/2023 87 (H)  43 - 75 % Final   • Immat GRANS % 01/23/2023 0  0 - 2 % Final   • Lymphocytes Relative 01/23/2023 9 (L)  14 - 44 % Final   • Monocytes Relative 01/23/2023 4  4 - 12 % Final   • Eosinophils Relative 01/23/2023 0  0 - 6 % Final   • Basophils Relative 01/23/2023 0  0 - 1 % Final   • Neutrophils Absolute 01/23/2023 6.97  1.85 - 7.62 Thousands/µL Final   • Immature Grans Absolute 01/23/2023 0.02  0.00 - 0.20 Thousand/uL Final   • Lymphocytes Absolute 01/23/2023 0.74  0.60 - 4.47 Thousands/µL Final   • Monocytes Absolute 01/23/2023 0.35  0.17 - 1.22 Thousand/µL Final   • Eosinophils Absolute 01/23/2023 0.02  0.00 - 0.61 Thousand/µL Final   • Basophils Absolute 01/23/2023 0.03  0.00 - 0.10 Thousands/µL Final   • Sodium 01/23/2023 143  135 - 147 mmol/L Final   • Potassium 01/23/2023 3.5  3.5 - 5.3 mmol/L Final   • Chloride 01/23/2023 103  96 - 108 mmol/L Final   • CO2 01/23/2023 25  21 - 32 mmol/L Final   • ANION GAP 01/23/2023 15 (H)  4 - 13 mmol/L Final   • BUN 01/23/2023 15  5 - 25 mg/dL Final   • Creatinine 01/23/2023 1.08  0.60 - 1.30 mg/dL Final    Standardized to IDMS reference method   • Glucose 01/23/2023 155 (H)  65 - 140 mg/dL Final    If the patient is fasting, the ADA then defines impaired fasting glucose as > 100 mg/dL and diabetes as > or equal to 123 mg/dL. Specimen collection should occur prior to Sulfasalazine administration due to the potential for falsely depressed results. Specimen collection should occur prior to Sulfapyridine administration due to the potential for falsely elevated results. • Calcium 01/23/2023 9.4  8.3 - 10.1 mg/dL Final   • AST 01/23/2023 37  5 - 45 U/L Final    Specimen collection should occur prior to Sulfasalazine administration due to the potential for falsely depressed results.     • ALT 01/23/2023 37  12 - 78 U/L Final    Specimen collection should occur prior to Sulfasalazine administration due to the potential for falsely depressed results. • Alkaline Phosphatase 01/23/2023 109  46 - 116 U/L Final   • Total Protein 01/23/2023 7.3  6.4 - 8.4 g/dL Final   • Albumin 01/23/2023 4.0  3.5 - 5.0 g/dL Final   • Total Bilirubin 01/23/2023 0.71  0.20 - 1.00 mg/dL Final    Use of this assay is not recommended for patients undergoing treatment with eltrombopag due to the potential for falsely elevated results. • eGFR 01/23/2023 54  ml/min/1.73sq m Final   • Lipase 01/23/2023 92  73 - 393 u/L Final   • Color, UA 01/23/2023 Yellow   Final   • Clarity, UA 01/23/2023 Clear   Final   • Specific Gravity, UA 01/23/2023 1.015  1.000 - 1.030 Final   • pH, UA 01/23/2023 8.5  5.0, 5.5, 6.0, 6.5, 7.0, 7.5, 8.0, 8.5, 9.0 Final   • Leukocytes, UA 01/23/2023 Negative  Negative Final   • Nitrite, UA 01/23/2023 Negative  Negative Final   • Protein, UA 01/23/2023 Trace (A)  Negative mg/dl Final   • Glucose, UA 01/23/2023 Negative  Negative mg/dl Final   • Ketones, UA 01/23/2023 >=80 (3+) (A)  Negative mg/dl Final   • Urobilinogen, UA 01/23/2023 0.2  0.2, 1.0 E.U./dl E.U./dl Final   • Bilirubin, UA 01/23/2023 Small (A)  Negative Final   • Occult Blood, UA 01/23/2023 Negative  Negative Final   • RBC, UA 01/23/2023 None Seen  None Seen, 0-1, 1-2, 2-4, 0-5 /hpf Final   • WBC, UA 01/23/2023 0-1  None Seen, 0-1, 1-2, 0-5, 2-4 /hpf Final   • Epithelial Cells 01/23/2023 Occasional  None Seen, Occasional /hpf Final   • Bacteria, UA 01/23/2023 Occasional  None Seen, Occasional /hpf Final   • Hyaline Casts, UA 01/23/2023 0-1 (A)  (none) /lpf Final   • MUCUS THREADS 01/23/2023 Occasional (A)  None Seen Final       - No language barrier.   - History obtained from patient . - There are no limitations to the history obtained. - Discuss patient's care, with patient permission or by chart review, with      PMH:   has a past medical history of Arthritis, Disease of thyroid gland, and History of transfusion.     PSH:   has a past surgical history that includes Hernia repair; Appendectomy; and Oophorectomy (Right). Social History:  Tobacco Use: Medium Risk (9/6/2023)    Patient History    • Smoking Tobacco Use: Former    • Smokeless Tobacco Use: Never    • Passive Exposure: Not on file     Alcohol Use: Not At Risk (10/21/2019)    AUDIT-C    • Frequency of Alcohol Consumption: Never    • Average Number of Drinks: Not on file    • Frequency of Binge Drinking: Not on file     No illicit use       ROS:  Pertinent positives/negatives: Carla Sor Some ROS may be present in the HPI and would take precedent over these standard questions asked below. Review of Systems   Musculoskeletal: Positive for arthralgias, gait problem and joint swelling. CONSTITUTIONAL:  No lethargy. No weakness. No unexpected weight loss. No appetite change. EYES:  No pain, redness, or discharge. No loss of vision. No orbital trauma or pain. ENT:  No tinnitus or decreased hearing. No epistaxis/purulent rhinorrhea. No voice change, airway closing, trismus. CARDIOVASCULAR:  No chest pain. No skin mottling or pallor. No change in exertional capacity  RESPIRATORY:  No hemoptysis. No paroxysmal nocturnal dyspnea. No stridor. No audible wheezing. No production with cough. GASTROINTESTINAL:  Normal appetite. No vomiting, diarrhea. No pain. No bloating. No melena. No hematochezia. GENITOURINARY:  No frequency, urgency, nocturia. No hematuria or dysuria. No discharge. No sores/adenopathy. MUSCULOSKELETAL:  No arthralgias or myalgias that are new. No new deformity. INTEGUMENTARY:  No swelling. No unexpected contusions. No abrasions. No lymphangitis. NEUROLOGIC:  No meningismus. No new numbness of the extremities. No new focal weakness. No postural instability  PSYCHIATRIC:  No SI HI AVH  HEMATOLOGICAL:  No bleeding. No petechiae. No bruising. ALLERGIES:  No urticaria. No sudden abd cramping. No stridor.     PE:     Physical exam highlights:   Physical Exam       Vitals:    09/06/23 0601 09/06/23 0615 09/06/23 0630 09/06/23 0645   BP: 143/92 131/67 122/81 124/76   BP Location:  Right arm Right arm Right arm   Pulse: 100 76 74 82   Resp: 18 17 16 16   Temp:       TempSrc:       SpO2: 98% 97% 97% 97%   Weight:         Vitals reviewed by me. Nursing note reviewed  Chaperone present for all sensitive exam.  Const: No acute distress. Alert. Nontoxic. Not diaphoretic. HEENT: External ears normal. No protrusion drainage swelling. Nose normal. No drainage/traumatic deformity. MMM. Mouth with baseline/symmetric movement. No trismus. Eyes: No squinting. No icterus. No tearing/swelling/drainage. Tracks through the room with normal EOM. Neck: ROM normal. No rigidity. No meningismus. Cards: Rate as per vitals Compared to monitor sinus unless documented. Regular Well perfused. Pulm: able to verbalize without additional effort. Effort and excursion normal. No distress. No audible wheezing/ stridor. Normal resp rate without retraction or change in work of breathing. Abd: No distension beyond baseline. No fluctuant wave. Patient without peritoneal pain with shifting/bumping the bed. MSK: ROM normal baseline. Laterally displaced left patella. No contractures from baseline. Skin: No new rashes visible. Well perfused. No wounds visualized on exposed skin  Neuro: Nonfocal. Baseline. CN grossly intact. Moving all four with coordination. Psych: Normal behavior and affect. A:  - Nursing note reviewed.     Ddx and MDM  Considered diagnoses    Patellar dislocation  Attempt reduction w/ just pain meds  Unable to tolerate attempt  Ketamine sedation  Reduced promptly    xr r/o underlying assoc fracture of femur / other  None I can appreciate on prelim read  Knee immobilizer  Walker  Ortho f/u 1 weeks         My conversation with consultant reveals: NA       Decision rules:                           My read of the XR/CT scan reveals:  NA   XR knee 1 or 2 vw left   ED Interpretation   I don't see fracture assoc w/ the patellar reduction we just did          Orders Placed This Encounter   Procedures   • XR knee 1 or 2 vw left   • Knee Immobilizer   • Nursing Communication walker     Labs Reviewed - No data to display    *Each of these labs was reviewed. Particular standout labs will be noted in the ED Course above     Final Diagnosis:  1. Patellar dislocation          P:  - hospital tx includes   Medications   fentanyl citrate (PF) 100 MCG/2ML 75 mcg (75 mcg Intravenous Given 9/6/23 0520)   ondansetron (ZOFRAN) injection 4 mg (4 mg Intravenous Given 9/6/23 0544)   Ketamine HCl 116 mg (116 mg Intravenous Given 9/6/23 0544)   ondansetron (ZOFRAN) injection 4 mg (4 mg Intravenous Given 9/6/23 8090)         - dispositio  Time reflects when diagnosis was documented in both MDM as applicable and the Disposition within this note     Time User Action Codes Description Comment    9/6/2023  5:17 AM Laverne Huffman Add [N99.622I] Patellar dislocation       ED Disposition     ED Disposition   Discharge    Condition   Stable    Date/Time   Wed Sep 6, 2023  6:02 AM    Susie Nunez [facility] discharge to home/self care. Follow-up Information     Follow up With Specialties Details Why Contact Info Additional [address] [facility] Orthopedic Surgery   [address] [facility] [address] [phone] [address] [facility], [facility], [address] [phone]          - patient will call their PCP to let them know they were in the emergency department. We discuss return precautions and patient is agreeable with plan and aformentioned disposition.        - additional treatment intended, if consistent with primary provider:  - patient to follow with :      Current Discharge Medication List      CONTINUE these medications which have NOT CHANGED    Details   acetaminophen (TYLENOL) 325 mg tablet Take 2 tablets (650 mg total) by mouth every 6 (six) hours as needed for mild pain, headaches or fever  Qty: 30 tablet, Refills: 0    Associated Diagnoses: Pneumonia of right lower lobe due to infectious organism      cyclobenzaprine (FLEXERIL) 10 mg tablet Take 10 mg by mouth 3 (three) times a day as needed for muscle spasms      dextromethorphan-guaiFENesin (ROBITUSSIN DM)  mg/5 mL syrup Take 10 mL by mouth 3 (three) times a day as needed for cough  Qty: 118 mL, Refills: 0    Associated Diagnoses: Pneumonia of right lower lobe due to infectious organism      diazepam (VALIUM) 2 mg tablet Take 2 mg by mouth every 6 (six) hours as needed for anxiety      dicyclomine (BENTYL) 20 mg tablet Take 1 tablet (20 mg total) by mouth 2 (two) times a day  Qty: 20 tablet, Refills: 0    Associated Diagnoses: Colitis      famotidine (PEPCID) 20 mg tablet Take 1 tablet (20 mg total) by mouth daily  Qty: 30 tablet, Refills: 0    Associated Diagnoses: Colitis      HYDROcodone-acetaminophen (NORCO) 7.5-325 mg per tablet Take 1 tablet by mouth every 8 (eight) hours as needed for pain      ondansetron (ZOFRAN) 4 mg tablet Take 1 tablet (4 mg total) by mouth every 6 (six) hours  Qty: 12 tablet, Refills: 0    Associated Diagnoses: Colitis      oxyCODONE-acetaminophen (PERCOCET) 5-325 mg per tablet Take 1 tablet by mouth every 4 (four) hours as needed for moderate pain           No discharge procedures on file. Prior to Admission Medications   Prescriptions Last Dose Informant Patient Reported? Taking?    HYDROcodone-acetaminophen (NORCO) 7.5-325 mg per tablet  Self Yes No   Sig: Take 1 tablet by mouth every 8 (eight) hours as needed for pain   acetaminophen (TYLENOL) 325 mg tablet   No No   Sig: Take 2 tablets (650 mg total) by mouth every 6 (six) hours as needed for mild pain, headaches or fever   Patient not taking: Reported on 7/2/2022   cyclobenzaprine (FLEXERIL) 10 mg tablet   Yes No   Sig: Take 10 mg by mouth 3 (three) times a day as needed for muscle spasms   Patient not taking: Reported on 7/2/2022   dextromethorphan-guaiFENesin (ROBITUSSIN DM)  mg/5 mL syrup   No No   Sig: Take 10 mL by mouth 3 (three) times a day as needed for cough   Patient not taking: Reported on 7/2/2022   diazepam (VALIUM) 2 mg tablet   Yes No   Sig: Take 2 mg by mouth every 6 (six) hours as needed for anxiety   Patient not taking: Reported on 7/2/2022   dicyclomine (BENTYL) 20 mg tablet   No No   Sig: Take 1 tablet (20 mg total) by mouth 2 (two) times a day   Patient not taking: Reported on 4/23/2023   famotidine (PEPCID) 20 mg tablet   No No   Sig: Take 1 tablet (20 mg total) by mouth daily   Patient not taking: Reported on 4/23/2023   ondansetron (ZOFRAN) 4 mg tablet   No No   Sig: Take 1 tablet (4 mg total) by mouth every 6 (six) hours   Patient not taking: Reported on 4/23/2023   oxyCODONE-acetaminophen (PERCOCET) 5-325 mg per tablet   Yes No   Sig: Take 1 tablet by mouth every 4 (four) hours as needed for moderate pain   Patient not taking: Reported on 4/23/2023      Facility-Administered Medications: None       Portions of the record may have been created with voice recognition software. Occasional wrong word or "sound a like" substitutions may have occurred due to the inherent limitations of voice recognition software. Read the chart carefully and recognize, using context, where substitutions have occurred.     Electronically signed by:  MD Jerry Manuel MD  09/06/23 6152

## 2023-09-06 NOTE — ED PROCEDURE NOTE
PROCEDURE  Pre-Procedural Sedation    Performed by: Donnamae Halsted, MD  Authorized by: Donnamae Halsted, MD    Consent:     Consent obtained:  Verbal    Consent given by:  Patient    Risks discussed:   Allergic reaction, dysrhythmia, nausea, vomiting and respiratory compromise necessitating ventilatory assistance and intubation  Universal protocol:     Procedure explained and questions answered to patient or proxy's satisfaction: yes      Relevant documents present and verified: yes      Patient identity confirmation method:  Verbally with patient, arm band and hospital-assigned identification number  Indications:     Sedation purpose:  Dislocation reduction    Procedure necessitating sedation performed by:  Physician performing sedation    Intended level of sedation:  Moderate (conscious sedation)  Pre-sedation assessment:     NPO status caution: unable to specify NPO status      ASA classification: class 2 - patient with mild systemic disease      Neck mobility: normal      Mouth opening:  3 or more finger widths    Mallampati score:  I - soft palate, uvula, fauces, pillars visible    Pre-sedation assessments completed and reviewed: airway patency, cardiovascular function, mental status, nausea/vomiting, pain level, respiratory function and temperature    Procedural Sedation    Date/Time: 9/6/2023 7:02 AM    Performed by: Donnamae Halsted, MD  Authorized by: Donnamae Halsted, MD    Immediate pre-procedure details:     Reassessment: Patient reassessed immediately prior to procedure      Reviewed: vital signs      Verified: bag valve mask available, emergency equipment available, intubation equipment available, IV patency confirmed and oxygen available    Procedure details (see MAR for exact dosages):     Preoxygenation:  Nasal cannula    Sedation:  Ketamine    Analgesia:  Fentanyl    Intra-procedure monitoring:  Cardiac monitor, blood pressure monitoring, continuous pulse oximetry, frequent LOC assessments and frequent vital sign checks    Intra-procedure management:  Supplemental oxygen    Sedation end time:  9/6/2023 7:03 AM    Total sedation time (minutes):  65  Post-procedure details:     Post-sedation assessment completed:  9/6/2023 7:03 AM    Attendance: Constant attendance by certified staff until patient recovered      Recovery: Patient returned to pre-procedure baseline      Post-sedation assessments completed and reviewed: airway patency, cardiovascular function, hydration status, mental status, nausea/vomiting, pain level, respiratory function and temperature      Patient is stable for discharge or admission: yes      Patient tolerance: Tolerated well, no immediate complications  Orthopedic injury treatment    Date/Time: 9/6/2023 7:03 AM    Performed by: Gwendolyn Cannon MD  Authorized by: Gwendolyn Cannon MD    Patient Location:  ED  [place] Protocol:  Procedure performed by:  Consent: Verbal consent obtained.   Consent given by: patient  Patient understanding: patient states understanding of the procedure being performed  Patient consent: the patient's understanding of the procedure matches consent given  Patient identity confirmed: verbally with patient, arm band and hospital-assigned identification number      Injury location:  Knee  Location details:  Left knee  Injury type:  Dislocation  Dislocation type: lateral patellar    Neurovascular status: Neurovascularly intact    Distal perfusion: normal    Neurological function: normal    Range of motion: reduced    Local anesthesia used?: No    General anesthesia used?: Yes    Manipulation performed?: Yes    Reduction method:  Direct traction  Reduction method:  Direct traction  Reduction method:  Direct traction  Reduction method:  Direct traction  Reduction method:  Direct traction  Reduction method:  Direct traction  Reduction successful?: Yes    Confirmation: Reduction confirmed by x-ray    Immobilization:  Knee immobilizer  Neurovascular status: Neurovascularly intact    Distal perfusion: normal    Neurological function: normal    Range of motion: normal    Patient tolerance:  Patient tolerated the procedure well with no immediate complications         Mustapha Castro MD  09/06/23 6324

## 2023-09-14 ENCOUNTER — OFFICE VISIT (OUTPATIENT)
Dept: OBGYN CLINIC | Facility: CLINIC | Age: 63
End: 2023-09-14
Payer: COMMERCIAL

## 2023-09-14 ENCOUNTER — APPOINTMENT (OUTPATIENT)
Dept: RADIOLOGY | Facility: CLINIC | Age: 63
End: 2023-09-14
Payer: COMMERCIAL

## 2023-09-14 VITALS
BODY MASS INDEX: 29.02 KG/M2 | WEIGHT: 170 LBS | DIASTOLIC BLOOD PRESSURE: 79 MMHG | HEART RATE: 78 BPM | SYSTOLIC BLOOD PRESSURE: 129 MMHG | HEIGHT: 64 IN

## 2023-09-14 DIAGNOSIS — M25.562 CHRONIC PAIN OF LEFT KNEE: ICD-10-CM

## 2023-09-14 DIAGNOSIS — M25.861 PATELLOFEMORAL DYSFUNCTION OF RIGHT KNEE: ICD-10-CM

## 2023-09-14 DIAGNOSIS — M22.02 RECURRENT DISLOCATION OF LEFT PATELLA: Primary | ICD-10-CM

## 2023-09-14 DIAGNOSIS — S83.005A DISLOCATION OF LEFT PATELLA, INITIAL ENCOUNTER: ICD-10-CM

## 2023-09-14 DIAGNOSIS — G89.29 CHRONIC PAIN OF LEFT KNEE: ICD-10-CM

## 2023-09-14 PROCEDURE — 73562 X-RAY EXAM OF KNEE 3: CPT

## 2023-09-14 PROCEDURE — 99204 OFFICE O/P NEW MOD 45 MIN: CPT | Performed by: ORTHOPAEDIC SURGERY

## 2023-09-14 NOTE — PROGRESS NOTES
Assessment/Plan:  1. Dislocation of left patella, initial encounter  XR knee 3 vw left non injury    Ambulatory Referral to Orthopedic Surgery    Brace    CANCELED: XR knee 3 vw left non injury      2. Patellofemoral dysfunction of right knee  Brace        Scribe Attestation    I,:  Mansoor Benavidez am acting as a scribe while in the presence of the attending physician.:       I,:  Vin Cornejo, DO personally performed the services described in this documentation    as scribed in my presence.:         Aamda Kelly is a pleasant 33-year-old female who presents today for initial evaluation of her left knee patellar dislocation. After reviewing her imaging and performing a thorough history and physical exam I explained that she suffered a dislocation of her left patella. Based on her history, this sounds like a recurrent problem for her that has been refractory to bracing and physical therapy. There is minimal degenerative change in her knee and surgical intervention in the form of MPFL reconstruction may be indicated. I recommended a consult evaluation with my partner Dr. Simone So for his specialty and orthopedic sports medicine. She was fit today with bilateral J braces for her knees for use with activity. She may remain out of work for 1 week until her evaluation with Dr. Simone So. A note was provided for her employer today. All of her questions and concerns were addressed today. We will see her back as needed. Subjective: Initial evaluation for left knee patellar dislocation    Patient ID: Sadie Dejesus is a 70 y.o. female who presents today for initial evaluation of her left knee patellar dislocation. This occurred on 9/6/2023. She was evaluated in the emergency department where the patella was reduced under sedation. At today's visit, she reports that she has suffered with left knee patellar dislocations since she was a child. 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   She reports that the patella generally reduces on its own, however she has had a few occasions where reduction in the hospital was necessary, most recently 1 week ago. She was placed into a knee immobilizer. She also reports she has been using a right knee brace as she feels that her right patella may also dislocate, however this has never actually occurred. She reports that she has participated in physical therapy in the past for this issue which did not mitigate her dislocation events. She has not returned to work since her dislocation event. She does have sedentary job duties but works in [employer] which requires commute. Review of Systems   Constitutional: Positive for activity change. Negative for chills, fever and unexpected weight change. HENT: Negative for hearing loss, nosebleeds and sore throat. Eyes: Negative for pain, redness and visual disturbance. Respiratory: Negative for cough, shortness of breath and wheezing. Cardiovascular: Negative for chest pain, palpitations and leg swelling. Gastrointestinal: Negative for abdominal pain, nausea and vomiting. Endocrine: Negative for polydipsia and polyuria. Genitourinary: Negative for dysuria and hematuria. Musculoskeletal: Positive for arthralgias and myalgias. Negative for joint swelling. See HPI   Skin: Negative for rash and wound. Neurological: Negative for dizziness, numbness and headaches. Psychiatric/Behavioral: Negative for decreased concentration and suicidal ideas. The patient is not nervous/anxious. Past Medical History:   Diagnosis Date   • Arthritis    • Disease of thyroid gland    • History of transfusion        Past Surgical History:   Procedure Laterality Date   • APPENDECTOMY     • HERNIA REPAIR     • OOPHORECTOMY Right        History reviewed. No pertinent family history.     Social History     Occupational History   • Not on file   Tobacco Use   • Smoking status: Former     Types: Cigarettes     Quit date: 34     Years since quittin.7   • Smokeless tobacco: Never   Vaping Use   • Vaping Use: Never used   Substance and Sexual Activity   • Alcohol use: Never     Alcohol/week: 0.0 standard drinks of alcohol   • Drug use: Not Currently     Types: Marijuana   • Sexual activity: Not Currently         Current Outpatient Medications:   •  acetaminophen (TYLENOL) 325 mg tablet, Take 2 tablets (650 mg total) by mouth every 6 (six) hours as needed for mild pain, headaches or fever (Patient not taking: Reported on 2022), Disp: 30 tablet, Rfl: 0  •  cyclobenzaprine (FLEXERIL) 10 mg tablet, Take 10 mg by mouth 3 (three) times a day as needed for muscle spasms (Patient not taking: Reported on 2022), Disp: , Rfl:   •  dextromethorphan-guaiFENesin (ROBITUSSIN DM)  mg/5 mL syrup, Take 10 mL by mouth 3 (three) times a day as needed for cough (Patient not taking: Reported on 2022), Disp: 118 mL, Rfl: 0  •  diazepam (VALIUM) 2 mg tablet, Take 2 mg by mouth every 6 (six) hours as needed for anxiety (Patient not taking: Reported on 2022), Disp: , Rfl:   •  dicyclomine (BENTYL) 20 mg tablet, Take 1 tablet (20 mg total) by mouth 2 (two) times a day (Patient not taking: Reported on 2023), Disp: 20 tablet, Rfl: 0  •  famotidine (PEPCID) 20 mg tablet, Take 1 tablet (20 mg total) by mouth daily (Patient not taking: Reported on 2023), Disp: 30 tablet, Rfl: 0  •  HYDROcodone-acetaminophen (NORCO) 7.5-325 mg per tablet, Take 1 tablet by mouth every 8 (eight) hours as needed for pain, Disp: , Rfl:   •  ondansetron (ZOFRAN) 4 mg tablet, Take 1 tablet (4 mg total) by mouth every 6 (six) hours (Patient not taking: Reported on 2023), Disp: 12 tablet, Rfl: 0  •  oxyCODONE-acetaminophen (PERCOCET) 5-325 mg per tablet, Take 1 tablet by mouth every 4 (four) hours as needed for moderate pain (Patient not taking: Reported on 2023), Disp: , Rfl:     No Known Allergies    Objective:  Vitals:    23 0817 BP: 129/79   Pulse: 78       Body mass index is 29.18 kg/m². Left Knee Exam     Tenderness   The patient is experiencing tenderness in the medial retinaculum and patella. Range of Motion   Extension: 0   Flexion: 120     Tests   Varus: negative Valgus: negative  Drawer:  Anterior - negative     Posterior - negative  Patellar apprehension: positive    Other   Erythema: absent  Scars: absent  Sensation: normal  Pulse: present  Swelling: none  Effusion: no effusion present          Observations   Left Knee   Negative for effusion. Physical Exam  Vitals and nursing note reviewed. Constitutional:       Appearance: Normal appearance. She is well-developed. HENT:      Head: Normocephalic and atraumatic. Right Ear: External ear normal.      Left Ear: External ear normal.   Eyes:      General: No scleral icterus. Extraocular Movements: Extraocular movements intact. Conjunctiva/sclera: Conjunctivae normal.   Cardiovascular:      Rate and Rhythm: Normal rate. Pulmonary:      Effort: Pulmonary effort is normal. No respiratory distress. Musculoskeletal:      Cervical back: Normal range of motion and neck supple. Left knee: No effusion. Comments: See Ortho exam   Skin:     General: Skin is warm and dry. Neurological:      General: No focal deficit present. Mental Status: She is alert and oriented to person, place, and time. Psychiatric:         Behavior: Behavior normal.         I have personally reviewed pertinent films in PACS. X-ray of the left knee obtained on 9/14/2023 reviewed demonstrating no acute fracture, dislocation, lytic or blastic lesion. There is minimal degenerative change. There is lateral tilt of the patella noted. This document was created using speech voice recognition software.    Grammatical errors, random word insertions, pronoun errors, and incomplete sentences are an occasional consequence of this system due to software limitations, ambient noise, and hardware issues. Any formal questions or concerns about content, text, or information contained within the body of this dictation should be directly addressed to the provider for clarification.

## 2023-09-14 NOTE — LETTER
September 14, 2023     Patient: Enrique Alvarez  YOB: 1960  Date of Visit: 9/14/2023      To Whom it May Concern:    Enrique Alvarez is under my professional care. Lisa Thakkar was seen in my office on 9/14/2023. Lisa Thakkar should remain out of work until 9/21/2023. She may return without restriction at that time. She should be allowed to use her knee braces at work. If you have any questions or concerns, please don't hesitate to call.          Sincerely,          Danyel Caraballo DO        CC: No Recipients

## 2023-09-19 ENCOUNTER — OFFICE VISIT (OUTPATIENT)
Dept: OBGYN CLINIC | Facility: CLINIC | Age: 63
End: 2023-09-19
Payer: COMMERCIAL

## 2023-09-19 VITALS
HEIGHT: 64 IN | BODY MASS INDEX: 29.02 KG/M2 | DIASTOLIC BLOOD PRESSURE: 73 MMHG | SYSTOLIC BLOOD PRESSURE: 107 MMHG | HEART RATE: 78 BPM | WEIGHT: 170 LBS

## 2023-09-19 DIAGNOSIS — M22.02 RECURRENT DISLOCATION OF LEFT PATELLA: Primary | ICD-10-CM

## 2023-09-19 PROCEDURE — 99214 OFFICE O/P EST MOD 30 MIN: CPT | Performed by: ORTHOPAEDIC SURGERY

## 2023-09-19 NOTE — PROGRESS NOTES
Assessment/Plan:  1. Recurrent dislocation of left patella  Ambulatory Referral to Orthopedic Surgery    MRI knee left  wo contrast    Ambulatory Referral to Physical Therapy          58-year-old female with recurrent left patellar instability. We discussed her diagnosis today. We also discussed operative and nonoperative management. Discussed that she is older than most patients with patellar instability. At this time we will start with a MRI to better evaluate her MPFL, cartilage as well as TT-TG. She will also start with physical therapy to help strengthen her knee, hip, core as well as regained full range of motion. Plan to address bilateral knees. Continue wear the J braces. To see her back in about 4 weeks to review the MRI and check in on her progress. We will plan to get full-length alignment films at her next visit. Subjective:   Haile Barajas is a 70 y.o. female who presents left knee pain in the setting of left patellar dislocation. On 9/6/2023 she had gotten out of bed in the middle night as she twisted her leg left patella dislocated laterally. She was unable to self reduce it and needed to go to the emergency room for sedation and reduction. Has been wearing J braces for the last week. She has not done any physical therapy. She has a history of bilateral patellar instability and subluxations in the past.  On the left knee she has had 1 further dislocation which happened 13 years ago while she was living in [place]. This needed to be reduced in the emergency room. She saw an orthopedic surgeon at that time who did not talk to her about any surgical intervention. She had a history of intoeing as a child and needed bracing. She has had bilateral patellar instability since she was a teenager. She has what she calls recurrent subluxations. Of note she has left lower extremity swelling in her ankle that is chronic in nature.   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 Review of Systems   Constitutional: Negative. Negative for chills and fever. HENT: Negative. Negative for ear pain and sore throat. Eyes: Negative. Negative for pain and redness. Respiratory: Negative. Negative for shortness of breath and wheezing. Cardiovascular: Negative for chest pain and palpitations. Gastrointestinal: Negative. Negative for abdominal pain and blood in stool. Endocrine: Negative. Negative for polydipsia and polyuria. Genitourinary: Negative. Negative for difficulty urinating and dysuria. Musculoskeletal:        As noted in HPI   Skin: Negative. Negative for pallor and rash. Neurological: Negative. Negative for dizziness and numbness. Hematological: Negative. Negative for adenopathy. Does not bruise/bleed easily. Psychiatric/Behavioral: Negative. Negative for confusion and suicidal ideas. Past Medical History:   Diagnosis Date   • Arthritis    • Disease of thyroid gland    • History of transfusion        Past Surgical History:   Procedure Laterality Date   • APPENDECTOMY     • HERNIA REPAIR     • OOPHORECTOMY Right        History reviewed. No pertinent family history.     Social History     Occupational History   • Not on file   Tobacco Use   • Smoking status: Former     Types: Cigarettes     Quit date:      Years since quittin.7   • Smokeless tobacco: Never   Vaping Use   • Vaping Use: Never used   Substance and Sexual Activity   • Alcohol use: Never     Alcohol/week: 0.0 standard drinks of alcohol   • Drug use: Not Currently     Types: Marijuana   • Sexual activity: Not Currently         Current Outpatient Medications:   •  HYDROcodone-acetaminophen (NORCO) 7.5-325 mg per tablet, Take 1 tablet by mouth every 8 (eight) hours as needed for pain, Disp: , Rfl: •  ibuprofen (MOTRIN) 100 mg/5 mL suspension, Take by mouth if needed for mild pain, Disp: , Rfl:   •  acetaminophen (TYLENOL) 325 mg tablet, Take 2 tablets (650 mg total) by mouth every 6 (six) hours as needed for mild pain, headaches or fever (Patient not taking: Reported on 7/2/2022), Disp: 30 tablet, Rfl: 0  •  cyclobenzaprine (FLEXERIL) 10 mg tablet, Take 10 mg by mouth 3 (three) times a day as needed for muscle spasms (Patient not taking: Reported on 7/2/2022), Disp: , Rfl:   •  dextromethorphan-guaiFENesin (ROBITUSSIN DM)  mg/5 mL syrup, Take 10 mL by mouth 3 (three) times a day as needed for cough (Patient not taking: Reported on 7/2/2022), Disp: 118 mL, Rfl: 0  •  diazepam (VALIUM) 2 mg tablet, Take 2 mg by mouth every 6 (six) hours as needed for anxiety (Patient not taking: Reported on 7/2/2022), Disp: , Rfl:   •  dicyclomine (BENTYL) 20 mg tablet, Take 1 tablet (20 mg total) by mouth 2 (two) times a day (Patient not taking: Reported on 4/23/2023), Disp: 20 tablet, Rfl: 0  •  famotidine (PEPCID) 20 mg tablet, Take 1 tablet (20 mg total) by mouth daily (Patient not taking: Reported on 4/23/2023), Disp: 30 tablet, Rfl: 0  •  ondansetron (ZOFRAN) 4 mg tablet, Take 1 tablet (4 mg total) by mouth every 6 (six) hours (Patient not taking: Reported on 4/23/2023), Disp: 12 tablet, Rfl: 0  •  oxyCODONE-acetaminophen (PERCOCET) 5-325 mg per tablet, Take 1 tablet by mouth every 4 (four) hours as needed for moderate pain (Patient not taking: Reported on 4/23/2023), Disp: , Rfl:     No Known Allergies    Objective:  Vitals:    09/19/23 1321   BP: 107/73   Pulse: 78       Right Knee Exam     Range of Motion   Extension: 0   Flexion: 140     Tests   Nicolasa:  Medial - negative Lateral - negative  Varus: negative Valgus: negative  Lachman:  Anterior - negative      Drawer:  Anterior - negative    Posterior - negative  Pivot shift: negative  Patellar apprehension: positive (2+)    Other   Erythema: absent  Scars: absent  Sensation: normal  Pulse: present  Swelling: none  Effusion: no effusion present      Left Knee Exam     Range of Motion   Extension: 0   Flexion: 110     Tests   Nicolasa:  Medial - negative Lateral - negative  Varus: negative Valgus: negative  Lachman:  Anterior - negative      Drawer:  Anterior - negative     Posterior - negative  Pivot shift: negative  Patellar apprehension: positive (Guards on exam)    Other   Erythema: absent  Scars: absent  Sensation: normal  Pulse: present  Swelling: mild  Effusion: no effusion present    Comments:  Left lower extremity swelling          Observations   Left Knee   Negative for effusion. Right Knee   Negative for effusion. Physical Exam  Vitals and nursing note reviewed. Constitutional:       Appearance: She is well-developed. HENT:      Head: Normocephalic and atraumatic. Pulmonary:      Effort: Pulmonary effort is normal. No respiratory distress. Musculoskeletal:      Cervical back: Normal range of motion and neck supple. Right knee: No effusion. Instability Tests: Medial Nicolasa test negative and lateral Nicolasa test negative. Left knee: No effusion. Instability Tests: Medial Nicolasa test negative and lateral Nicolasa test negative. Skin:     Findings: No rash. Neurological:      Mental Status: She is alert and oriented to person, place, and time. Psychiatric:         Behavior: Behavior normal.         I have personally reviewed pertinent films in PACS and my interpretation is as follows:  Previous x-rays done on 9/14/2023 no significant osteoarthritis. No fractures or dislocations      This document was created using speech voice recognition software. Grammatical errors, random word insertions, pronoun errors, and incomplete sentences are an occasional consequence of this system due to software limitations, ambient noise, and hardware issues.    Any formal questions or concerns about content, text, or information contained within the body of this dictation should be directly addressed to the provider for clarification.

## 2023-09-28 ENCOUNTER — EVALUATION (OUTPATIENT)
Dept: PHYSICAL THERAPY | Facility: CLINIC | Age: 63
End: 2023-09-28
Payer: COMMERCIAL

## 2023-09-28 DIAGNOSIS — M22.02 RECURRENT DISLOCATION OF LEFT PATELLA: Primary | ICD-10-CM

## 2023-09-28 PROCEDURE — 97112 NEUROMUSCULAR REEDUCATION: CPT

## 2023-09-28 PROCEDURE — 97162 PT EVAL MOD COMPLEX 30 MIN: CPT

## 2023-09-28 NOTE — PROGRESS NOTES
PT Evaluation     Today's date: 2023  Patient name: Juan Pryor  : 1960  MRN: [id number]  Referring provider: Jen Rodriguez MD  Dx:   Encounter Diagnosis     ICD-10-CM    1. Recurrent dislocation of left patella  M22.02 Ambulatory Referral to Physical Therapy          Start Time:   Stop Time:   Total time in clinic (min): 48 minutes    Assessment  Assessment details: Pt is a pleasant 70 y.o. female who presents to [facility] with recurrent L patellar instability, most recently on 23. Today, she presents with weakness, decreased ROM, impaired balance, decreased endurance, decreased coordination, increased fall risk, new onset of impairment of functional mobility, pain, decreased activity tolerance and decreased strength. Functionally, she is limited in her ability to stand and ambulate, negotiate stairs, perform age appropriate recreation and exercise, and sleep through the night. She is motivated to improve. Pt will benefit from skilled PT to address the aforementioned deficits and limitations in an effort to maximize pain free functional mobility and overall quality of life. Progress as able with these goals in mind. Impairments: abnormal gait, abnormal muscle firing, abnormal muscle tone, abnormal or restricted ROM, abnormal movement, activity intolerance, impaired physical strength, lacks appropriate home exercise program and pain with function  Understanding of Dx/Px/POC: good   Prognosis: good    Goals  Short term goals (3 weeks):  1) Pt will improve L knee AROM to 0-125 pain free. 2) Pt will improve B LE and core strength deficits by 1/3 grade MMT. 3) Pt will reports pain at worse <5/10. 4) Pt will initiate and progress HEP w/ special emphasis on functional quad/hip strength, knee ROM.      Long term goals (6 weeks)  1) Pt will improve FOTO to at least 60.   2) Pt will sleep through the night in positioning of choosing 6/7 nights a week w/o waking due to pain. 3) Pt will stand and ambulate community distances w/ normalized gait pattern and pain <3/10. 4) Pt will be independent and compliant w/ HEP in order to maximize functional benefit of skilled PT following d/c.       Plan  Plan details: HEP to start: see code    Patient would benefit from: skilled PT  Planned modality interventions: cryotherapy and thermotherapy: hydrocollator packs  Planned therapy interventions: abdominal trunk stabilization, activity modification, joint mobilization, manual therapy, massage, motor coordination training, neuromuscular re-education, patient education, postural training, stretching, strengthening, therapeutic activities, therapeutic exercise, therapeutic training, transfer training, home exercise program, gait training, graded activity, functional ROM exercises, graded exercise, flexibility, body mechanics training, balance and balance/weight bearing training  Frequency: 2x week  Duration in visits: 12  Duration in weeks: 6  Treatment plan discussed with: patient        Subjective Evaluation    History of Present Illness  Mechanism of injury: Pt reports that she initially injured L knee 13 years ago, had episode where L knee cap popped out. Most recently happened on 9/2/23, dislocation was lateral. Notes that she has had a lot of soreness since. Will be undergoing MRI on 10/10, has MD follow up on 10/17. Main goal is pain relief, wants to avoid surgery. Pt has been taking hydrocodone for many years for multiple issues. Notes that she is unsure if pain pills are helping this or not, has been on them so long she can't tell. Took a pain pill just prior to PT. 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 Notes that L knee leg is a quarter inch shorter than the L.  Functional update below:   Quality of life: good    Patient Goals  Patient goals for therapy: increased strength, decreased pain, increased motion and return to sport/leisure activities  Patient goal: learn how to move knee with less pain, get stronger, possibly avoid surgery  Pain  Current pain ratin  At best pain ratin  At worst pain rating: 10  Location: L distal quad, along L adductors, along L lateral hip, deep in L knee under knee cap. Quality: sharp and dull ache  Relieving factors: rest, medications, change in position and ice (J brace )  Aggravating factors: standing, walking, stair climbing, lifting and overhead activity (ambulation >10 mins)  Progression: no change    Social Support  Steps to enter house: yes  Stairs in house: yes   Lives in: multiple-level home  Lives with: 16year old daughter.     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 )        Objective     Palpation     Additional Palpation Details  TTP through L lateral patellar border, along distal L quad tendon, along medial L thigh     Neurological Testing     Sensation     Knee   Left Knee   Intact: light touch    Right Knee   Intact: light touch     Active Range of Motion   Left Knee   Flexion: 85 degrees with pain  Extension: 0 degrees     Right Knee   Flexion: 125 degrees   Extension: 0 degrees     Passive Range of Motion   Left Knee   Flexion: 108 degrees with pain  Extension: 0 degrees     Right Knee   Normal passive range of motion    Patellar Static Positioning   Left Knee: britt    Additional Patellar Static Positioning Details  Hypomobile w/ poor tracking w/ all functional quad activities, little to no movement    Strength/Myotome Testing     Left Knee   Flexion: 3+  Extension: 3    Right Knee   Flexion: 4-  Extension: 4-    Additional Strength Details  LE Strength (R/L)    Hip  Flex 4/5 , 4/5  Ext 4/5 , 4/5  Abd 4/5 , 4/5  Add 4/5 , 4/5    Core Strength: 1+/5    *Indicates pain      Tests     Additional Tests Details  Deferred s/t knowledge of diagnosis     Ambulation     Ambulation: Level Surfaces     Additional Level Surfaces Ambulation Details  Decreased step length and decreased WB on L LE, decreased heel strike at IC, poor knee flexion through swing phase, fatigues quickly     Functional Assessment        Comments  Sit<>stand: UE support on LE w/ R side WS, poor glute drive     BW squat: not past 25% before significant R side WS     Stairs: TBA - discussed     SLS: unable on L      Flowsheet Rows    Flowsheet Row Most Recent Value   PT/OT G-Codes    Current Score 42   Projected Score 60   FOTO information reviewed Yes             Precautions: standard - 9/2/23    POC expires Auth Status Total   Visits  Start date  Expiration date PT/OT + Visit Limit? Co-Insurance         No                                             Visit/Unit Tracking  AUTH Status:  Date               Visits  Authed: Used                Remaining                    Date (Visit #) IE 9/28 (1) (2) (3) (4) (5)   Manual        DTM and TPR         Patellar mobs med/lat x 2-3 mins                       Exercise Diary         Ther Ex        Active w/u            PROM Tested x 3-4 mins            HS/glute/piri stretch Tested            QS, SLR, hip abd QS x10, GS X10    LAQ x10-15           Active mobility                                                Neuro Re Ed        Bridging             TrA progressions            Squat training tested            Hip Abd Progressions             Balance         gait x4-5 min rev when leaving       HEP and POC review  x8 min w/ anatomy rev                                       Ther Act             stairs             gait             Sit<>stand                                          Modalities              heat               Ice                                            From 9/28:  Access Code: [email or web address]  URL: [email or web address]  Date: 09/28/2023  Prepared by: [address] Sitting Quad Set  - 1 x daily - 7 x weekly - 3 sets - 10 reps  - Supine Gluteal Sets  - 1 x daily - 7 x weekly - 3 sets - 10 reps  - Sitting Heel Slide with Towel  - 1 x daily - 7 x weekly - 3 sets - 10 reps  - Supine Heel Slide with Strap  - 1 x daily - 7 x weekly - 3 sets - 10 reps  - Seated Knee Extension AROM  - 1 x daily - 7 x weekly - 3 sets - 10 reps

## 2023-10-02 ENCOUNTER — OFFICE VISIT (OUTPATIENT)
Dept: PHYSICAL THERAPY | Facility: CLINIC | Age: 63
End: 2023-10-02
Payer: COMMERCIAL

## 2023-10-02 DIAGNOSIS — M22.02 RECURRENT DISLOCATION OF LEFT PATELLA: Primary | ICD-10-CM

## 2023-10-02 PROCEDURE — 97110 THERAPEUTIC EXERCISES: CPT

## 2023-10-02 PROCEDURE — 97112 NEUROMUSCULAR REEDUCATION: CPT

## 2023-10-02 NOTE — PROGRESS NOTES
Daily Note      Today's date: 10/2/2023  Patient name: Morales Hdz  : 1960  MRN: [id number]  Referring provider: Hyacinth Gomez MD  Dx:   Encounter Diagnosis     ICD-10-CM    1. Recurrent dislocation of left patella  M22.02           Subjective: Pt reports that she has not had any instability episodes since initial evaluation. Pt states that she has questions about HEP instructions as she cannot get down/up from the floor safely. Pt states that she has 6/10 pain at baseline, but has difficulty differentiating knee pain from other body pains, such as low back pain. Objective: See treatment diary below; pt presents with moderate swelling throughout the left lower leg, which she states has been present for the past 20 years due to a vascular problem near her spine    Assessment: Pt tolerated treatment well. Pt was educated that she may perform her HEP on her bed or couch instead of down on the floor. Pt required extensive verbal and tactile cuing to facilitate quadriceps setting, but was able to accomplish this with muscle tapping and tactile feedback from towel under knee. Plan: Continue per plan of care. Progress treatment as tolerated.           Insurance:  AMA/CMS Eval/ Re-eval POC expires FOTO Auth Status Co-Insurance   AMA - aetna 23  None req                                           1.   2.   10/2 3.  4.  5.  6.    7.  8.  9.  10.  11.  12.    13.  14.  15.  16.  17.  18.    19.  20. 21. 22. 23. 24.    25.  26. 27. 28. 29. 30.   31. 32.  33. 34. 35. 36.        Precautions: Recent patellar dislocation       EVAL 2 3 4 5 6   Manuals 9/28/23 10/2/23       STM/MFR         Manual flexibility HS, gastrocs, quads         Joint mobs knee         Neuro Re-Ed         Quad sets 10x 2x10 (5s) w/ tactile facilitation and towel under knee       Glute sets 10x 2x10 (5s)        LAQ 10-15x 10x5s       Clamshells         Bridging         SLS                  Ther Ex         Heel slides  10x5s        Gastroc stretch         HS stretch                                                      Ther Activity         Pt education POC, HEP Reviewed HEP, reviewed brace wearing       Stairs         Squats         Gait x4-5 min review while leaving                                   Modalities

## 2023-10-04 ENCOUNTER — OFFICE VISIT (OUTPATIENT)
Dept: PHYSICAL THERAPY | Facility: CLINIC | Age: 63
End: 2023-10-04
Payer: COMMERCIAL

## 2023-10-04 DIAGNOSIS — M22.02 RECURRENT DISLOCATION OF LEFT PATELLA: Primary | ICD-10-CM

## 2023-10-04 PROCEDURE — 97112 NEUROMUSCULAR REEDUCATION: CPT

## 2023-10-04 PROCEDURE — 97110 THERAPEUTIC EXERCISES: CPT

## 2023-10-04 NOTE — PROGRESS NOTES
Daily Note      Today's date: 10/4/2023  Patient name: Gilda Leonard  : 1960  MRN: [id number]  Referring provider: Guillermo Camacho MD  Dx:   Encounter Diagnosis     ICD-10-CM    1. Recurrent dislocation of left patella  M22.02           Subjective: Pt reports increased soreness after last session, which she attributes to the exercises. Pt states it feels like "tightness" throughout the left thigh (quads/adductors). After removing knee brace, pt reports increased pain inferior to the knee cap, where she has redness. Objective: See treatment diary below; pt presents with localized redness inferior to left patella, under inferior strap of knee brace. Assessment: Pt tolerated treatment well. Due to increased symptoms following last session, intensity of exercise was maintained this session. Educated pt regarding contacting referring provider about redness below patella. Pt introduced to PROM L knee and tolerated well. Plan: Continue per plan of care. Progress treatment as tolerated.           Insurance:  AMA/CMS Eval/ Re-eval POC expires FOTO Auth Status Co-Insurance   AMA - aetna 23  None req                                           1.   2.   10/ 3.   10/ 4.  5.  6.    7.  8.  9.  10.  11.  12.    13.  14.  15.  16.  17.  18.    19.  20. 21. 22. 23. 24.    25.  26. 27. 28. 29. 30.   31. 32.  33. 34. 35. 36.        Precautions: Recent patellar dislocation       EVAL 2 3 4 5 6   Manuals 9/28/23 10/2/23 10/4/23      STM/MFR         Manual flexibility HS, gastrocs, quads         Joint mobs knee         Neuro Re-Ed         Quad sets 10x 2x10 (5s) w/ tactile facilitation and towel under knee 10x5s w/ towel under knee      Glute sets 10x 2x10 (5s)  2x10 (5s)       LAQ 10-15x 10x5s 10x      Clamshells         Bridging         SLS                  Ther Ex         PROM L knee   Flex/ext      Heel slides  10x5s        Gastroc stretch   5x15s       HS stretch   5x15s Ther Activity         Pt education POC, HEP Reviewed HEP, reviewed brace wearing Self-monitoring of symptoms L knee      Stairs         Squats         Gait x4-5 min review while leaving                                   Modalities

## 2023-10-09 ENCOUNTER — OFFICE VISIT (OUTPATIENT)
Dept: PHYSICAL THERAPY | Facility: CLINIC | Age: 63
End: 2023-10-09
Payer: COMMERCIAL

## 2023-10-09 DIAGNOSIS — M22.02 RECURRENT DISLOCATION OF LEFT PATELLA: Primary | ICD-10-CM

## 2023-10-09 PROCEDURE — 97112 NEUROMUSCULAR REEDUCATION: CPT

## 2023-10-09 NOTE — PROGRESS NOTES
Daily Note      Today's date: 10/9/2023  Patient name: Fazal Dejesus  : 1960  MRN: [id number]  Referring provider: Chantal Ramirez MD  Dx:   Encounter Diagnosis     ICD-10-CM    1. Recurrent dislocation of left patella  M22.02           Subjective: Pt reports that she had a tough past few days due to pain in the left knee and also recovering from being sick. Pt states that she had a meal this past Friday that led to her becoming sick. Pt states that she felt lethargic after dealing with stomach issues related to this. Pt states she feels that the irritation under her knee cap was due to pressure from the brace. Objective: See treatment diary below; pt presents with indentation of "J" brace inferior to left patella after removal of brace. Assessment: Pt tolerated treatment well. Pt demonstrates improved tolerance for table exercises this visit and was able to perform greater volume today. Pt introduced to sit-to-stand from elevated mat and pt was able to perform this consistency with use of hands on lap and without knee pain. Pt introduced to forward step ups with emphasis on quad and glute drive. Pt was able to perform this exercise without use of railings. Reviewed DOMS and symptoms within normal limits after exercise and pt verbalized understanding. Plan: Continue per plan of care. Progress treatment as tolerated.           Insurance:  AMA/CMS Eval/ Re-eval POC expires FOTO Auth Status Co-Insurance   AMA - aetna 23  None req                                           1.   2.   10 3.   10/ 4.  5.  6.    7.  8.  9.  10.  11.  12.    13.  14.  15.  16.  17.  18.    19.  20. 21. 22. 23. 24.    25.  26. 27. 28. 29. 30.   31. 32.  33. 34. 35. 36.        Precautions: Recent patellar dislocation       EVAL 2 3 4 5 6   Manuals 9/28/23 10/2/23 10/4/23 10/9/23     STM/MFR         Manual flexibility HS, gastrocs, quads         Joint mobs knee         Neuro Re-Ed         [employer] 10x 2x10 (5s) w/ tactile facilitation and towel under knee 10x5s w/ towel under knee 2x10 (5s)      Glute sets 10x 2x10 (5s)  2x10 (5s)  2x10 (5s)      LAQ 10-15x 10x5s 10x 2x10      Clamshells         Bridging         Sit-to-stand    Elevated plinth 2x5     Step up     4" step 2x10      Ther Ex         PROM L knee   Flex/ext      Heel slides  10x5s        Gastroc stretch   5x15s  5x15s     HS stretch   5x15s 5x15s                                                  Ther Activity         Pt education POC, HEP Reviewed HEP, reviewed brace wearing Self-monitoring of symptoms L knee Progressing POC     Stairs         Squats         Gait x4-5 min review while leaving                                   Modalities

## 2023-10-10 ENCOUNTER — HOSPITAL ENCOUNTER (OUTPATIENT)
Dept: RADIOLOGY | Facility: HOSPITAL | Age: 63
Discharge: HOME | End: 2023-10-10
Payer: COMMERCIAL

## 2023-10-10 DIAGNOSIS — M22.02 RECURRENT DISLOCATION OF LEFT PATELLA: ICD-10-CM

## 2023-10-10 PROCEDURE — G1004 CDSM NDSC: HCPCS

## 2023-10-10 PROCEDURE — 73721 MRI JNT OF LWR EXTRE W/O DYE: CPT

## 2023-10-11 ENCOUNTER — OFFICE VISIT (OUTPATIENT)
Dept: PHYSICAL THERAPY | Facility: CLINIC | Age: 63
End: 2023-10-11
Payer: COMMERCIAL

## 2023-10-11 DIAGNOSIS — M22.02 RECURRENT DISLOCATION OF LEFT PATELLA: Primary | ICD-10-CM

## 2023-10-11 PROCEDURE — 97110 THERAPEUTIC EXERCISES: CPT

## 2023-10-11 PROCEDURE — 97112 NEUROMUSCULAR REEDUCATION: CPT

## 2023-10-11 NOTE — PROGRESS NOTES
Daily Note      Today's date: 10/11/2023  Patient name: Beckie Castle  : 1960  MRN: [id number]  Referring provider: Farhad Cheema MD  Dx:   Encounter Diagnosis     ICD-10-CM    1. Recurrent dislocation of left patella  M22.02           Subjective: Pt reports no soreness after last session due to exercise, however still has the same general soreness throughout the left knee. Pt states that she had an MRI taken yesterday and will find out results in the middle of next week. Objective: See treatment diary below    Assessment: Pt tolerated treatment well. Pt introduced to Nustep warmup and noted medial/posterior left knee tension after ~4 minutes, but was able to complete the exercise. Reviewed proper alignment of knee support brace as pt questioned if patellar opening was positioned too low. Confirmed proper fit. Pt demonstrates improved eccentric control with sit-to-stand. Plan: Continue per plan of care. Progress treatment as tolerated.           Insurance:  AMA/CMS Eval/ Re-eval POC expires FOTO Auth Status Co-Insurance   AMA - aetna 23  None req                                           1.   2.   10 3.   10/ 4.   10/9 5.   10/11 6.    7.  8.  9.  10.  11.  12.    13.  14.  15.  16.  17.  18.    19.  20. 21. 22. 23. 24.    25.  26. 27. 28. 29. 30.   31. 32.  33. 34. 35. 36.        Precautions: Recent patellar dislocation       EVAL 2 3 4 5 6   Manuals 9/28/23 10/2/23 10/4/23 10/9/23 10/11/23    STM/MFR         Manual flexibility HS, gastrocs, quads         Joint mobs knee         Neuro Re-Ed         Quad sets 10x 2x10 (5s) w/ tactile facilitation and towel under knee 10x5s w/ towel under knee 2x10 (5s)  2x10 (5s)     Glute sets 10x 2x10 (5s)  2x10 (5s)  2x10 (5s)  2x10 (5s)     LAQ 10-15x 10x5s 10x 2x10  2x10     Clamshells         Bridging         Sit-to-stand    Elevated plinth 2x5 Elevated plinth 2x5    Step up     4" step 2x10      Ther Ex         Nustep     5' L1    PROM L knee   Flex/ext      Heel slides  10x5s    10x w/ peanut    Gastroc stretch   5x15s  5x15s 5x15s    HS stretch   5x15s 5x15s 5x15s                                                 Ther Activity         Pt education POC, HEP Reviewed HEP, reviewed brace wearing Self-monitoring of symptoms L knee Progressing POC Brace fit    Stairs         Squats         Gait x4-5 min review while leaving                                   Modalities

## 2023-10-16 ENCOUNTER — OFFICE VISIT (OUTPATIENT)
Dept: PHYSICAL THERAPY | Facility: CLINIC | Age: 63
End: 2023-10-16
Payer: COMMERCIAL

## 2023-10-16 DIAGNOSIS — M22.02 RECURRENT DISLOCATION OF LEFT PATELLA: Primary | ICD-10-CM

## 2023-10-16 PROCEDURE — 97112 NEUROMUSCULAR REEDUCATION: CPT

## 2023-10-16 NOTE — PROGRESS NOTES
Daily Note      Today's date: 10/16/2023  Patient name: Mike Reyes  : 1960  MRN: [id number]  Referring provider: Elida Trinidad MD  Dx:   Encounter Diagnosis     ICD-10-CM    1. Recurrent dislocation of left patella  M22.02           Subjective: Pt reports that her left knee is hurting more today, but is not able to identify a specific activity or reason for more pain today. Pt states that she performed housework and went to the grocery store over the weekend. Objective: See treatment diary below    Assessment: Pt tolerated treatment well. Pt presents with indentation upon removing J brace, but with less redness and swelling compared to last week. Pt progressed to lateral step downs and notes less confidence in the LLE vs. RLE. Pt demonstrates improved confidence with sit-to-stand and step ups. Plan: Continue per plan of care. Progress treatment as tolerated.           Insurance:  AMA/CMS Eval/ Re-eval POC expires FOTO Auth Status Co-Insurance   AMA - aetna 23  None req                                           1.   2.   10/2 3.   10/ 4.   10 5.   10/11 6.   10/16   7.  8.  9.  10.  11.  12.    13.  14.  15.  16.  17.  18.    19.  20. 21. 22. 23. 24.    25.  26. 27. 28. 29. 30.   31. 32.  33. 34. 35. 36.        Precautions: Recent patellar dislocation       EVAL 2 3 4 5 6   Manuals 9/28/23 10/2/23 10/4/23 10/9/23 10/11/23 10/16/23   STM/MFR         Manual flexibility HS, gastrocs, quads         Joint mobs knee         Neuro Re-Ed         Quad sets 10x 2x10 (5s) w/ tactile facilitation and towel under knee 10x5s w/ towel under knee 2x10 (5s)  2x10 (5s)  2x10 (5s)    Glute sets 10x 2x10 (5s)  2x10 (5s)  2x10 (5s)  2x10 (5s)  2x10 (5s)    LAQ 10-15x 10x5s 10x 2x10  2x10     Clamshells         Bridging         Sit-to-stand    Elevated plinth 2x5 Elevated plinth 2x5 Elevated plinth 2x7   Step up     4" step 2x10   15x 4" step   Glute med heel tap      10x B/L 4" Ther Ex         Nustep     5' L1    PROM L knee   Flex/ext      Heel slides  10x5s    10x w/ peanut 10x w/ peanut   Gastroc stretch   5x15s  5x15s 5x15s    HS stretch   5x15s 5x15s 5x15s                                                 Ther Activity         Pt education POC, HEP Reviewed HEP, reviewed brace wearing Self-monitoring of symptoms L knee Progressing POC Brace fit    Stairs         Squats         Gait x4-5 min review while leaving                                   Modalities

## 2023-10-18 ENCOUNTER — OFFICE VISIT (OUTPATIENT)
Dept: OBGYN CLINIC | Facility: CLINIC | Age: 63
End: 2023-10-18
Payer: COMMERCIAL

## 2023-10-18 VITALS
DIASTOLIC BLOOD PRESSURE: 72 MMHG | BODY MASS INDEX: 29.02 KG/M2 | HEART RATE: 81 BPM | HEIGHT: 64 IN | SYSTOLIC BLOOD PRESSURE: 102 MMHG | WEIGHT: 170 LBS

## 2023-10-18 DIAGNOSIS — M22.02 RECURRENT DISLOCATION OF LEFT PATELLA: Primary | ICD-10-CM

## 2023-10-18 DIAGNOSIS — M25.861 PATELLOFEMORAL DYSFUNCTION OF RIGHT KNEE: ICD-10-CM

## 2023-10-18 PROCEDURE — 99213 OFFICE O/P EST LOW 20 MIN: CPT | Performed by: ORTHOPAEDIC SURGERY

## 2023-10-18 RX ORDER — ONDANSETRON 8 MG/1
TABLET, ORALLY DISINTEGRATING ORAL
COMMUNITY
Start: 2023-10-11

## 2023-10-19 ENCOUNTER — TELEPHONE (OUTPATIENT)
Dept: PHYSICAL THERAPY | Facility: CLINIC | Age: 63
End: 2023-10-19

## 2023-10-19 NOTE — PROGRESS NOTES
Assessment/Plan:  1. Recurrent dislocation of left patella        2. Patellofemoral dysfunction of right knee            58-year-old female with recurrent left patellar instability. We went over her MRI together today. We also went over treatment options going forward including operative and nonoperative treatment. We discussed that operative treatment would consist of MPFL reconstruction and knee arthroscopy. She does not have any cartilage defects on her MRI today. We discussed reasonable timeline for rehab and the surgery in detail. At this time she does not think she can take off any time for work and wants to continue with conservative nonoperative management. We discussed that given her age there is a good chance that she will be able to improve with nonsurgical management. We will continue with physical therapy to help strengthen her quad and VMO. She may also continue use the J brace at this time. We will plan to see her back in 6 to 8 weeks time to check her progress. She may continue to use over-the-counter medications for inflammation. All questions were answered today. Subjective: Today's Visit: Overall she is doing well. She think she has not been improving with physical therapy. She has been wearing the J braces well. She has been able to work without much difficulty. Previous HPI:  Juan Pryor is a 70 y.o. female who presents left knee pain in the setting of left patellar dislocation. On 9/6/2023 she had gotten out of bed in the middle night as she twisted her leg left patella dislocated laterally. She was unable to self reduce it and needed to go to the emergency room for sedation and reduction. Has been wearing J braces for the last week. She has not done any physical therapy.     She has a history of bilateral patellar instability and subluxations in the past.  On the left knee she has had 1 further dislocation which happened 13 years ago while she was living in [place]. This needed to be reduced in the emergency room. She saw an orthopedic surgeon at that time who did not talk to her about any surgical intervention. She had a history of intoeing as a child and needed bracing. She has had bilateral patellar instability since she was a teenager. She has what she calls recurrent subluxations. Of note she has left lower extremity swelling in her ankle that is chronic in nature. 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 Review of Systems   Constitutional: Negative. Negative for chills and fever. HENT: Negative. Negative for ear pain and sore throat. Eyes: Negative. Negative for pain and redness. Respiratory: Negative. Negative for shortness of breath and wheezing. Cardiovascular:  Negative for chest pain and palpitations. Gastrointestinal: Negative. Negative for abdominal pain and blood in stool. Endocrine: Negative. Negative for polydipsia and polyuria. Genitourinary: Negative. Negative for difficulty urinating and dysuria. Musculoskeletal:         As noted in HPI   Skin: Negative. Negative for pallor and rash. Neurological: Negative. Negative for dizziness and numbness. Hematological: Negative. Negative for adenopathy. Does not bruise/bleed easily. Psychiatric/Behavioral: Negative. Negative for confusion and suicidal ideas. Past Medical History:   Diagnosis Date   • Arthritis    • Disease of thyroid gland    • History of transfusion        Past Surgical History:   Procedure Laterality Date   • APPENDECTOMY     • HERNIA REPAIR     • OOPHORECTOMY Right        History reviewed. No pertinent family history.     Social History     Occupational History   • Not on file   Tobacco Use   • Smoking status: Former     Types: Cigarettes Quit date: 36     Years since quittin.8   • Smokeless tobacco: Never   Vaping Use   • Vaping Use: Never used   Substance and Sexual Activity   • Alcohol use: Never     Alcohol/week: 0.0 standard drinks of alcohol   • Drug use: Not Currently     Types: Marijuana   • Sexual activity: Not Currently         Current Outpatient Medications:   •  HYDROcodone-acetaminophen (NORCO) 7.5-325 mg per tablet, Take 1 tablet by mouth every 8 (eight) hours as needed for pain, Disp: , Rfl:   •  ibuprofen (MOTRIN) 100 mg/5 mL suspension, Take by mouth if needed for mild pain, Disp: , Rfl:   •  ondansetron (ZOFRAN-ODT) 8 mg disintegrating tablet, DISSOLVE 1 TABLET IN MOUTH EVERY 12 HOURS AND PLACE ON THE TOP OF THE TONGUE WHERE IT WILL DISSOLVE, THEN SWALLOW, Disp: , Rfl:   •  acetaminophen (TYLENOL) 325 mg tablet, Take 2 tablets (650 mg total) by mouth every 6 (six) hours as needed for mild pain, headaches or fever (Patient not taking: Reported on 2022), Disp: 30 tablet, Rfl: 0  •  cyclobenzaprine (FLEXERIL) 10 mg tablet, Take 10 mg by mouth 3 (three) times a day as needed for muscle spasms (Patient not taking: Reported on 2022), Disp: , Rfl:   •  dextromethorphan-guaiFENesin (ROBITUSSIN DM)  mg/5 mL syrup, Take 10 mL by mouth 3 (three) times a day as needed for cough (Patient not taking: Reported on 2022), Disp: 118 mL, Rfl: 0  •  diazepam (VALIUM) 2 mg tablet, Take 2 mg by mouth every 6 (six) hours as needed for anxiety (Patient not taking: Reported on 2022), Disp: , Rfl:   •  dicyclomine (BENTYL) 20 mg tablet, Take 1 tablet (20 mg total) by mouth 2 (two) times a day (Patient not taking: Reported on 2023), Disp: 20 tablet, Rfl: 0  •  famotidine (PEPCID) 20 mg tablet, Take 1 tablet (20 mg total) by mouth daily (Patient not taking: Reported on 2023), Disp: 30 tablet, Rfl: 0  •  ondansetron (ZOFRAN) 4 mg tablet, Take 1 tablet (4 mg total) by mouth every 6 (six) hours (Patient not taking: Reported on 4/23/2023), Disp: 12 tablet, Rfl: 0  •  oxyCODONE-acetaminophen (PERCOCET) 5-325 mg per tablet, Take 1 tablet by mouth every 4 (four) hours as needed for moderate pain (Patient not taking: Reported on 4/23/2023), Disp: , Rfl:     No Known Allergies    Objective:  Vitals:    10/18/23 1749   BP: 102/72   Pulse: 81       Right Knee Exam     Range of Motion   Extension:  0   Flexion:  140     Tests   Nicolasa:  Medial - negative Lateral - negative  Varus: negative Valgus: negative  Lachman:  Anterior - negative      Drawer:  Anterior - negative    Posterior - negative  Pivot shift: negative  Patellar apprehension: positive (2+)    Other   Erythema: absent  Scars: absent  Sensation: normal  Pulse: present  Swelling: none  Effusion: no effusion present      Left Knee Exam     Range of Motion   Extension:  0   Flexion:  110     Tests   Nicolasa:  Medial - negative Lateral - negative  Varus: negative Valgus: negative  Lachman:  Anterior - negative      Drawer:  Anterior - negative     Posterior - negative  Pivot shift: negative  Patellar apprehension: positive (Guards on exam)    Other   Erythema: absent  Scars: absent  Sensation: normal  Pulse: present  Swelling: mild  Effusion: no effusion present    Comments:  Left lower extremity swelling          Observations   Left Knee   Negative for effusion. Right Knee   Negative for effusion. Physical Exam  Vitals and nursing note reviewed. Constitutional:       Appearance: She is well-developed. HENT:      Head: Normocephalic and atraumatic. Pulmonary:      Effort: Pulmonary effort is normal. No respiratory distress. Musculoskeletal:      Cervical back: Normal range of motion and neck supple. Right knee: No effusion. Instability Tests: Medial Nicolasa test negative and lateral Nicolasa test negative. Left knee: No effusion. Instability Tests: Medial Nicolasa test negative and lateral Nicolasa test negative. Skin:     Findings: No rash. Neurological:      Mental Status: She is alert and oriented to person, place, and time. Psychiatric:         Behavior: Behavior normal.         I have personally reviewed pertinent films in PACS and my interpretation is as follows:  Previous x-rays done on 9/14/2023 no significant osteoarthritis. No fractures or dislocations    MRI left knee was reviewed showing bone bruise pattern from a lateral patellar dislocation with medial patellar facet bruising and lateral femoral condyle bruising. Also appears the MPFL has been stretched out. There is lateral translation of the patella. TT-TG of 16 mm. This document was created using speech voice recognition software. Grammatical errors, random word insertions, pronoun errors, and incomplete sentences are an occasional consequence of this system due to software limitations, ambient noise, and hardware issues. Any formal questions or concerns about content, text, or information contained within the body of this dictation should be directly addressed to the provider for clarification.

## 2023-10-19 NOTE — TELEPHONE ENCOUNTER
Spoke to pt in person 10/18 following orthopedic follow up visit and pt verbalized the plan is to continue conservative treatment for 6-8 weeks in PT as surgery is not indicated at this time.

## 2023-10-23 ENCOUNTER — OFFICE VISIT (OUTPATIENT)
Dept: PHYSICAL THERAPY | Facility: CLINIC | Age: 63
End: 2023-10-23
Payer: COMMERCIAL

## 2023-10-23 DIAGNOSIS — M22.02 RECURRENT DISLOCATION OF LEFT PATELLA: Primary | ICD-10-CM

## 2023-10-23 PROCEDURE — 97112 NEUROMUSCULAR REEDUCATION: CPT

## 2023-10-23 PROCEDURE — 97110 THERAPEUTIC EXERCISES: CPT

## 2023-10-23 NOTE — PROGRESS NOTES
Daily Note      Today's date: 10/23/2023  Patient name: Maegan Matthew  : 1960  MRN: [id number]  Referring provider: Karan Rodriguez MD  Dx:   Encounter Diagnosis     ICD-10-CM    1. Recurrent dislocation of left patella  M22.02           Subjective: Pt reports no new changes since last visit, other than plan of care previously discussed with orthopedics. Pt states that she has attempted to ascend the stairs step over step. Objective: See treatment diary below    Assessment: Pt tolerated treatment well. Pt progressed to SLR and bridging and demonstrated good form, but fatigued quickly. Pt also performed increased repetitions of forward step ups and lateral step downs. Pt noted residual anterior knee pain (PFJ) and medial knee pain at the end of the program.     Plan: Continue per plan of care. Progress treatment as tolerated.           Insurance:  AMA/CMS Eval/ Re-eval POC expires FOTO Auth Status Co-Insurance   AMA - aetna 23  None req                                           1.   2.   10/ 3.   10/ 4.   10 5.   10/ 6.   10/16   7.   10/23 8.  9.  10.  11.  12.    13.  14.  15.  16.  17.  18.    19.  20. 21. 22. 23. 24.    25.  26. 27. 28. 29. 30.   31. 32.  33. 34. 35. 36.        Precautions: Recent patellar dislocation       3 4 5 6 7    Manuals 10/4/23 10/9/23 10/11/23 10/16/23 10/23/23    STM/MFR         Manual flexibility HS, gastrocs, quads         Joint mobs knee         Neuro Re-Ed         Quad sets 10x5s w/ towel under knee 2x10 (5s)  2x10 (5s)  2x10 (5s)  2x10 (5s)     SLR     2x8    Glute sets 2x10 (5s)  2x10 (5s)  2x10 (5s)  2x10 (5s)      LAQ 10x 2x10  2x10   2x10    Clamshells         Bridging     2x8    Sit-to-stand  Elevated plinth 2x5 Elevated plinth 2x5 Elevated plinth 2x7 Elevated plinth 2x8     Step up   4" step 2x10   15x 4" step 2x10 4" step     Glute med heel tap    10x B/L 4"  15x B/L 4"     Ther Ex         Nustep   5' L1      PROM L knee Flex/ext        Heel slides   10x w/ peanut 10x w/ peanut 15x5s w/ peanut    Gastroc stretch 5x15s  5x15s 5x15s      HS stretch 5x15s 5x15s 5x15s      Leg press      2x10 65#                                        Ther Activity         Pt education Self-monitoring of symptoms L knee Progressing POC Brace fit      Stairs         Squats         Gait                                    Modalities

## 2023-10-30 ENCOUNTER — EVALUATION (OUTPATIENT)
Dept: PHYSICAL THERAPY | Facility: CLINIC | Age: 63
End: 2023-10-30
Payer: COMMERCIAL

## 2023-10-30 DIAGNOSIS — M22.02 RECURRENT DISLOCATION OF LEFT PATELLA: Primary | ICD-10-CM

## 2023-10-30 PROCEDURE — 97112 NEUROMUSCULAR REEDUCATION: CPT

## 2023-10-30 PROCEDURE — 97110 THERAPEUTIC EXERCISES: CPT

## 2023-10-30 NOTE — PROGRESS NOTES
Progress Note     Today's date: 10/30/2023  Patient name: Kinza Rivero  : 1960  MRN: [id number]  Referring provider: Saad Bettencourt MD  Dx:   Encounter Diagnosis     ICD-10-CM    1. Recurrent dislocation of left patella  M22.02                      Subjective: Pt reports good progress since starting skilled PT. Notes that going up or down stairs is still difficult, but improving. Feels that she is able to perform steps one over the other more often than not. Feels about 50% better overall. Encouraged by progress and would like to continue w/ PT. Functional update below:       Goals  Short term goals (3 weeks): ALL GOALS PROGRESSED   1) Pt will improve L knee AROM to 0-125 pain free. 2) Pt will improve B LE and core strength deficits by 1/3 grade MMT. 3) Pt will reports pain at worse <5/10. 4) Pt will initiate and progress HEP w/ special emphasis on functional quad/hip strength, knee ROM. Long term goals (6 weeks) ALL GOALS PROGRESSED   1) Pt will improve FOTO to at least 60.   2) Pt will sleep through the night in positioning of choosing 6/7 nights a week w/o waking due to pain. 3) Pt will stand and ambulate community distances w/ normalized gait pattern and pain <3/10. 4) Pt will be independent and compliant w/ HEP in order to maximize functional benefit of skilled PT following d/c.     *goals extended by 2 and 4 weeks, respectively     Pain  Current pain ratin  At best pain ratin  At worst pain ratin  Location: L distal quad, along L adductors, along L lateral hip, deep in L knee under knee cap.   Quality: sharp and dull ache  Relieving factors: rest, medications, change in position and ice (J brace )  Aggravating factors: standing, walking, stair climbing, lifting and overhead activity (ambulation >10 mins)  Progression: improved since IE    Social Support  Steps to enter house: yes  Stairs in house: yes   Lives in: multiple-level home  Lives with: 16year old daughter. 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 )        Objective     Palpation     Additional Palpation Details  TTP through L lateral patellar border, along distal L quad tendon, along medial L thigh    -10/30: TTP through medial L knee and thigh, specifically around joint line    Neurological Testing     Sensation     Knee   Left Knee   Intact: light touch    Right Knee   Intact: light touch     Active Range of Motion   Left Knee   Flexion: 126 degrees with min medial knee pain at end range   Extension: 0 degrees     Right Knee   Flexion: 134 degrees   Extension: 0 degrees     Passive Range of Motion   Left Knee   Flexion: 130 degrees with min pain at end range  Extension: 0 degrees     Right Knee   Normal passive range of motion    Patellar Static Positioning   Left Knee: britt - improving    Additional Patellar Static Positioning Details  Hypomobile w/ poor tracking w/ all functional quad activities, little to no movement   -10/30: good tracking throughout w/ solid quad set    Strength/Myotome Testing     Left Knee   Flexion: 4-  Extension: 3+    Right Knee   Flexion: 4  Extension: 4    Additional Strength Details  LE Strength (R/L)    Hip  4-4+/5 throughout    Core Strength: 1+/5    *Indicates pain      Tests     Additional Tests Details  Deferred s/t knowledge of diagnosis     Ambulation     Ambulation: Level Surfaces     Additional Level Surfaces Ambulation Details  Decreased step length and decreased WB on L LE, decreased heel strike at IC, poor knee flexion through swing phase, fatigues quickly    -10/30: grossly unremarkable in clinic     Functional Assessment        Comments  Sit<>stand: UE support on LE w/ R side WS, poor glute drive    -77/68: good  glute drive, can perform w/o UE support from slightly elevated surface.     BW squat: not past 25% before significant R side WS    -10/30: at least 50% w/o compensation, medial knee dive w/ greater depths, fair glute drive     Stairs: TBA - discussed    -10/30: at least 6" up/down w/ one sided UE support, fair glute drive    SLS: unable on L   -10/30: not tested       Assessment: Tolerated treatment well. Patient demonstrated fatigue post treatment and would benefit from continued PT. Pt has been re-assessed following 8 skilled therapy visits. She has made excellent objective improvements in functional strength and L knee ROM, quality of functional motion, and self reports of pain. Her FOTO score has improved as well. She is motivated by progress and is appropriate for continued skilled progressions barring setback. Focus on loaded strength as sx allow. Plan: Continue per plan of care.  Stair progressions, retro walking, squat and hip abd progressions      Insurance:  AMA/CMS Eval/ Re-eval POC expires FOTO Auth Status Co-Insurance   AMA - aetna 9/27/23 12/27/23  None req                                           1.  9/28 2.   10/2 3.   10/4 4.   10/9 5.   10/11 6.   10/16   7.   10/23 8.   10/30 PN 9.  10.  11.  12.    13.  14.  15.  16.  17.  18.    19.  20. 21. 22. 23. 24.    25.  26. 27. 28. 29. 30.   31. 32.  33. 34. 35. 36.        Precautions: Recent patellar dislocation       3 4 5 6 7 8 - PN   Manuals 10/4/23 10/9/23 10/11/23 10/16/23 10/23/23 10/30/23   STM/MFR         Manual flexibility HS, gastrocs, quads         Joint mobs knee         Neuro Re-Ed         Quad sets 10x5s w/ towel under knee 2x10 (5s)  2x10 (5s)  2x10 (5s)  2x10 (5s)  X10-15 total   SLR     2x8 2x10 on R    Glute sets 2x10 (5s)  2x10 (5s)  2x10 (5s)  2x10 (5s)      LAQ 10x 2x10  2x10   2x10    Clamshells         Bridging     2x8 2x10 w/ ad sq   Sit-to-stand  Elevated plinth 2x5 Elevated plinth 2x5 Elevated plinth 2x7 Elevated plinth 2x8  Same x15 total w/ red tband around knees   Step up   4" step 2x10   15x 4" step 2x10 4" step  6" x20    Glute med heel tap    10x B/L 4"  15x B/L 4"  6" x20   Hip abd side step      Red 15'x8   Squat at bar       2x10   HEP and POC rev      X5 mins   Ther Ex         Nustep   5' L1   5 min lvl 2-3   PROM L knee Flex/ext     X2-3 mins flex/ext   Heel slides   10x w/ peanut 10x w/ peanut 15x5s w/ peanut rev   Gastroc stretch 5x15s  5x15s 5x15s      HS stretch 5x15s 5x15s 5x15s   3x30 sec    Leg press      2x10 65# NV                                       Ther Activity         Pt education Self-monitoring of symptoms L knee Progressing POC Brace fit      Stairs         Squats         Gait                                    Modalities

## 2023-11-01 ENCOUNTER — OFFICE VISIT (OUTPATIENT)
Dept: PHYSICAL THERAPY | Facility: CLINIC | Age: 63
End: 2023-11-01
Payer: COMMERCIAL

## 2023-11-01 DIAGNOSIS — M22.02 RECURRENT DISLOCATION OF LEFT PATELLA: Primary | ICD-10-CM

## 2023-11-01 PROCEDURE — 97112 NEUROMUSCULAR REEDUCATION: CPT

## 2023-11-01 PROCEDURE — 97110 THERAPEUTIC EXERCISES: CPT

## 2023-11-01 NOTE — PROGRESS NOTES
Daily Note      Today's date: 2023  Patient name: Tab Nova  : 1960  MRN: [id number]  Referring provider: Cabrera Olivares MD  Dx:   Encounter Diagnosis     ICD-10-CM    1. Recurrent dislocation of left patella  M22.02           Subjective: Pt reports that her left knee has been hurting a little more over the past few days. Pt is unable to attribute this to a specific activity. Pt states that she has been trying to consistently go up the stairs step over step, but thinks maybe this was too much. Objective: See treatment diary below    Assessment: Pt tolerated treatment well. Educated pt regarding activity modification depending on symptoms and pt verbalized understanding. Pt advised to attempt reciprocal stair negotiation when baseline pain is low, but is able to adjust to non-reciprocal pattern when knee is feeling less stable or more fatigued. Pt agreed with this plan. Pt noted limited strength with ascending stairs on 6" step, so this was modified by using 4" step and pt noted less pain, but still moderate challenge. Plan: Continue per plan of care. Progress treatment as tolerated.           Insurance:  AMA/CMS Eval/ Re-eval POC expires FOTO Auth Status Co-Insurance   AMA - aetna 23  None req                                           1.  9/28 2.   10/2 3.   10/4 4.   10/9 5.   10/11 6.   10/   7.   10/23 8.   10/30 PN 9.    10.  11.  12.    13.  14.  15.  16.  17.  18.    19.  20. 21. 22. 23. 24.    25.  26. 27. 28. 29. 30.   31. 32.  33. 34. 35. 36.        Precautions: Recent patellar dislocation       5 6 7 8 - PN 9    Manuals 10/11/23 10/16/23 10/23/23 10/30/23 11/1/23    STM/MFR         Manual flexibility HS, gastrocs, quads         Joint mobs knee         Neuro Re-Ed         Quad sets 2x10 (5s)  2x10 (5s)  2x10 (5s)  X10-15 total     SLR   2x8 2x10 on R  2x10     Glute sets 2x10 (5s)  2x10 (5s)        LAQ 2x10   2x10      Clamshells         Bridging 2x8 2x10 w/ ad sq 2x10     Sit-to-stand Elevated plinth 2x5 Elevated plinth 2x7 Elevated plinth 2x8  Same x15 total w/ red tband around knees Elevated plinth (19") 3x8     Step up   15x 4" step 2x10 4" step  6" x20  20x 4" step    Glute med heel tap  10x B/L 4"  15x B/L 4"  6" x20 20x 4" step    Hip abd side step    Red 15'x8     Squat at bar     2x10 2x10     HEP and POC rev    X5 mins     Ther Ex         Nustep 5' L1   5 min lvl 2-3 5 min lvl 2     PROM L knee    X2-3 mins flex/ext     Heel slides 10x w/ peanut 10x w/ peanut 15x5s w/ peanut rev     Gastroc stretch 5x15s    5x15s     HS stretch 5x15s   3x30 sec  5x15s    Leg press    2x10 65# NV                                         Ther Activity         Pt education Brace fit        Stairs         Squats         Gait                                    Modalities

## 2023-11-06 ENCOUNTER — OFFICE VISIT (OUTPATIENT)
Dept: PHYSICAL THERAPY | Facility: CLINIC | Age: 63
End: 2023-11-06
Payer: COMMERCIAL

## 2023-11-06 DIAGNOSIS — M22.02 RECURRENT DISLOCATION OF LEFT PATELLA: Primary | ICD-10-CM

## 2023-11-06 PROCEDURE — 97112 NEUROMUSCULAR REEDUCATION: CPT

## 2023-11-06 NOTE — PROGRESS NOTES
Daily Note      Today's date: 2023  Patient name: Raya Rondon  : 1960  MRN: [id number]  Referring provider: Yobani Schroeder MD  Dx:   Encounter Diagnosis     ICD-10-CM    1. Recurrent dislocation of left patella  M22.02           Subjective: Pt reports no new changes in the left knee since last visit. Pt states that she feels tired overall. Objective: See treatment diary below    Assessment: Pt tolerated treatment well. Pt demonstrates improved eccentric control during sit-to-stand exercise. Pt progressed back to 6" step with forward step ups and lateral step downs, requiring minor use of railings. Pt demonstrated improved eccentric control with this exercise as well. Plan to resume leg press and squats next visit. Plan: Continue per plan of care. Progress treatment as tolerated.           Insurance:  AMA/CMS Eval/ Re-eval POC expires FOTO Auth Status Co-Insurance   AMA - aetna 23  None req                                           1.  9 2.   10/2 3.   10/4 4.   10/9 5.   10/11 6.   10/16   7.   10 8.   10 PN 9.   11/1 10.   11/6 11.  12.    13.  14.  15.  16.  17.  18.    19.  20. 21. 22. 23. 24.    25.  26. 27. 28. 29. 30.   31. 32.  33. 34. 35. 36.        Precautions: Recent patellar dislocation       7 8 - PN 9 10     Manuals 10/23/23 10/30/23 11/1/23 11/6/23     STM/MFR         Manual flexibility HS, gastrocs, quads         Joint mobs knee         Neuro Re-Ed         Quad sets 2x10 (5s)  X10-15 total  2x10 (5s)      SLR 2x8 2x10 on R  2x10  2x10      Glute sets         LAQ 2x10   2x10      Clamshells         Bridging 2x8 2x10 w/ ad sq 2x10  2x10      Sit-to-stand Elevated plinth 2x8  Same x15 total w/ red tband around knees Elevated plinth (19") 3x8  Plinth at 19" 3x8     Step up  2x10 4" step  6" x20  20x 4" step 20x 6" step     Glute med heel tap 15x B/L 4"  6" x20 20x 4" step 20x 6" step      Hip abd side step  Red 15'x8       Squat at bar   2x10 2x10 HEP and POC rev  X5 mins       Ther Ex         Nustep  5 min lvl 2-3 5 min lvl 2  5 min lvl 2      PROM L knee  X2-3 mins flex/ext       Heel slides 15x5s w/ peanut rev       Gastroc stretch   5x15s       HS stretch  3x30 sec  5x15s      Leg press  2x10 65# NV  -->                                         Ther Activity         Pt education         Stairs         Squats         Gait                                    Modalities

## 2023-11-13 ENCOUNTER — OFFICE VISIT (OUTPATIENT)
Dept: PHYSICAL THERAPY | Facility: CLINIC | Age: 63
End: 2023-11-13
Payer: COMMERCIAL

## 2023-11-13 DIAGNOSIS — M22.02 RECURRENT DISLOCATION OF LEFT PATELLA: Primary | ICD-10-CM

## 2023-11-13 PROCEDURE — 97110 THERAPEUTIC EXERCISES: CPT

## 2023-11-13 PROCEDURE — 97112 NEUROMUSCULAR REEDUCATION: CPT

## 2023-11-13 NOTE — PROGRESS NOTES
Daily Note      Today's date: 2023  Patient name: Scout Schultz  : 1960  MRN: [id number]  Referring provider: Collene Heimlich, MD  Dx:   Encounter Diagnosis     ICD-10-CM    1. Recurrent dislocation of left patella  M22.02           Subjective: Pt reports that she slept poorly last night due to back pain. Pt states that she did a lot around the house this past Saturday, including mowing the lawn. Pt states that her back and knee both hurt more. Pt states she feels tired today. Objective: See treatment diary below    Assessment: Pt tolerated treatment well. Pt progressed to sit-to-stand from chair and self-identified dynamic valgus while transferring. Pt was able to self-correct this with verbal cuing to push both knees outward. Pt resumed with leg press and demonstrated good control of extension and during eccentric return to starting position. Plan: Continue per plan of care. Progress treatment as tolerated. Insurance:  AMA/CMS Eval/ Re-eval POC expires FOTO Auth Status Co-Insurance   AMA - aetna 23  None req                                           1.   2.   10/2 3.   10/4 4.   10/9 5.   10/11 6.   10/16   7.   10/23 8.   10 PN 9.    10.   /6 11.    12.     13.  14.  15.  16.  17.  18.    19.  20. 21. 22. 23. 24.    25.  26. 27. 28. 29. 30.   31. 32.  33. 34. 35. 36.        Precautions: Recent patellar dislocation       7 8 - PN 9 10 11    Manuals 10/23/23 10/30/23 11/1/23 11/6/23 11/13/23    STM/MFR         Manual flexibility HS, gastrocs, quads         Joint mobs knee         Neuro Re-Ed         Quad sets 2x10 (5s)  X10-15 total  2x10 (5s)      SLR 2x8 2x10 on R  2x10  2x10  2x10     Glute sets         LAQ 2x10   2x10  2x10     Clamshells         Bridging 2x8 2x10 w/ ad sq 2x10  2x10      Sit-to-stand Elevated plinth 2x8  Same x15 total w/ red tband around knees Elevated plinth (19") 3x8  Plinth at 19" 3x8 Chair 3x8     Step up  2x10 4" step  6" x20  20x 4" step 20x 6" step 20x 6" step    Glute med heel tap 15x B/L 4"  6" x20 20x 4" step 20x 6" step      Hip abd side step  Red 15'x8       Squat at bar   2x10 2x10       HEP and POC rev  X5 mins       Ther Ex         Nustep  5 min lvl 2-3 5 min lvl 2  5 min lvl 2  5 min lvl 2     PROM L knee  X2-3 mins flex/ext       Heel slides 15x5s w/ peanut rev       Gastroc stretch   5x15s       HS stretch  3x30 sec  5x15s      Leg press  2x10 65# NV  --> 3x10 75#                                        Ther Activity         Pt education         Stairs         Squats         Gait                                    Modalities

## 2023-11-15 ENCOUNTER — OFFICE VISIT (OUTPATIENT)
Dept: PHYSICAL THERAPY | Facility: CLINIC | Age: 63
End: 2023-11-15
Payer: COMMERCIAL

## 2023-11-15 DIAGNOSIS — M22.02 RECURRENT DISLOCATION OF LEFT PATELLA: Primary | ICD-10-CM

## 2023-11-15 PROCEDURE — 97110 THERAPEUTIC EXERCISES: CPT

## 2023-11-15 PROCEDURE — 97112 NEUROMUSCULAR REEDUCATION: CPT

## 2023-11-15 NOTE — PROGRESS NOTES
Daily Note      Today's date: 11/15/2023  Patient name: Rc Escamilla  : 1960  MRN: [id number]  Referring provider: Ariane Gomez MD  Dx:   Encounter Diagnosis     ICD-10-CM    1. Recurrent dislocation of left patella  M22.02           Subjective: Pt reports increased pain in the front of both legs (points to quads region). Objective: See treatment diary below    Assessment: Pt tolerated treatment well. Educated pt in differentiating muscle soreness vs. pain. Pt attributed pain to increasing strengthening exercises last visit. Pt performed modified treatment session with increased flexibility exercise and tolerated well. Plan: Continue per plan of care. Progress treatment as tolerated.           Insurance:  AMA/CMS Eval/ Re-eval POC expires FOTO Auth Status Co-Insurance   AMA - aetna 23  None req                                           1.  9 2.   10/2 3.   10/4 4.   10/9 5.   10/ 6.   10   7.   10/23 8.   10/30 PN 9.    10.    11.    12.   11/15   13.  14.  15.  16.  17.  18.    19.  20. 21. 22. 23. 24.    25.  26. 27. 28. 29. 30.   31. 32.  33. 34. 35. 36.        Precautions: Recent patellar dislocation       7 8 - PN 9 10 11 12   Manuals 10/23/23 10/30/23 11/1/23 11/6/23 11/13/23 11/15/23   STM/MFR         Manual flexibility HS, gastrocs, quads         Joint mobs knee         Neuro Re-Ed         Quad sets 2x10 (5s)  X10-15 total  2x10 (5s)      SLR 2x8 2x10 on R  2x10  2x10  2x10  2x10   Glute sets         LAQ 2x10   2x10  2x10  2x10   Clamshells         Bridging 2x8 2x10 w/ ad sq 2x10  2x10      Sit-to-stand Elevated plinth 2x8  Same x15 total w/ red tband around knees Elevated plinth (19") 3x8  Plinth at 19" 3x8 Chair 3x8  Plinth at 21" 3x8   Step up  2x10 4" step  6" x20  20x 4" step 20x 6" step 20x 6" step    Glute med heel tap 15x B/L 4"  6" x20 20x 4" step 20x 6" step      Hip abd side step  Red 15'x8       Squat at bar   2x10 2x10       HEP and POC rev  X5 mins       Ther Ex         Nustep  5 min lvl 2-3 5 min lvl 2  5 min lvl 2  5 min lvl 2  Rec bike 6' L2   PROM L knee  X2-3 mins flex/ext       Heel slides 15x5s w/ peanut rev       Gastroc stretch   5x15s    5x15s   HS stretch  3x30 sec  5x15s   5x15s   Hip flexor stretch      5x15s B/L    Leg press  2x10 65# NV  --> 3x10 75#                                        Ther Activity         Pt education         Stairs         Squats         Gait                                    Modalities

## 2023-11-22 ENCOUNTER — APPOINTMENT (OUTPATIENT)
Dept: PHYSICAL THERAPY | Facility: CLINIC | Age: 63
End: 2023-11-22
Payer: COMMERCIAL

## 2023-11-27 ENCOUNTER — OFFICE VISIT (OUTPATIENT)
Dept: PHYSICAL THERAPY | Facility: CLINIC | Age: 63
End: 2023-11-27
Payer: COMMERCIAL

## 2023-11-27 DIAGNOSIS — M22.02 RECURRENT DISLOCATION OF LEFT PATELLA: Primary | ICD-10-CM

## 2023-11-27 PROCEDURE — 97110 THERAPEUTIC EXERCISES: CPT

## 2023-11-27 PROCEDURE — 97112 NEUROMUSCULAR REEDUCATION: CPT

## 2023-11-27 NOTE — PROGRESS NOTES
Daily Note      Today's date: 2023  Patient name: Gracie Hayden  : 1960  MRN: [id number]  Referring provider: Raulito Hernadez MD  Dx:   Encounter Diagnosis     ICD-10-CM    1. Recurrent dislocation of left patella  M22.02           Subjective: Pt reports that her left knee feels "cold" from walking in from outside. Pt states that when it is cold out, she feels like her left kneecap is more likely to "give out," but has not had any new episodes since she was last here. Pt reports that since last visit, she has been able to stop using her knee brace. Pt states that she has an MRI appointment pertaining to her back this Wednesday morning. Objective: See treatment diary below    Assessment: Pt tolerated treatment well. Pt noted increased back pain previously while performing SLR through controlled range with plinth flat. Pt noted slight improvement in tolerance with HOB elevated to ~30°. Pt resumed with leg press and step up exercises and tolerated well. Pt introduced to forward step downs and required minor use of hands on rails for proprioception. Pt noted no instability episodes during this session. Plan: Continue per plan of care. Progress treatment as tolerated.           Insurance:  AMA/CMS Eval/ Re-eval POC expires FOTO Auth Status Co-Insurance   AMA - aetna 23  None req                                           1.  9 2.   10/2 3.   10/4 4.   10/9 5.   10/11 6.   10/16   7.   10 8.   10/30 PN 9.   11/ 10.   11/ 11.    12.   11/15   13.    14.  15.  16.  17.  18.    19.  20. 21. 22. 23. 24.    25.  26. 27. 28. 29. 30.   31. 32.  33. 34. 35. 36.        Precautions: Recent patellar dislocation       9 10 11 12 13 14 (RE-EVAL)   Manuals 11/1/23 11/6/23 11/13/23 11/15/23 11/27/23    STM/MFR         Manual flexibility HS, gastrocs, quads         Joint mobs knee         Neuro Re-Ed         Quad sets  2x10 (5s)        SLR 2x10  2x10  2x10  2x10 2x10 Glute sets         LAQ  2x10  2x10  2x10 2x10     Clamshells         Bridging 2x10  2x10        Sit-to-stand Elevated plinth (19") 3x8  Plinth at 19" 3x8 Chair 3x8  Plinth at 21" 3x8 Plinth at 21" 3x8    Step up  20x 4" step 20x 6" step 20x 6" step  2x10 6" step    Glute med heel tap 20x 4" step 20x 6" step        Hip abd side step         Squat at bar  2x10         HEP and POC rev         Ther Ex         Nustep 5 min lvl 2  5 min lvl 2  5 min lvl 2  Rec bike 6' L2 Nustep 8' L2    PROM L knee         Heel slides         Gastroc stretch 5x15s    5x15s 5x15s    HS stretch 5x15s   5x15s 5x15s     Hip flexor stretch    5x15s B/L      Leg press   --> 3x10 75#  3x10 65#    Forward step down     15x 4" step                               Ther Activity         Pt education         Stairs         Squats         Gait                                    Modalities

## 2023-12-04 ENCOUNTER — EVALUATION (OUTPATIENT)
Dept: PHYSICAL THERAPY | Facility: CLINIC | Age: 63
End: 2023-12-04
Payer: COMMERCIAL

## 2023-12-04 DIAGNOSIS — M22.02 RECURRENT DISLOCATION OF LEFT PATELLA: Primary | ICD-10-CM

## 2023-12-04 PROCEDURE — 97110 THERAPEUTIC EXERCISES: CPT

## 2023-12-04 NOTE — PROGRESS NOTES
Progress Note     Today's date: 2023  Patient name: Fazal Dejesus  : 1960  MRN: [id number]  Referring provider: Chantal Ramirez MD  Dx:   Encounter Diagnosis     ICD-10-CM    1. Recurrent dislocation of left patella  M22.02                      Subjective: Pt reports continued progress since starting PT. Pt states that she is improving at being able to go up and down the stairs. Pt reports that she feels she can continue exercise independently by the end of the year. Pt states she does not plan to continue PT into the new year secondary to financial obligations. Goals  Short term goals (3 weeks):   1) Pt will improve L knee AROM to 0-125 pain free. - goal met  2) Pt will improve B LE and core strength deficits by 1/3 grade MMT. - goal met  3) Pt will reports pain at worse <5/10. - goal met  4) Pt will initiate and progress HEP w/ special emphasis on functional quad/hip strength, knee ROM. - goal met    Long term goals (6 weeks)   1) Pt will improve FOTO to at least 60. - goal met  2) Pt will sleep through the night in positioning of choosing 6/7 nights a week w/o waking due to pain. - in progress  3) Pt will stand and ambulate community distances w/ normalized gait pattern and pain <3/10. - in progress  4) Pt will be independent and compliant w/ HEP in order to maximize functional benefit of skilled PT following d/c. - in progress      Pain  Current pain ratin  At best pain ratin  At worst pain ratin  Location: L distal quad, along L adductors, along L lateral hip, deep in L knee under knee cap.   Quality: sharp and dull ache  Relieving factors: rest, medications, change in position and ice (J brace )  Aggravating factors: standing, walking, stair climbing, lifting and overhead activity (ambulation >10 mins)  Progression: improved since IE    Social Support  Steps to enter house: yes  Stairs in house: yes   Lives in: multiple-level home  Lives with: 16year old daughter. 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 )        Objective     Palpation     Additional Palpation Details  TTP through L lateral patellar border, along distal L quad tendon, along medial L thigh    -10/30: TTP through medial L knee and thigh, specifically around joint line    Neurological Testing     Sensation     Knee   Left Knee   Intact: light touch    Right Knee   Intact: light touch     Active Range of Motion   Left Knee   Flexion: 127 degrees   Extension: 0 degrees     Right Knee   Flexion: 130 degrees   Extension: 0 degrees     Passive Range of Motion   Left Knee   Flexion: 130 degrees   Extension: 0 degrees     Right Knee   Normal passive range of motion    Patellar Static Positioning   Left Knee: britt - improving    Additional Patellar Static Positioning Details  Hypomobile w/ poor tracking w/ all functional quad activities, little to no movement   -10/30: good tracking throughout w/ solid quad set   -12/4: good tracking throughout w/ solid quad set     Strength/Myotome Testing     Left Knee   Flexion: 4-  Extension: 4-    Right Knee   Flexion: 4  Extension: 4    Additional Strength Details  LE Strength (R/L)    Hip  4-4+/5 throughout    Core Strength: 2/5    *Indicates pain      Tests     Additional Tests Details  Deferred s/t knowledge of diagnosis     Ambulation     Ambulation: Level Surfaces     Additional Level Surfaces Ambulation Details  Decreased step length and decreased WB on L LE, decreased heel strike at IC, poor knee flexion through swing phase, fatigues quickly    -10/30: grossly unremarkable in clinic    -12/4: WNL    Functional Assessment        Comments  Sit<>stand: UE support on LE w/ R side WS, poor glute drive    -37/54: good  glute drive, can perform w/o UE support from slightly elevated surface.    -12/4: good glute drive, requires use of hands on knees to stand from standard chair; able to stand without UE support from elevated surface    BW squat: not past 25% before significant R side WS    -10/30: at least 50% w/o compensation, medial knee dive w/ greater depths, fair glute drive    -94/6: 59% depth with form WNL; apprehension about depth    Stairs: TBA - discussed    -10/30: at least 6" up/down w/ one sided UE support, fair glute drive   -47/3: step up 6" WNL B/L; step down WNL RLE, slight dynamic valgus with LLE step down    SLS: unable on L   -10/30: not tested    -12/4: L = 2.7, 3.3 seconds; R = 3.3, 4.5 seconds      Assessment: Tolerated treatment well. Patient demonstrated fatigue post treatment and would benefit from continued PT. Pt has been re-assessed following 8 skilled therapy visits. She has made excellent objective improvements in functional strength and L knee ROM, quality of functional motion, and self reports of pain. Her FOTO score has improved as well. She is motivated by progress and is appropriate for continued skilled progressions barring setback. Focus on loaded strength as sx allow. Plan: Continue per plan of care.      Patient would benefit from: skilled PT  Planned modality interventions: cryotherapy and thermotherapy: hydrocollator packs  Planned therapy interventions: abdominal trunk stabilization, activity modification, joint mobilization, manual therapy, massage, motor coordination training, neuromuscular re-education, patient education, postural training, stretching, strengthening, therapeutic activities, therapeutic exercise, therapeutic training, transfer training, home exercise program, gait training, graded activity, functional ROM exercises, graded exercise, flexibility, body mechanics training, balance and balance/weight bearing training  Frequency: 2x week  Duration in visits: 4  Duration in weeks: 3  Plan begins: 12/4/2023  Plan expires: 12/31/2023  Treatment plan discussed with: patient       Insurance:  AMA/CMS Eval/ Re-eval POC expires FOTO Auth Status Co-Insurance   AMA - aetna 9/27/23 12/27/23  None req 10/30/23        12/4/23 12/31/23                               1.  9/28 2.   10/2 3.   10/4 4.   10/9 5.   10/11 6.   10/16   7.   10/23 8.   10/30 PN 9.   11/1 10.   11/6 11.   11/13 12.   11/15   13.   11/27 14.   12/4 RE 15.   16.  17.  18.    19.  20. 21. 22. 23. 24.    25.  26. 27. 28. 29. 30.   31. 32.  33. 34. 35. 36.        Precautions: Recent patellar dislocation       9 10 11 12 13 14 (RE-EVAL)   Manuals 11/1/23 11/6/23 11/13/23 11/15/23 11/27/23 12/4/23   STM/MFR         Manual flexibility HS, gastrocs, quads         Joint mobs knee         Neuro Re-Ed         Quad sets  2x10 (5s)        SLR 2x10  2x10  2x10  2x10 2x10     Glute sets         LAQ  2x10  2x10  2x10 2x10     Clamshells         Bridging 2x10  2x10        Sit-to-stand Elevated plinth (19") 3x8  Plinth at 19" 3x8 Chair 3x8  Plinth at 21" 3x8 Plinth at 21" 3x8    Step up  20x 4" step 20x 6" step 20x 6" step  2x10 6" step    Glute med heel tap 20x 4" step 20x 6" step        Hip abd side step         Squat at bar  2x10         HEP and POC rev         Ther Ex         Nustep 5 min lvl 2  5 min lvl 2  5 min lvl 2  Rec bike 6' L2 Nustep 8' L2 Nustep 5' L2   PROM L knee      Flex/ext; reassessment x 25'    Heel slides         Gastroc stretch 5x15s    5x15s 5x15s    HS stretch 5x15s   5x15s 5x15s     Hip flexor stretch    5x15s B/L      Leg press   --> 3x10 75#  3x10 65#    Forward step down     15x 4" step                               Ther Activity         Pt education         Stairs         Squats         Gait                                    Modalities

## 2023-12-06 ENCOUNTER — OFFICE VISIT (OUTPATIENT)
Dept: OBGYN CLINIC | Facility: CLINIC | Age: 63
End: 2023-12-06
Payer: COMMERCIAL

## 2023-12-06 ENCOUNTER — OFFICE VISIT (OUTPATIENT)
Dept: PHYSICAL THERAPY | Facility: CLINIC | Age: 63
End: 2023-12-06
Payer: COMMERCIAL

## 2023-12-06 VITALS
DIASTOLIC BLOOD PRESSURE: 74 MMHG | BODY MASS INDEX: 29.02 KG/M2 | SYSTOLIC BLOOD PRESSURE: 122 MMHG | WEIGHT: 170 LBS | HEIGHT: 64 IN | HEART RATE: 70 BPM

## 2023-12-06 DIAGNOSIS — M22.02 RECURRENT DISLOCATION OF LEFT PATELLA: Primary | ICD-10-CM

## 2023-12-06 DIAGNOSIS — M25.362 PATELLAR INSTABILITY OF LEFT KNEE: Primary | ICD-10-CM

## 2023-12-06 PROCEDURE — 97112 NEUROMUSCULAR REEDUCATION: CPT

## 2023-12-06 PROCEDURE — 99213 OFFICE O/P EST LOW 20 MIN: CPT | Performed by: ORTHOPAEDIC SURGERY

## 2023-12-06 PROCEDURE — 97110 THERAPEUTIC EXERCISES: CPT

## 2023-12-06 RX ORDER — NALOXONE HYDROCHLORIDE 4 MG/.1ML
SPRAY NASAL
COMMUNITY
Start: 2023-11-24

## 2023-12-06 RX ORDER — METHOCARBAMOL 750 MG/1
750 TABLET, FILM COATED ORAL EVERY 6 HOURS PRN
COMMUNITY
Start: 2023-11-29

## 2023-12-06 NOTE — PROGRESS NOTES
Daily Note      Today's date: 2023  Patient name: Rosalinda Birmingham  : 1960  MRN: [id number]  Referring provider: Zac Otero MD  Dx:   Encounter Diagnosis     ICD-10-CM    1. Recurrent dislocation of left patella  M22.02           Subjective: Pt reports that she followed up with orthopedics and states MD concurred that her strength has improved and is nearly symmetrical. Pt and orthopedic surgeon were in agreement that pt will continue with PT until the end of the year and then discharge to home exercise. Objective: See treatment diary below    Assessment: Pt tolerated treatment well. Pt demonstrated improved eccentric control with forward step downs and minimal knee pain at 6" step. Pt also introduced to lateral walks and noted more rapid fatigue in L glute medius compared to R. Plan: Continue per plan of care. Progress treatment as tolerated.           Insurance:  AMA/CMS Eval/ Re-eval POC expires FOTO Auth Status Co-Insurance   AMA - aetna 23  None req     10/30/23        12/4/23 12/31/23                               1.  928 2.   10/2 3.   10/4 4.   10/9 5.   10/11 6.   10/16   7.   10 8.   10 PN 9.   11/ 10.    11.    12.   11/15   13.    14.    RE 15.    16.  17.  18.    19.  20. 21. 22. 23. 24.    25.  26. 27. 28. 29. 30.   31. 32.  33. 34. 35. 36.        Precautions: Recent patellar dislocation       11 12 13 14 (RE-EVAL) 15    Manuals 11/13/23 11/15/23 11/27/23 12/4/23 12/6/23    STM/MFR         Manual flexibility HS, gastrocs, quads         Joint mobs knee         Neuro Re-Ed         Quad sets         SLR 2x10  2x10 2x10   2x10    Glute sets         LAQ 2x10  2x10 2x10       Clamshells         Bridging         Sit-to-stand Chair 3x8  Plinth at 21" 3x8 Plinth at 21" 3x8  Plinth at 20" 2x10    Step up  20x 6" step  2x10 6" step  2x10 6" step    Glute med heel tap         Lateral walks     4x15 ft RTB at knees    Squat at bar Ther Ex         Nustep 5 min lvl 2  Rec bike 6' L2 Nustep 8' L2 Nustep 5' L2 Rec bike 6' L3    PROM L knee    Flex/ext; reassessment x 25'      Heel slides         Gastroc stretch  5x15s 5x15s      HS stretch  5x15s 5x15s   5x15s    Hip flexor stretch  5x15s B/L        Leg press  3x10 75#  3x10 65#  3x10 75#    Forward step down   15x 4" step  2x10 6" step                               Ther Activity         Pt education         Stairs         Squats         Gait                                    Modalities

## 2023-12-06 NOTE — PROGRESS NOTES
Assessment/Plan:  1. Patellar instability of left knee          The patient continues to make progress with her knee and her patella feels quite stable on examination today. She will continue PT through this year, and then will transition to home exercises. I do not advise any surgical intervention at this point. She will FU as needed. Subjective:   Radha Jackson is a 70 y.o. female who presents today for follow-up of her left knee. I have been treating her conservatively for patellar instability about this knee. She feels she is making progress with physical therapy. She denies any recent patellar instability episodes and notes her pain is improving. SHe still notes some anteromedial knee pain at times though. She notes good ROM and improving strength, and denies any paresthesias of the lower extremity. Review of Systems   Constitutional: Negative. Negative for chills and fever. HENT: Negative. Negative for ear pain and sore throat. Eyes: Negative. Negative for pain and redness. Respiratory: Negative. Negative for shortness of breath and wheezing. Cardiovascular:  Negative for chest pain and palpitations. Gastrointestinal: Negative. Negative for abdominal pain and blood in stool. Endocrine: Negative. Negative for polydipsia and polyuria. Genitourinary: Negative. Negative for difficulty urinating and dysuria. Musculoskeletal:         As noted in HPI   Skin: Negative. Negative for pallor and rash. Neurological: Negative. Negative for dizziness and numbness. Hematological: Negative. Negative for adenopathy. Does not bruise/bleed easily. Psychiatric/Behavioral: Negative. Negative for confusion and suicidal ideas.           Past Medical History:   Diagnosis Date   • Arthritis    • Disease of thyroid gland    • History of transfusion        Past Surgical History:   Procedure Laterality Date   • APPENDECTOMY     • HERNIA REPAIR     • OOPHORECTOMY Right        No family history on file. Social History     Occupational History   • Not on file   Tobacco Use   • Smoking status: Former     Types: Cigarettes     Quit date:      Years since quittin.9   • Smokeless tobacco: Never   Vaping Use   • Vaping Use: Never used   Substance and Sexual Activity   • Alcohol use: Never     Alcohol/week: 0.0 standard drinks of alcohol   • Drug use: Not Currently     Types: Marijuana   • Sexual activity: Not Currently         Current Outpatient Medications:   •  methocarbamol (ROBAXIN) 750 mg tablet, Take 750 mg by mouth every 6 (six) hours as needed, Disp: , Rfl:   •  naloxone (NARCAN) 4 mg/0.1 mL nasal spray, ADMINISTER A SINGLE SPRAY INTRANASALLY INTO ONE NOSTRIL. CALL 911.  MAY REPEAT X1., Disp: , Rfl:   •  acetaminophen (TYLENOL) 325 mg tablet, Take 2 tablets (650 mg total) by mouth every 6 (six) hours as needed for mild pain, headaches or fever (Patient not taking: Reported on 2022), Disp: 30 tablet, Rfl: 0  •  cyclobenzaprine (FLEXERIL) 10 mg tablet, Take 10 mg by mouth 3 (three) times a day as needed for muscle spasms (Patient not taking: Reported on 2022), Disp: , Rfl:   •  dextromethorphan-guaiFENesin (ROBITUSSIN DM)  mg/5 mL syrup, Take 10 mL by mouth 3 (three) times a day as needed for cough (Patient not taking: Reported on 2022), Disp: 118 mL, Rfl: 0  •  diazepam (VALIUM) 2 mg tablet, Take 2 mg by mouth every 6 (six) hours as needed for anxiety (Patient not taking: Reported on 2022), Disp: , Rfl:   •  dicyclomine (BENTYL) 20 mg tablet, Take 1 tablet (20 mg total) by mouth 2 (two) times a day (Patient not taking: Reported on 2023), Disp: 20 tablet, Rfl: 0  •  famotidine (PEPCID) 20 mg tablet, Take 1 tablet (20 mg total) by mouth daily (Patient not taking: Reported on 2023), Disp: 30 tablet, Rfl: 0  •  HYDROcodone-acetaminophen (NORCO) 7.5-325 mg per tablet, Take 1 tablet by mouth every 8 (eight) hours as needed for pain, Disp: , Rfl:   •  ibuprofen (MOTRIN) 100 mg/5 mL suspension, Take by mouth if needed for mild pain, Disp: , Rfl:   •  ondansetron (ZOFRAN) 4 mg tablet, Take 1 tablet (4 mg total) by mouth every 6 (six) hours (Patient not taking: Reported on 4/23/2023), Disp: 12 tablet, Rfl: 0  •  ondansetron (ZOFRAN-ODT) 8 mg disintegrating tablet, DISSOLVE 1 TABLET IN MOUTH EVERY 12 HOURS AND PLACE ON THE TOP OF THE TONGUE WHERE IT WILL DISSOLVE, THEN SWALLOW, Disp: , Rfl:   •  oxyCODONE-acetaminophen (PERCOCET) 5-325 mg per tablet, Take 1 tablet by mouth every 4 (four) hours as needed for moderate pain (Patient not taking: Reported on 4/23/2023), Disp: , Rfl:     No Known Allergies    Objective:  Vitals:    12/06/23 1749   BP: 122/74   Pulse: 70     Pain Score:   1      Left Knee Exam     Tenderness   The patient is experiencing no tenderness. Range of Motion   Extension:  0   Flexion:  120     Tests   Varus: negative Valgus: negative  Patellar apprehension: negative    Other   Erythema: absent  Sensation: normal  Pulse: present  Swelling: none  Effusion: no effusion present    Comments:  4+/5 strength knee extension          Observations   Left Knee   Negative for effusion. Physical Exam  Constitutional:       General: She is not in acute distress. Appearance: She is well-developed. HENT:      Head: Normocephalic and atraumatic. Eyes:      General: No scleral icterus. Conjunctiva/sclera: Conjunctivae normal.   Neck:      Vascular: No JVD. Cardiovascular:      Rate and Rhythm: Normal rate. Pulmonary:      Effort: Pulmonary effort is normal. No respiratory distress. Musculoskeletal:      Left knee: No effusion. Comments: As per HPI   Skin:     General: Skin is warm. Neurological:      Mental Status: She is alert and oriented to person, place, and time. Coordination: Coordination normal.             This document was created using speech voice recognition software.    Grammatical errors, random word insertions, pronoun errors, and incomplete sentences are an occasional consequence of this system due to software limitations, ambient noise, and hardware issues. Any formal questions or concerns about content, text, or information contained within the body of this dictation should be directly addressed to the provider for clarification.

## 2023-12-11 ENCOUNTER — OFFICE VISIT (OUTPATIENT)
Dept: PHYSICAL THERAPY | Facility: CLINIC | Age: 63
End: 2023-12-11
Payer: COMMERCIAL

## 2023-12-11 DIAGNOSIS — M22.02 RECURRENT DISLOCATION OF LEFT PATELLA: Primary | ICD-10-CM

## 2023-12-11 PROCEDURE — 97112 NEUROMUSCULAR REEDUCATION: CPT

## 2023-12-11 NOTE — PROGRESS NOTES
Daily Note      Today's date: 2023  Patient name: Abimbola Tyler  : 1960  MRN: [id number]  Referring provider: Felipe Hurst MD  Dx:   Encounter Diagnosis     ICD-10-CM    1. Recurrent dislocation of left patella  M22.02           Subjective: Pt reports no new complaints in the left knee. Objective: See treatment diary below    Assessment: Pt tolerated treatment well. Pt progressed to lateral walks with theraband and was provided with theraband for HEP. Pt demonstrates good progress toward discharge. Plan: Continue per plan of care. Progress treatment as tolerated.           Insurance:  AMA/CMS Eval/ Re-eval POC expires FOTO Auth Status Co-Insurance   AMA - aetna 23  None req     10/30/23        12/4/23 12/31/23                               1.  9 2.   10/2 3.   10/4 4.   10/9 5.   10/11 6.   1016   7.   10 8.   10 PN 9.   11 10.    11.    12.   11/15   13.    14.   12 RE 15.    16.    17.  18.    19.  20. 21. 22. 23. 24.    25.  26. 27. 28. 29. 30.   31. 32.  33. 34. 35. 36.        Precautions: Recent patellar dislocation       11 12 13 14 (RE-EVAL) 15 16   Manuals 11/13/23 11/15/23 11/27/23 12/4/23 12/6/23 12/11/23   STM/MFR         Manual flexibility HS, gastrocs, quads         Joint mobs knee         Neuro Re-Ed         Quad sets         SLR 2x10  2x10 2x10   2x10 2x10   Glute sets         LAQ 2x10  2x10 2x10    2x10   Clamshells         Bridging         Sit-to-stand Chair 3x8  Plinth at 21" 3x8 Plinth at 21" 3x8  Plinth at 20" 2x10 Plinth at 20" 2x10   Step up  20x 6" step  2x10 6" step  2x10 6" step 2x10 6" step   Glute med heel tap         Lateral walks     4x15 ft RTB at knees 2x15 GTB at knees extended  2x15 w/ mini squat   Squat at bar                   Ther Ex         Nustep 5 min lvl 2  Rec bike 6' L2 Nustep 8' L2 Nustep 5' L2 Rec bike 6' L3 Nustep 8' L2   PROM L knee    Flex/ext; reassessment x 25'      Heel slides Gastroc stretch  5x15s 5x15s      HS stretch  5x15s 5x15s   5x15s 5x15s    Hip flexor stretch  5x15s B/L        Leg press  3x10 75#  3x10 65#  3x10 75# 3x10 75#   Forward step down   15x 4" step  2x10 6" step 2x10 6" step                              Ther Activity         Pt education         Stairs         Squats         Gait                                    Modalities

## 2023-12-13 ENCOUNTER — OFFICE VISIT (OUTPATIENT)
Facility: CLINIC | Age: 63
End: 2023-12-13
Payer: COMMERCIAL

## 2023-12-13 DIAGNOSIS — M22.02 RECURRENT DISLOCATION OF LEFT PATELLA: Primary | ICD-10-CM

## 2023-12-13 PROCEDURE — 97110 THERAPEUTIC EXERCISES: CPT

## 2023-12-13 NOTE — PROGRESS NOTES
Daily Note      Today's date: 2023  Patient name: Fang Schreiber  : 1960  MRN: [id number]  Referring provider: Anabelle Nascimento MD  Dx:   Encounter Diagnosis     ICD-10-CM    1. Recurrent dislocation of left patella  M22.02           Subjective: Pt reports mild pain on L knee. Objective: See treatment diary below    Assessment: Pt tolerated treatment well. Patient demonstrated good control with forward step down suggesting good eccentric quadriceps control. Pt demonstrates good progress towards discharge. Plan: Continue per plan of care. Progress treatment as tolerated.           Insurance:  AMA/CMS Eval/ Re-eval POC expires FOTO Auth Status Co-Insurance   AMA - aetna 23  None req     10/30/23        12/4/23 12/31/23                               1.  9 2.   10/2 3.   10 4.   10/9 5.   10/11 6.   1016   7.   10/23 8.   10/30 PN 9.   11 10.    11.    12.   11/15   13.    14.   12 RE 15.    16.    17.    18.    19.  20. 21. 22. 23. 24.    25.  26. 27. 28. 29. 30.   31. 32.  33. 34. 35. 36.        Precautions: Recent patellar dislocation       17 12 13 14 (RE-EVAL) 15 16   Manuals 12/13/23 11/15/23 11/27/23 12/4/23 12/6/23 12/11/23   STM/MFR         Manual flexibility HS, gastrocs, quads         Joint mobs knee         Neuro Re-Ed         Quad sets         SLR  2x10 2x10   2x10 2x10   Glute sets         LAQ 2x10  2x10 2x10    2x10   Clamshells         Bridging         Sit-to-stand Chair 3x15 Plinth at 21" 3x8 Plinth at 21" 3x8  Plinth at 20" 2x10 Plinth at 20" 2x10   Step up  20x 6" step  2x10 6" step  2x10 6" step 2x10 6" step   Glute med heel tap         Lateral walks 2x15 GTB at knees extended  2x15 w/ mini squat    4x15 ft RTB at knees 2x15 GTB at knees extended  2x15 w/ mini squat   Squat at bar                   Ther Ex         Nustep Nustep 8' L2 Rec bike 6' L2 Nustep 8' L2 Nustep 5' L2 Rec bike 6' L3 Nustep 8' L2   PROM L knee Flex/ext; reassessment x 25'      Heel slides         Gastroc stretch  5x15s 5x15s      HS stretch 5x15s  5x15s 5x15s   5x15s 5x15s    Hip flexor stretch  5x15s B/L        Leg press  3x10 75#  3x10 65#  3x10 75# 3x10 75#   Forward step down 2x10 6" step  15x 4" step  2x10 6" step 2x10 6" step                              Ther Activity         Pt education         Stairs         Squats         Gait                                    Modalities

## 2023-12-18 ENCOUNTER — OFFICE VISIT (OUTPATIENT)
Dept: PHYSICAL THERAPY | Facility: CLINIC | Age: 63
End: 2023-12-18
Payer: COMMERCIAL

## 2023-12-18 DIAGNOSIS — M22.02 RECURRENT DISLOCATION OF LEFT PATELLA: Primary | ICD-10-CM

## 2023-12-18 PROCEDURE — 97110 THERAPEUTIC EXERCISES: CPT

## 2023-12-18 PROCEDURE — 97112 NEUROMUSCULAR REEDUCATION: CPT

## 2023-12-18 NOTE — PROGRESS NOTES
"Daily Note      Today's date: 2023  Patient name: Vaishnavi Foster  : 1960  MRN: [id number]  Referring provider: Christopher Fisher MD  Dx:   Encounter Diagnosis     ICD-10-CM    1. Recurrent dislocation of left patella  M22.02           Subjective: Pt reports no new complaints. Pt states that she is still planning to conclude PT at the end of this week.        Objective: See treatment diary below    Assessment: Pt tolerated treatment well.  Pt required use of hands in lap to perform sit-to-stand from chair. Pt was educated that her function may fluctuate from day to day, but the key is to perform exercise at a level that is challenging for that particular day. Pt verbalized understanding.  Pt required verbal cuing and demonstration to limit anterior translation of both knees during squat. Pt verbalized understanding and plan to re-assess next visit.     Plan: Continue per plan of care.  Plan to discharge to [facility] next session.        Insurance:  AMA/CMS Eval/ Re-eval POC expires FOTO Auth Status Co-Insurance   AMA - aetna 23  None req     10/30/23        12/4/23 12/31/23                               1.  9 2.   10/2 3.   10/4 4.   10/9 5.   10/11 6.   10/16   7.   10 8.   10 PN 9.   11/ 10.   11/6 11.    12.   /15   13.    14.   12/4 RE 15.   12/6 16.   12/11 17.   12/13 18.      19.  20. 21. 22. 23. 24.    25.  26. 27. 28. 29. 30.   31. 32.  33. 34. 35. 36.        Precautions: Recent patellar dislocation       17 18       Manuals 23       STM/MFR         Manual flexibility HS, gastrocs, quads         Joint mobs knee         Neuro Re-Ed         Quad sets         SLR  2x10       Glute sets         LAQ 2x10  2x10       Clamshells         Bridging         Sit-to-stand Chair 3x15 Chair 2x10        Step up  20x 6\" step 20x 6\" step       Glute med heel tap         Lateral walks 2x15 GTB at knees extended  2x15 w/ mini squat        Squat at bar   2x10    " "             Ther Ex         Nustep Nustep 8' L2 Nustep 8' L2       PROM L knee         Heel slides         Gastroc stretch         HS stretch 5x15s  5x15s       Hip flexor stretch         Leg press  3x10 75#        Forward step down 2x10 6\" step 2x10 6\" step                                   Ther Activity         Pt education  D/C planning       Stairs         Squats         Gait                                    Modalities                                          "

## 2023-12-20 ENCOUNTER — OFFICE VISIT (OUTPATIENT)
Dept: PHYSICAL THERAPY | Facility: CLINIC | Age: 63
End: 2023-12-20
Payer: COMMERCIAL

## 2023-12-20 DIAGNOSIS — M22.02 RECURRENT DISLOCATION OF LEFT PATELLA: Primary | ICD-10-CM

## 2023-12-20 PROCEDURE — 97530 THERAPEUTIC ACTIVITIES: CPT

## 2023-12-20 NOTE — PROGRESS NOTES
PT Discharge      Today's date: 2023  Patient name: Vaishnavi Foster  : 1960  MRN: [id number]  Referring provider: Christopher Fisher MD  Dx:   Encounter Diagnosis     ICD-10-CM    1. Recurrent dislocation of left patella  M22.02           Subjective: Pt reports that she wishes to go over her home exercise program for discharge, but defers performing this program today.        Objective: See treatment diary below    Assessment: Vaishnavi has attended hospital-based outpatient PT for 19 visits for chronic left knee pain consistent with referring diagnosis of recurrent dislocation of left patella. Patient has demonstrated good progression in PT and has met all PT goals. Patient continues to present with limited muscle power and endurance, however is able to perform all ADLs without modification and has achieved independence with comprehensive HEP.     Goals  Short term goals (3 weeks):   1) Pt will improve L knee AROM to 0-125 pain free. - goal met  2) Pt will improve B LE and core strength deficits by 1/3 grade MMT. - goal met  3) Pt will reports pain at worse <5/10. - goal met  4) Pt will initiate and progress HEP w/ special emphasis on functional quad/hip strength, knee ROM. - goal met    Long term goals (6 weeks)   1) Pt will improve FOTO to at least 60. - goal met  2) Pt will sleep through the night in positioning of choosing 6/7 nights a week w/o waking due to pain. - goal met  3) Pt will stand and ambulate community distances w/ normalized gait pattern and pain <3/10. - goal met  4) Pt will be independent and compliant w/ HEP in order to maximize functional benefit of skilled PT following d/c. - goal met    Plan:  Discharge from PT.           Insurance:  AMA/CMS Eval/ Re-eval POC expires FOTO Auth Status Co-Insurance   AMA - aetna 23  None req     10/30/23        12/4/23 12/31/23                               1.  9 2.   10/2 3.   10/4 4.   10/9 5.   10/11 6.   10/16   7.   10/23 8.  "  10/30 PN 9.   11/1 10.   11/6 11.   11/13 12.   11/15   13.   11/27 14.   12/4 RE 15.   12/6 16.   12/11 17.   12/13 18.   12/18   19.   12/20 20. 21. 22. 23. 24.    25.  26. 27. 28. 29. 30.   31. 32.  33. 34. 35. 36.        Precautions: Recent patellar dislocation       17 18 19      Manuals 12/13/23 12/18/23 12/20/23      STM/MFR         Manual flexibility HS, gastrocs, quads         Joint mobs knee         Neuro Re-Ed         Quad sets         SLR  2x10       Glute sets         LAQ 2x10  2x10       Clamshells         Bridging         Sit-to-stand Chair 3x15 Chair 2x10        Step up  20x 6\" step 20x 6\" step       Glute med heel tap         Lateral walks 2x15 GTB at knees extended  2x15 w/ mini squat        Squat at bar   2x10                 Ther Ex         Nustep Nustep 8' L2 Nustep 8' L2       PROM L knee         Heel slides         Gastroc stretch         HS stretch 5x15s  5x15s       Hip flexor stretch         Leg press  3x10 75#        Forward step down 2x10 6\" step 2x10 6\" step                                   Ther Activity         Pt education  D/C planning Comprehensive HEP and self-management x 10'       Stairs         Squats         Gait                                    Modalities                             Access Code: [email or web address]  URL: [email or web address]  Date: 12/20/2023  Prepared by: Ramon Mcdaniel    Exercises  - Hooklying Hamstring Stretch with Strap  - 1 x daily - 5 x weekly - 1 sets - 5 reps - 15 hold  - Active Straight Leg Raise with Quad Set  - 1 x daily - 5 x weekly - 2 sets - 10 reps - 1 hold  - Seated Knee Extension  - 1 x daily - 5 x weekly - 2 sets - 10 reps - 3 hold  - Supine Bridge  - 1 x daily - 3 x weekly - 2 sets - 10 reps - 3 hold  - Sit to Stand with Counter Support  - 1 x daily - 3 x weekly - 2 sets - 10 reps  - Step Up  - 1 x daily - 3 x weekly - 2 sets - 10 reps  - Side Step Down with Counter Support  - 1 x daily - 3 x weekly - 2 sets - 10 reps  - Forward Step " Down with Heel Tap and Counter Support  - 1 x daily - 3 x weekly - 2 sets - 10 reps

## 2024-02-21 PROBLEM — J18.9 PNEUMONIA INVOLVING RIGHT LUNG: Status: RESOLVED | Noted: 2019-10-22 | Resolved: 2024-02-21

## 2024-02-28 ENCOUNTER — TELEPHONE (OUTPATIENT)
Age: 64
End: 2024-02-28

## 2024-02-28 NOTE — TELEPHONE ENCOUNTER
Caller: Maria Elena nurse  Augustina    Doctor: Sourav    Reason for call: Checking on patient to see if she is still treating with the DR    Call back#: [phone]

## 2024-10-21 ENCOUNTER — APPOINTMENT (EMERGENCY)
Dept: RADIOLOGY | Facility: HOSPITAL | Age: 64
End: 2024-10-21
Payer: COMMERCIAL

## 2024-10-21 ENCOUNTER — HOSPITAL ENCOUNTER (EMERGENCY)
Facility: HOSPITAL | Age: 64
Discharge: HOME | End: 2024-10-21
Attending: EMERGENCY MEDICINE
Payer: COMMERCIAL

## 2024-10-21 VITALS
TEMPERATURE: 98.3 F | RESPIRATION RATE: 18 BRPM | SYSTOLIC BLOOD PRESSURE: 128 MMHG | HEART RATE: 74 BPM | OXYGEN SATURATION: 98 % | DIASTOLIC BLOOD PRESSURE: 76 MMHG

## 2024-10-21 DIAGNOSIS — K52.9 COLITIS: Primary | ICD-10-CM

## 2024-10-21 DIAGNOSIS — K57.90 DIVERTICULOSIS: ICD-10-CM

## 2024-10-21 LAB
2HR DELTA HS TROPONIN: 0 NG/L
ALBUMIN SERPL BCG-MCNC: 4.1 G/DL (ref 3.5–5)
ALP SERPL-CCNC: 70 U/L (ref 34–104)
ALT SERPL W P-5'-P-CCNC: 14 U/L (ref 7–52)
ANION GAP SERPL CALCULATED.3IONS-SCNC: 5 MMOL/L (ref 4–13)
AST SERPL W P-5'-P-CCNC: 18 U/L (ref 13–39)
BACTERIA UR QL AUTO: ABNORMAL /HPF
BASOPHILS # BLD AUTO: 0.02 THOUSANDS/ΜL (ref 0–0.1)
BASOPHILS NFR BLD AUTO: 0 % (ref 0–1)
BILIRUB SERPL-MCNC: 0.74 MG/DL (ref 0.2–1)
BILIRUB UR QL STRIP: NEGATIVE
BUN SERPL-MCNC: 14 MG/DL (ref 5–25)
CALCIUM SERPL-MCNC: 8.6 MG/DL (ref 8.4–10.2)
CARDIAC TROPONIN I PNL SERPL HS: 3 NG/L
CARDIAC TROPONIN I PNL SERPL HS: 3 NG/L
CHLORIDE SERPL-SCNC: 103 MMOL/L (ref 96–108)
CLARITY UR: ABNORMAL
CO2 SERPL-SCNC: 29 MMOL/L (ref 21–32)
COLOR UR: YELLOW
CREAT SERPL-MCNC: 0.94 MG/DL (ref 0.6–1.3)
EOSINOPHIL # BLD AUTO: 0.04 THOUSAND/ΜL (ref 0–0.61)
EOSINOPHIL NFR BLD AUTO: 1 % (ref 0–6)
ERYTHROCYTE [DISTWIDTH] IN BLOOD BY AUTOMATED COUNT: 12.9 % (ref 11.6–15.1)
GFR SERPL CREATININE-BSD FRML MDRD: 63 ML/MIN/1.73SQ M
GLUCOSE SERPL-MCNC: 102 MG/DL (ref 65–140)
GLUCOSE UR STRIP-MCNC: NEGATIVE MG/DL
HCT VFR BLD AUTO: 43.5 % (ref 34.8–46.1)
HGB BLD-MCNC: 14 G/DL (ref 11.5–15.4)
HGB UR QL STRIP.AUTO: ABNORMAL
IMM GRANULOCYTES # BLD AUTO: 0.02 THOUSAND/UL (ref 0–0.2)
IMM GRANULOCYTES NFR BLD AUTO: 0 % (ref 0–2)
KETONES UR STRIP-MCNC: ABNORMAL MG/DL
LEUKOCYTE ESTERASE UR QL STRIP: ABNORMAL
LIPASE SERPL-CCNC: 22 U/L (ref 11–82)
LYMPHOCYTES # BLD AUTO: 1.12 THOUSANDS/ΜL (ref 0.6–4.47)
LYMPHOCYTES NFR BLD AUTO: 24 % (ref 14–44)
MAGNESIUM SERPL-MCNC: 2 MG/DL (ref 1.9–2.7)
MCH RBC QN AUTO: 28.7 PG (ref 26.8–34.3)
MCHC RBC AUTO-ENTMCNC: 32.2 G/DL (ref 31.4–37.4)
MCV RBC AUTO: 89 FL (ref 82–98)
MONOCYTES # BLD AUTO: 0.34 THOUSAND/ΜL (ref 0.17–1.22)
MONOCYTES NFR BLD AUTO: 7 % (ref 4–12)
MUCOUS THREADS UR QL AUTO: ABNORMAL
NEUTROPHILS # BLD AUTO: 3.21 THOUSANDS/ΜL (ref 1.85–7.62)
NEUTS SEG NFR BLD AUTO: 68 % (ref 43–75)
NITRITE UR QL STRIP: NEGATIVE
NON-SQ EPI CELLS URNS QL MICRO: ABNORMAL /HPF
NRBC BLD AUTO-RTO: 0 /100 WBCS
PH UR STRIP.AUTO: 6 [PH]
PLATELET # BLD AUTO: 209 THOUSANDS/UL (ref 149–390)
PMV BLD AUTO: 9.4 FL (ref 8.9–12.7)
POTASSIUM SERPL-SCNC: 3.8 MMOL/L (ref 3.5–5.3)
PROT SERPL-MCNC: 6.5 G/DL (ref 6.4–8.4)
PROT UR STRIP-MCNC: ABNORMAL MG/DL
RBC # BLD AUTO: 4.87 MILLION/UL (ref 3.81–5.12)
RBC #/AREA URNS AUTO: ABNORMAL /HPF
SODIUM SERPL-SCNC: 137 MMOL/L (ref 135–147)
SP GR UR STRIP.AUTO: >=1.03 (ref 1–1.03)
UROBILINOGEN UR STRIP-ACNC: <2 MG/DL
WBC # BLD AUTO: 4.75 THOUSAND/UL (ref 4.31–10.16)
WBC #/AREA URNS AUTO: ABNORMAL /HPF

## 2024-10-21 PROCEDURE — 36415 COLL VENOUS BLD VENIPUNCTURE: CPT | Performed by: EMERGENCY MEDICINE

## 2024-10-21 PROCEDURE — 87086 URINE CULTURE/COLONY COUNT: CPT | Performed by: EMERGENCY MEDICINE

## 2024-10-21 PROCEDURE — 84484 ASSAY OF TROPONIN QUANT: CPT | Performed by: EMERGENCY MEDICINE

## 2024-10-21 PROCEDURE — 99284 EMERGENCY DEPT VISIT MOD MDM: CPT

## 2024-10-21 PROCEDURE — 74177 CT ABD & PELVIS W/CONTRAST: CPT

## 2024-10-21 PROCEDURE — 83690 ASSAY OF LIPASE: CPT | Performed by: EMERGENCY MEDICINE

## 2024-10-21 PROCEDURE — 83735 ASSAY OF MAGNESIUM: CPT | Performed by: EMERGENCY MEDICINE

## 2024-10-21 PROCEDURE — 96374 THER/PROPH/DIAG INJ IV PUSH: CPT

## 2024-10-21 PROCEDURE — 85025 COMPLETE CBC W/AUTO DIFF WBC: CPT | Performed by: EMERGENCY MEDICINE

## 2024-10-21 PROCEDURE — 71045 X-RAY EXAM CHEST 1 VIEW: CPT

## 2024-10-21 PROCEDURE — 96375 TX/PRO/DX INJ NEW DRUG ADDON: CPT

## 2024-10-21 PROCEDURE — 93005 ELECTROCARDIOGRAM TRACING: CPT

## 2024-10-21 PROCEDURE — 99285 EMERGENCY DEPT VISIT HI MDM: CPT | Performed by: EMERGENCY MEDICINE

## 2024-10-21 PROCEDURE — 81001 URINALYSIS AUTO W/SCOPE: CPT | Performed by: EMERGENCY MEDICINE

## 2024-10-21 PROCEDURE — 80053 COMPREHEN METABOLIC PANEL: CPT | Performed by: EMERGENCY MEDICINE

## 2024-10-21 PROCEDURE — 96361 HYDRATE IV INFUSION ADD-ON: CPT

## 2024-10-21 RX ORDER — METRONIDAZOLE 500 MG/1
500 TABLET ORAL ONCE
Status: COMPLETED | OUTPATIENT
Start: 2024-10-21 | End: 2024-10-21

## 2024-10-21 RX ORDER — KETOROLAC TROMETHAMINE 30 MG/ML
15 INJECTION, SOLUTION INTRAMUSCULAR; INTRAVENOUS ONCE
Status: COMPLETED | OUTPATIENT
Start: 2024-10-21 | End: 2024-10-21

## 2024-10-21 RX ORDER — CIPROFLOXACIN 500 MG/1
500 TABLET, FILM COATED ORAL ONCE
Status: COMPLETED | OUTPATIENT
Start: 2024-10-21 | End: 2024-10-21

## 2024-10-21 RX ORDER — CIPROFLOXACIN 500 MG/1
500 TABLET, FILM COATED ORAL 2 TIMES DAILY
Qty: 20 TABLET | Refills: 0 | Status: SHIPPED | OUTPATIENT
Start: 2024-10-21 | End: 2024-10-31

## 2024-10-21 RX ORDER — ONDANSETRON 2 MG/ML
4 INJECTION INTRAMUSCULAR; INTRAVENOUS ONCE
Status: COMPLETED | OUTPATIENT
Start: 2024-10-21 | End: 2024-10-21

## 2024-10-21 RX ORDER — METRONIDAZOLE 500 MG/1
500 TABLET ORAL EVERY 8 HOURS SCHEDULED
Qty: 30 TABLET | Refills: 0 | Status: SHIPPED | OUTPATIENT
Start: 2024-10-21 | End: 2024-10-31

## 2024-10-21 RX ORDER — PANTOPRAZOLE SODIUM 40 MG/10ML
40 INJECTION, POWDER, LYOPHILIZED, FOR SOLUTION INTRAVENOUS ONCE
Status: COMPLETED | OUTPATIENT
Start: 2024-10-21 | End: 2024-10-21

## 2024-10-21 RX ADMIN — KETOROLAC TROMETHAMINE 15 MG: 30 INJECTION, SOLUTION INTRAMUSCULAR at 12:45

## 2024-10-21 RX ADMIN — CIPROFLOXACIN HYDROCHLORIDE 500 MG: 500 TABLET, FILM COATED ORAL at 15:47

## 2024-10-21 RX ADMIN — SODIUM CHLORIDE 1000 ML: 0.9 INJECTION, SOLUTION INTRAVENOUS at 12:45

## 2024-10-21 RX ADMIN — IOHEXOL 100 ML: 350 INJECTION, SOLUTION INTRAVENOUS at 13:34

## 2024-10-21 RX ADMIN — ONDANSETRON 4 MG: 2 INJECTION INTRAMUSCULAR; INTRAVENOUS at 12:45

## 2024-10-21 RX ADMIN — METRONIDAZOLE 500 MG: 500 TABLET ORAL at 15:47

## 2024-10-21 RX ADMIN — PANTOPRAZOLE SODIUM 40 MG: 40 INJECTION, POWDER, FOR SOLUTION INTRAVENOUS at 12:45

## 2024-10-21 NOTE — Clinical Note
Vaishnavi Foster was seen and treated in our emergency department on 10/21/2024.                Diagnosis:     Vaishnavi  may return to work on return date.    She may return on this date: 10/22/2024         If you have any questions or concerns, please don't hesitate to call.      Gustavo Bazan, ALESSANDRA    ______________________________           _______________          _______________  Hospital Representative                              Date                                Time

## 2024-10-21 NOTE — DISCHARGE INSTRUCTIONS
Return to the ER for further concerns or worsening symptoms  Follow up with your primary care physician and GI in 1-2 days  Take medication as prescribed

## 2024-10-21 NOTE — ED PROVIDER NOTES
Time reflects when diagnosis was documented in both MDM as applicable and the Disposition within this note       Time User Action Codes Description Comment    10/21/2024  3:18 PM Renay Dick Add [K52.9] Colitis     10/21/2024  3:19 PM OyesanmiJoaquinubusola O Add [K57.90] Diverticulosis           ED Disposition       ED Disposition   Discharge    Condition   Stable    Date/Time   Mon Oct 21, 2024  3:18 PM    Comment   Vaishnavi Foster discharge to home/self care.                   Assessment & Plan       Medical Decision Making  64-year-old female in the ED with complaint of abdominal pain.  Differential diagnosis includes but is not limited to colitis, diverticulitis, cystitis, constipation.  Labs, urinalysis, CT abdomen pelvis ordered and reviewed.  CT is positive for acute colitis with diverticulosis.  Will start patient on a course of Cipro and Flagyl.  Patient will be discharged to home.  Outpatient follow-up with gastroenterology strongly recommended.  Chest x-ray was ordered to address patient's concern for an aspiration pneumonia.  No infiltrate noted.    Amount and/or Complexity of Data Reviewed  Labs: ordered.  Radiology: ordered and independent interpretation performed.    Risk  Prescription drug management.             Medications   sodium chloride 0.9 % bolus 1,000 mL (0 mL Intravenous Stopped 10/21/24 1415)   pantoprazole (PROTONIX) injection 40 mg (40 mg Intravenous Given 10/21/24 1245)   ondansetron (ZOFRAN) injection 4 mg (4 mg Intravenous Given 10/21/24 1245)   ketorolac (TORADOL) injection 15 mg (15 mg Intravenous Given 10/21/24 1245)   iohexol (OMNIPAQUE) 350 MG/ML injection (MULTI-DOSE) 100 mL (100 mL Intravenous Given 10/21/24 1334)   ciprofloxacin (CIPRO) tablet 500 mg (500 mg Oral Given 10/21/24 1547)   metroNIDAZOLE (FLAGYL) tablet 500 mg (500 mg Oral Given 10/21/24 1547)       ED Risk Strat Scores                           SBIRT 22yo+      Flowsheet Row Most Recent Value   Initial  Alcohol Screen: US AUDIT-C     1. How often do you have a drink containing alcohol? 0 Filed at: 10/21/2024 1214   2. How many drinks containing alcohol do you have on a typical day you are drinking?  0 Filed at: 10/21/2024 1214   3a. Male UNDER 65: How often do you have five or more drinks on one occasion? 0 Filed at: 10/21/2024 1214   3b. FEMALE Any Age, or MALE 65+: How often do you have 4 or more drinks on one occassion? 0 Filed at: 10/21/2024 1214   Audit-C Score 0 Filed at: 10/21/2024 1214   YON: How many times in the past year have you...    Used an illegal drug or used a prescription medication for non-medical reasons? Never Filed at: 10/21/2024 1214                            History of Present Illness       Chief Complaint   Patient presents with    Abdominal Pain     Pt reports ongoing N/V and constipation for a week, states feeling weak and may have inhaled some of her vomit because she was holding it in her mouth       Past Medical History:   Diagnosis Date    Arthritis     Disease of thyroid gland     History of transfusion       Past Surgical History:   Procedure Laterality Date    APPENDECTOMY      HERNIA REPAIR      OOPHORECTOMY Right       History reviewed. No pertinent family history.   Social History     Tobacco Use    Smoking status: Former     Current packs/day: 0.00     Types: Cigarettes     Quit date:      Years since quittin.8    Smokeless tobacco: Never   Vaping Use    Vaping status: Never Used   Substance Use Topics    Alcohol use: Never     Alcohol/week: 0.0 standard drinks of alcohol    Drug use: Not Currently     Types: Marijuana      E-Cigarette/Vaping    E-Cigarette Use Never User       E-Cigarette/Vaping Substances    Nicotine No     THC No     CBD No     Flavoring No     Other No     Unknown No       I have reviewed and agree with the history as documented.     Patient is a 64-year-old female with a history of arthritis, hypothyroidism that presents with complaint of 1 week  of abdominal pain, associated with vomiting.  She denies diarrhea.  She denies sick contacts.  Patient is concerned about aspiration pneumonia, as she vomited into her mouth yesterday afternoon and swallowed it.  Patient initially denied choking on her emesis, but later admitted to it.      History provided by:  Patient   used: No    Abdominal Pain  Associated symptoms: nausea and vomiting    Associated symptoms: no chills, no cough, no diarrhea, no dysuria, no fever, no hematuria and no shortness of breath        Review of Systems   Constitutional:  Negative for chills and fever.   Respiratory:  Negative for cough, chest tightness and shortness of breath.    Gastrointestinal:  Positive for abdominal pain, nausea and vomiting. Negative for diarrhea.   Genitourinary:  Negative for dysuria, frequency, hematuria and urgency.   Musculoskeletal:  Negative for back pain, neck pain and neck stiffness.   All other systems reviewed and are negative.          Objective       ED Triage Vitals [10/21/24 1205]   Temperature Pulse Blood Pressure Respirations SpO2 Patient Position - Orthostatic VS   98.3 °F (36.8 °C) 74 125/74 16 97 % Sitting      Temp Source Heart Rate Source BP Location FiO2 (%) Pain Score    Oral Monitor Right arm -- 2      Vitals      Date and Time Temp Pulse SpO2 Resp BP Pain Score FACES Pain Rating User   10/21/24 1550 -- 74 98 % 18 128/76 No Pain -- [facility]   10/21/24 1300 -- -- -- -- -- 4 --    10/21/24 1245 -- -- -- -- -- 7 --    10/21/24 1215 -- 70 96 % -- 125/74 -- --    10/21/24 1205 98.3 °F (36.8 °C) 74 97 % 16 125/74 2 -- BRAYDON            Physical Exam  Vitals and nursing note reviewed.   Constitutional:       General: She is not in acute distress.     Appearance: She is well-developed. She is not diaphoretic.   HENT:      Head: Normocephalic and atraumatic.   Eyes:      Conjunctiva/sclera: Conjunctivae normal.      Pupils: Pupils are equal, round, and reactive to light.    Cardiovascular:      Rate and Rhythm: Normal rate and regular rhythm.      Heart sounds: Normal heart sounds. No murmur heard.  Pulmonary:      Effort: Pulmonary effort is normal. No respiratory distress.      Breath sounds: Normal breath sounds.   Abdominal:      General: Abdomen is flat. Bowel sounds are normal. There is no distension.      Palpations: Abdomen is soft.      Tenderness: There is generalized abdominal tenderness.   Musculoskeletal:         General: No deformity. Normal range of motion.      Cervical back: Normal range of motion and neck supple.   Skin:     General: Skin is warm and dry.      Capillary Refill: Capillary refill takes less than 2 seconds.      Coloration: Skin is not pale.      Findings: No rash.   Neurological:      General: No focal deficit present.      Mental Status: She is alert and oriented to person, place, and time.      Cranial Nerves: No cranial nerve deficit.   Psychiatric:         Mood and Affect: Mood is anxious.         Behavior: Behavior normal.         Results Reviewed       Procedure Component Value Units Date/Time    Urine culture [[id number]] Collected: 10/21/24 1233    Lab Status: Final result Specimen: Urine, Clean Catch Updated: 10/22/24 1253     Urine Culture 30,000-39,000 cfu/ml    HS Troponin I 2hr [[id number]]  (Normal) Collected: 10/21/24 1418    Lab Status: Final result Specimen: Blood from Arm, Left Updated: 10/21/24 1451     hs TnI 2hr 3 ng/L      Delta 2hr hsTnI 0 ng/L     HS Troponin 0hr (reflex protocol) [[id number]]  (Normal) Collected: 10/21/24 1233    Lab Status: Final result Specimen: Blood from Arm, Right Updated: 10/21/24 1306     hs TnI 0hr 3 ng/L     Comprehensive metabolic panel [[id number]] Collected: 10/21/24 1233    Lab Status: Final result Specimen: Blood from Arm, Right Updated: 10/21/24 1259     Sodium 137 mmol/L      Potassium 3.8 mmol/L      Chloride 103 mmol/L      CO2 29 mmol/L      ANION GAP 5 mmol/L      BUN 14 mg/dL      Creatinine  0.94 mg/dL      Glucose 102 mg/dL      Calcium 8.6 mg/dL      AST 18 U/L      ALT 14 U/L      Alkaline Phosphatase 70 U/L      Total Protein 6.5 g/dL      Albumin 4.1 g/dL      Total Bilirubin 0.74 mg/dL      eGFR 63 ml/min/1.73sq m     Narrative:      [employer] guidelines for Chronic Kidney Disease (CKD):     Stage 1 with normal or high GFR (GFR > 90 mL/min/1.73 square meters)    Stage 2 Mild CKD (GFR = 60-89 mL/min/1.73 square meters)    Stage 3A Moderate CKD (GFR = 45-59 mL/min/1.73 square meters)    Stage 3B Moderate CKD (GFR = 30-44 mL/min/1.73 square meters)    Stage 4 Severe CKD (GFR = 15-29 mL/min/1.73 square meters)    Stage 5 End Stage CKD (GFR <15 mL/min/1.73 square meters)  Note: GFR calculation is accurate only with a steady state creatinine    Magnesium [[id number]]  (Normal) Collected: 10/21/24 1233    Lab Status: Final result Specimen: Blood from Arm, Right Updated: 10/21/24 1259     Magnesium 2.0 mg/dL     Lipase [[id number]]  (Normal) Collected: 10/21/24 1233    Lab Status: Final result Specimen: Blood from Arm, Right Updated: 10/21/24 1259     Lipase 22 u/L     Urine Microscopic [[id number]]  (Abnormal) Collected: 10/21/24 1233    Lab Status: Final result Specimen: Urine, Clean Catch Updated: 10/21/24 1254     RBC, UA 2-4 /hpf      WBC, UA 10-20 /hpf      Epithelial Cells Occasional /hpf      Bacteria, UA Innumerable /hpf      MUCUS THREADS Occasional    UA w Reflex to Microscopic w Reflex to Culture [[id number]]  (Abnormal) Collected: 10/21/24 1233    Lab Status: Final result Specimen: Urine, Clean Catch Updated: 10/21/24 1245     Color, UA Yellow     Clarity, UA Slightly Cloudy     Specific Gravity, UA >=1.030     pH, UA 6.0     Leukocytes, UA Large     Nitrite, UA Negative     Protein, UA Trace mg/dl      Glucose, UA Negative mg/dl      Ketones, UA 10 (1+) mg/dl      Urobilinogen, UA <2.0 mg/dl      Bilirubin, UA Negative     Occult Blood, UA Trace    CBC and differential  [[id number]] Collected: 10/21/24 1233    Lab Status: Final result Specimen: Blood from Arm, Right Updated: 10/21/24 1242     WBC 4.75 Thousand/uL      RBC 4.87 Million/uL      Hemoglobin 14.0 g/dL      Hematocrit 43.5 %      MCV 89 fL      MCH 28.7 pg      MCHC 32.2 g/dL      RDW 12.9 %      MPV 9.4 fL      Platelets 209 Thousands/uL      nRBC 0 /100 WBCs      Segmented % 68 %      Immature Grans % 0 %      Lymphocytes % 24 %      Monocytes % 7 %      Eosinophils Relative 1 %      Basophils Relative 0 %      Absolute Neutrophils 3.21 Thousands/µL      Absolute Immature Grans 0.02 Thousand/uL      Absolute Lymphocytes 1.12 Thousands/µL      Absolute Monocytes 0.34 Thousand/µL      Eosinophils Absolute 0.04 Thousand/µL      Basophils Absolute 0.02 Thousands/µL             CT abdomen pelvis with contrast   Final Interpretation by Kat Salazar MD (10/21 4616)      Findings suggestive of acute colitis involving the transverse, descending, and sigmoid colon.      Uncomplicated colonic diverticulosis.      The study was marked in EPIC for immediate notification.         Workstation performed: [id number]         XR chest 1 view portable   ED Interpretation by Renay Dick DO (10/21 1430)   nad      Final Interpretation by Floresita Segovia MD (10/21 1454)   No acute cardiopulmonary disease.            Workstation performed: [id number]             ECG 12 Lead Documentation Only    Date/Time: 10/21/2024 12:40 PM    Performed by: Renay Dick DO  Authorized by: Renay Dick DO    Indications / Diagnosis:  Abd pain  ECG reviewed by me, the ED Provider: yes    Patient location:  ED  Previous ECG:     Previous ECG:  Unavailable    Comparison to cardiac monitor: Yes    Interpretation:     Interpretation: non-specific    Rate:     ECG rate:  69bpm    ECG rate assessment: normal    Rhythm:     Rhythm: sinus rhythm    Ectopy:     Ectopy: none    QRS:     QRS axis:  Normal  Conduction:     Conduction: normal     ST segments:     ST segments:  Normal  T waves:     T waves: normal        ED Medication and Procedure Management   Prior to Admission Medications   Prescriptions Last Dose Informant Patient Reported? Taking?   HYDROcodone-acetaminophen (NORCO) 7.5-325 mg per tablet  Self Yes No   Sig: Take 1 tablet by mouth every 8 (eight) hours as needed for pain   acetaminophen (TYLENOL) 325 mg tablet   No No   Sig: Take 2 tablets (650 mg total) by mouth every 6 (six) hours as needed for mild pain, headaches or fever   Patient not taking: Reported on 7/2/2022   cyclobenzaprine (FLEXERIL) 10 mg tablet   Yes No   Sig: Take 10 mg by mouth 3 (three) times a day as needed for muscle spasms   Patient not taking: Reported on 7/2/2022   dextromethorphan-guaiFENesin (ROBITUSSIN DM)  mg/5 mL syrup   No No   Sig: Take 10 mL by mouth 3 (three) times a day as needed for cough   Patient not taking: Reported on 7/2/2022   diazepam (VALIUM) 2 mg tablet   Yes No   Sig: Take 2 mg by mouth every 6 (six) hours as needed for anxiety   Patient not taking: Reported on 7/2/2022   dicyclomine (BENTYL) 20 mg tablet   No No   Sig: Take 1 tablet (20 mg total) by mouth 2 (two) times a day   Patient not taking: Reported on 4/23/2023   famotidine (PEPCID) 20 mg tablet   No No   Sig: Take 1 tablet (20 mg total) by mouth daily   Patient not taking: Reported on 4/23/2023   ibuprofen (MOTRIN) 100 mg/5 mL suspension   Yes No   Sig: Take by mouth if needed for mild pain   methocarbamol (ROBAXIN) 750 mg tablet   Yes No   Sig: Take 750 mg by mouth every 6 (six) hours as needed   naloxone (NARCAN) 4 mg/0.1 mL nasal spray   Yes No   Sig: ADMINISTER A SINGLE SPRAY INTRANASALLY INTO ONE NOSTRIL. CALL 911. MAY REPEAT X1.   ondansetron (ZOFRAN) 4 mg tablet   No No   Sig: Take 1 tablet (4 mg total) by mouth every 6 (six) hours   Patient not taking: Reported on 4/23/2023   ondansetron (ZOFRAN-ODT) 8 mg disintegrating tablet   Yes No   Sig: DISSOLVE 1 TABLET IN MOUTH  EVERY 12 HOURS AND PLACE ON THE TOP OF THE TONGUE WHERE IT WILL DISSOLVE, THEN SWALLOW   oxyCODONE-acetaminophen (PERCOCET) 5-325 mg per tablet   Yes No   Sig: Take 1 tablet by mouth every 4 (four) hours as needed for moderate pain   Patient not taking: Reported on 4/23/2023      Facility-Administered Medications: None     Discharge Medication List as of 10/21/2024  3:20 PM        START taking these medications    Details   ciprofloxacin (CIPRO) 500 mg tablet Take 1 tablet (500 mg total) by mouth 2 (two) times a day for 10 days, Starting Mon 10/21/2024, Until Thu 10/31/2024, Normal      metroNIDAZOLE (FLAGYL) 500 mg tablet Take 1 tablet (500 mg total) by mouth every 8 (eight) hours for 10 days, Starting Mon 10/21/2024, Until Thu 10/31/2024, Normal           CONTINUE these medications which have NOT CHANGED    Details   acetaminophen (TYLENOL) 325 mg tablet Take 2 tablets (650 mg total) by mouth every 6 (six) hours as needed for mild pain, headaches or fever, Starting Thu 10/24/2019, No Print      cyclobenzaprine (FLEXERIL) 10 mg tablet Take 10 mg by mouth 3 (three) times a day as needed for muscle spasms, Historical Med      dextromethorphan-guaiFENesin (ROBITUSSIN DM)  mg/5 mL syrup Take 10 mL by mouth 3 (three) times a day as needed for cough, Starting Thu 10/24/2019, Normal      diazepam (VALIUM) 2 mg tablet Take 2 mg by mouth every 6 (six) hours as needed for anxiety, Historical Med      dicyclomine (BENTYL) 20 mg tablet Take 1 tablet (20 mg total) by mouth 2 (two) times a day, Starting Mon 1/23/2023, Normal      famotidine (PEPCID) 20 mg tablet Take 1 tablet (20 mg total) by mouth daily, Starting Mon 1/23/2023, Normal      HYDROcodone-acetaminophen (NORCO) 7.5-325 mg per tablet Take 1 tablet by mouth every 8 (eight) hours as needed for pain, Historical Med      ibuprofen (MOTRIN) 100 mg/5 mL suspension Take by mouth if needed for mild pain, Historical Med      methocarbamol (ROBAXIN) 750 mg tablet Take  750 mg by mouth every 6 (six) hours as needed, Starting Wed 11/29/2023, Historical Med      naloxone (NARCAN) 4 mg/0.1 mL nasal spray ADMINISTER A SINGLE SPRAY INTRANASALLY INTO ONE NOSTRIL. CALL 911. MAY REPEAT X1., Historical Med      ondansetron (ZOFRAN) 4 mg tablet Take 1 tablet (4 mg total) by mouth every 6 (six) hours, Starting Mon 1/23/2023, Normal      ondansetron (ZOFRAN-ODT) 8 mg disintegrating tablet DISSOLVE 1 TABLET IN MOUTH EVERY 12 HOURS AND PLACE ON THE TOP OF THE TONGUE WHERE IT WILL DISSOLVE, THEN SWALLOW, Historical Med      oxyCODONE-acetaminophen (PERCOCET) 5-325 mg per tablet Take 1 tablet by mouth every 4 (four) hours as needed for moderate pain, Historical Med           No discharge procedures on file.  ED SEPSIS DOCUMENTATION   Time reflects when diagnosis was documented in both MDM as applicable and the Disposition within this note       Time User Action Codes Description Comment    10/21/2024  3:18 PM Joaquin Dickubussilvia O Add [K52.9] Colitis     10/21/2024  3:19 PM TIFFANYyesaJoaquin charltonubussilvia O Add [K57.90] Diverticulosis                  Olubusola O Mayelin, DO  10/25/24 8500

## 2024-10-22 LAB
ATRIAL RATE: 69 BPM
BACTERIA UR CULT: NORMAL
P AXIS: 2 DEGREES
PR INTERVAL: 158 MS
QRS AXIS: -13 DEGREES
QRSD INTERVAL: 80 MS
QT INTERVAL: 432 MS
QTC INTERVAL: 462 MS
T WAVE AXIS: 14 DEGREES
VENTRICULAR RATE: 69 BPM

## 2024-10-22 PROCEDURE — 93010 ELECTROCARDIOGRAM REPORT: CPT | Performed by: INTERNAL MEDICINE
